# Patient Record
Sex: FEMALE | Race: WHITE | Employment: FULL TIME | URBAN - METROPOLITAN AREA
[De-identification: names, ages, dates, MRNs, and addresses within clinical notes are randomized per-mention and may not be internally consistent; named-entity substitution may affect disease eponyms.]

---

## 2017-01-14 ENCOUNTER — HOSPITAL ENCOUNTER (OUTPATIENT)
Dept: BONE DENSITY | Age: 52
Discharge: HOME | End: 2017-01-14
Attending: INTERNAL MEDICINE
Payer: COMMERCIAL

## 2017-01-14 DIAGNOSIS — M81.0 OSTEOPOROSIS: ICD-10-CM

## 2017-01-14 PROCEDURE — 77080 DXA BONE DENSITY AXIAL: CPT

## 2017-01-18 ENCOUNTER — TELEPHONE (OUTPATIENT)
Dept: ENDOCRINOLOGY CLINIC | Facility: CLINIC | Age: 52
End: 2017-01-18

## 2017-01-18 NOTE — TELEPHONE ENCOUNTER
Returned call to [place]. I did check OV from 12/9 and it appears we billed for Prolia and then automatically code is pulled for therapeutic/prophylactic injection. There is no difference from previous billing that I can tell.  Provided her phone number to

## 2017-01-18 NOTE — TELEPHONE ENCOUNTER
Pt was reviewing bill and noticed Akbar has been listed as \"chemotherapy\" - Pt requesting a different code to be used as she does not want chemotherapy listed on her medical records. Pls call. Thank you.

## 2017-05-12 ENCOUNTER — TELEPHONE (OUTPATIENT)
Dept: ENDOCRINOLOGY CLINIC | Facility: CLINIC | Age: 52
End: 2017-05-12

## 2017-05-12 ENCOUNTER — PATIENT MESSAGE (OUTPATIENT)
Dept: ENDOCRINOLOGY CLINIC | Facility: CLINIC | Age: 52
End: 2017-05-12

## 2017-05-12 DIAGNOSIS — M81.0 OSTEOPOROSIS, UNSPECIFIED OSTEOPOROSIS TYPE, UNSPECIFIED PATHOLOGICAL FRACTURE PRESENCE: Primary | ICD-10-CM

## 2017-05-12 DIAGNOSIS — R63.5 WEIGHT GAIN: ICD-10-CM

## 2017-05-12 NOTE — TELEPHONE ENCOUNTER
Dr. Jovan Javier see message below. Booking patient for follow up for Prolia and she is going to discuss possible weight loss medication at visit. Do you want any additional labwork besides prolia labs?

## 2017-05-12 NOTE — TELEPHONE ENCOUNTER
Patient responded to mychart. Will book appt. Informed her to complete labs. IV received and patient has coverage for injection with no PA required.

## 2017-05-12 NOTE — TELEPHONE ENCOUNTER
From: Pauline Hernandez RN  To: Christel Yanez  Sent: 5/12/2017 10:38 AM CDT  Subject: Artelia Render Morning Angelica Terry,   This message is a reminder that you are due for a follow up appointment and your next Prolia injection in June.  We can schedule y

## 2017-05-17 RX ORDER — HYDROCHLOROTHIAZIDE 25 MG/1
TABLET ORAL
Qty: 45 TABLET | Refills: 1 | Status: SHIPPED | OUTPATIENT
Start: 2017-05-17 | End: 2017-10-21

## 2017-05-18 ENCOUNTER — TELEPHONE (OUTPATIENT)
Dept: OBGYN CLINIC | Facility: CLINIC | Age: 52
End: 2017-05-18

## 2017-05-30 ENCOUNTER — APPOINTMENT (OUTPATIENT)
Dept: LAB | Facility: HOSPITAL | Age: 52
End: 2017-05-30
Attending: INTERNAL MEDICINE
Payer: COMMERCIAL

## 2017-05-30 DIAGNOSIS — R63.5 WEIGHT GAIN: ICD-10-CM

## 2017-05-30 DIAGNOSIS — M81.0 OSTEOPOROSIS, UNSPECIFIED OSTEOPOROSIS TYPE, UNSPECIFIED PATHOLOGICAL FRACTURE PRESENCE: ICD-10-CM

## 2017-05-30 PROCEDURE — 36415 COLL VENOUS BLD VENIPUNCTURE: CPT

## 2017-05-30 PROCEDURE — 80048 BASIC METABOLIC PNL TOTAL CA: CPT

## 2017-05-30 PROCEDURE — 84439 ASSAY OF FREE THYROXINE: CPT

## 2017-05-30 PROCEDURE — 84443 ASSAY THYROID STIM HORMONE: CPT

## 2017-05-30 PROCEDURE — 83970 ASSAY OF PARATHORMONE: CPT

## 2017-05-30 PROCEDURE — 84080 ASSAY ALKALINE PHOSPHATASES: CPT

## 2017-05-30 PROCEDURE — 82306 VITAMIN D 25 HYDROXY: CPT

## 2017-06-09 ENCOUNTER — OFFICE VISIT (OUTPATIENT)
Dept: ENDOCRINOLOGY CLINIC | Facility: CLINIC | Age: 52
End: 2017-06-09

## 2017-06-09 VITALS
HEIGHT: 63 IN | BODY MASS INDEX: 32.18 KG/M2 | HEART RATE: 102 BPM | DIASTOLIC BLOOD PRESSURE: 88 MMHG | SYSTOLIC BLOOD PRESSURE: 138 MMHG | WEIGHT: 181.63 LBS

## 2017-06-09 DIAGNOSIS — M81.0 OSTEOPOROSIS WITHOUT CURRENT PATHOLOGICAL FRACTURE, UNSPECIFIED OSTEOPOROSIS TYPE: Primary | ICD-10-CM

## 2017-06-09 PROCEDURE — 96372 THER/PROPH/DIAG INJ SC/IM: CPT | Performed by: INTERNAL MEDICINE

## 2017-06-09 PROCEDURE — 99214 OFFICE O/P EST MOD 30 MIN: CPT | Performed by: INTERNAL MEDICINE

## 2017-06-09 PROCEDURE — 99213 OFFICE O/P EST LOW 20 MIN: CPT | Performed by: INTERNAL MEDICINE

## 2017-06-09 RX ORDER — PHENTERMINE HYDROCHLORIDE 15 MG/1
15 CAPSULE ORAL EVERY MORNING
Qty: 30 CAPSULE | Refills: 2 | Status: SHIPPED | OUTPATIENT
Start: 2017-06-09 | End: 2017-10-20

## 2017-06-09 RX ORDER — ERGOCALCIFEROL (VITAMIN D2) 1250 MCG
50000 CAPSULE ORAL WEEKLY
Qty: 12 CAPSULE | Refills: 1 | Status: SHIPPED | OUTPATIENT
Start: 2017-06-09 | End: 2017-07-09

## 2017-06-09 RX ORDER — TOPIRAMATE 50 MG/1
50 TABLET, FILM COATED ORAL DAILY
Qty: 90 TABLET | Refills: 1 | Status: SHIPPED | OUTPATIENT
Start: 2017-06-09 | End: 2017-10-20

## 2017-06-09 NOTE — PROGRESS NOTES
Name: Kori Chester  Date: 6/9/2017    Referring Physician: No ref.  provider found    Patient presents with:  Osteoporosis      HISTORY OF PRESENT ILLNESS   Kori Chester is a 49year old female who presents for Patient presents with:  Yvette Diana prescriptions:   •  HYDROCHLOROTHIAZIDE 25 MG Oral Tab, Take one-half tablet by  mouth daily, Disp: 45 tablet, Rfl: 1  •  cholecalciferol (KP VITAMIN D) 1000 UNITS Oral Cap, Take 5,000 Units by mouth daily.   , Disp: , Rfl:      Allergies:   No Known Allerg appropriately suppressed  -Vitamin D level has decreased, start Ergocalciferol 50,000 units weekly  -Repeat Prolia injection today  -DEXA improved    2.  Obesity  -She has gained significant weight over the past year  -Start Phentermine 15mg PO daily, verba

## 2017-08-03 ENCOUNTER — PATIENT MESSAGE (OUTPATIENT)
Dept: INTERNAL MEDICINE CLINIC | Facility: CLINIC | Age: 52
End: 2017-08-03

## 2017-08-04 NOTE — TELEPHONE ENCOUNTER
From: Toni Braden  To: Stefan Garcia MD  Sent: 8/3/2017 9:03 AM CDT  Subject: Non-Urgent Medical Question    Shelly Rosa responded to my request to remove the colonoscopy reminder since I had one in 2012 (I wasn't able to reply directly to that [facility]

## 2017-09-01 ENCOUNTER — OFFICE VISIT (OUTPATIENT)
Dept: INTERNAL MEDICINE CLINIC | Facility: CLINIC | Age: 52
End: 2017-09-01

## 2017-09-01 VITALS
BODY MASS INDEX: 31.89 KG/M2 | HEART RATE: 80 BPM | SYSTOLIC BLOOD PRESSURE: 120 MMHG | HEIGHT: 63 IN | WEIGHT: 180 LBS | DIASTOLIC BLOOD PRESSURE: 84 MMHG

## 2017-09-01 DIAGNOSIS — K44.9 HIATAL HERNIA: ICD-10-CM

## 2017-09-01 DIAGNOSIS — M81.0 OSTEOPOROSIS WITHOUT CURRENT PATHOLOGICAL FRACTURE, UNSPECIFIED OSTEOPOROSIS TYPE: ICD-10-CM

## 2017-09-01 DIAGNOSIS — Z00.00 ROUTINE GENERAL MEDICAL EXAMINATION AT A HEALTH CARE FACILITY: Primary | ICD-10-CM

## 2017-09-01 DIAGNOSIS — Z12.31 VISIT FOR SCREENING MAMMOGRAM: ICD-10-CM

## 2017-09-01 DIAGNOSIS — I31.3 PERICARDIAL EFFUSION: ICD-10-CM

## 2017-09-01 PROBLEM — I31.39 PERICARDIAL EFFUSION: Status: ACTIVE | Noted: 2017-09-01

## 2017-09-01 PROBLEM — I31.39 PERICARDIAL EFFUSION (HCC): Status: ACTIVE | Noted: 2017-09-01

## 2017-09-01 PROCEDURE — 99396 PREV VISIT EST AGE 40-64: CPT | Performed by: INTERNAL MEDICINE

## 2017-09-01 RX ORDER — PANTOPRAZOLE SODIUM 40 MG/1
40 TABLET, DELAYED RELEASE ORAL
Qty: 30 TABLET | Refills: 2 | Status: SHIPPED | OUTPATIENT
Start: 2017-09-01 | End: 2017-10-20

## 2017-09-01 RX ORDER — ERGOCALCIFEROL 1.25 MG/1
CAPSULE ORAL
COMMUNITY
Start: 2017-06-09 | End: 2017-10-10

## 2017-09-01 NOTE — PROGRESS NOTES
HPI:    Patient ID: Ankita Lambert is a 49year old female.     PHYSICAL      Past Medical History:    Lumbar radiculopathy                                          Obesity, unspecified                                          Unspecified essential hyp • Other and unspecified hyperlipidemia    • Unspecified essential hypertension          Family History   Problem Relation Age of Onset   • Heart Attack Father 61     myocardial infarction   • Diabetes Father    • Hypertension Father    • Hypertension Mot well-nourished. HENT:   Right Ear: External ear normal.   Left Ear: External ear normal.   Nose: Nose normal.   Mouth/Throat: Oropharynx is clear and moist. No oropharyngeal exudate.    Eyes: Conjunctivae and EOM are normal. Pupils are equal, round, and r head and at the bed as discussed. Follow-up with gastroenterology–Dr. TOLENTINO–[phone] for an appointment. Osteoporosis     DEXA scan ordered per Dr. Whitney bolanos. She has been on Prolia.          Pericardial effusion     CT scan–calcium scoring luan

## 2017-09-01 NOTE — ASSESSMENT & PLAN NOTE
.Normal  exam.  Labs noted. Breast exam normal–no palpable abnormalities, discharge from the nipples or axillary lymphadenopathy. No cervical or inguinal lymphadenopathy. Hernial orifices are intact.   Skin check with multiple nevi–needs follow-up by paul

## 2017-09-01 NOTE — PATIENT INSTRUCTIONS
Problem List Items Addressed This Visit        Unprioritized    Hiatal hernia     Significantly large hiatal hernia with part of the stomach in the thorax. Patient has minimal symptoms at this time.   Advised to start on pantoprazole 40 mg 1 tablet once da

## 2017-09-01 NOTE — ASSESSMENT & PLAN NOTE
CT scan–calcium scoring showed incidental finding of pericardial effusion–mild, echocardiogram has been ordered.

## 2017-09-09 ENCOUNTER — HOSPITAL ENCOUNTER (OUTPATIENT)
Dept: CV DIAGNOSTICS | Facility: HOSPITAL | Age: 52
Discharge: HOME | End: 2017-09-09
Attending: INTERNAL MEDICINE
Payer: COMMERCIAL

## 2017-09-09 DIAGNOSIS — I31.3 PERICARDIAL EFFUSION: ICD-10-CM

## 2017-09-09 PROCEDURE — 93306 TTE W/DOPPLER COMPLETE: CPT | Performed by: INTERNAL MEDICINE

## 2017-09-30 ENCOUNTER — HOSPITAL ENCOUNTER (OUTPATIENT)
Dept: MAMMOGRAPHY | Facility: HOSPITAL | Age: 52
Discharge: HOME | End: 2017-09-30
Attending: INTERNAL MEDICINE
Payer: COMMERCIAL

## 2017-09-30 DIAGNOSIS — Z12.31 VISIT FOR SCREENING MAMMOGRAM: ICD-10-CM

## 2017-09-30 PROCEDURE — 77067 SCR MAMMO BI INCL CAD: CPT | Performed by: INTERNAL MEDICINE

## 2017-10-03 ENCOUNTER — PATIENT MESSAGE (OUTPATIENT)
Dept: INTERNAL MEDICINE CLINIC | Facility: CLINIC | Age: 52
End: 2017-10-03

## 2017-10-03 DIAGNOSIS — Z00.00 ROUTINE GENERAL MEDICAL EXAMINATION AT A HEALTH CARE FACILITY: Primary | ICD-10-CM

## 2017-10-04 NOTE — TELEPHONE ENCOUNTER
Removed all pharmacies other than [facility] and [facility], unable to cancel original labs.

## 2017-10-04 NOTE — TELEPHONE ENCOUNTER
From: Roby Simmonds  To: Gertrudis Hawk MD  Sent: 10/3/2017 11:06 AM CDT  Subject: Non-Urgent Medical Question    I have two requests:    1) I have an order from Dr. Marcus Ty for bloodwork through [address] [place] [address] (printed on 9/1 during my last visit).  I have met

## 2017-10-06 ENCOUNTER — HOSPITAL ENCOUNTER (OUTPATIENT)
Dept: ULTRASOUND IMAGING | Facility: HOSPITAL | Age: 52
Discharge: HOME | End: 2017-10-06
Attending: INTERNAL MEDICINE
Payer: COMMERCIAL

## 2017-10-06 ENCOUNTER — HOSPITAL ENCOUNTER (OUTPATIENT)
Dept: MAMMOGRAPHY | Facility: HOSPITAL | Age: 52
Discharge: HOME | End: 2017-10-06
Attending: INTERNAL MEDICINE
Payer: COMMERCIAL

## 2017-10-06 DIAGNOSIS — R92.8 ABNORMAL MAMMOGRAM: ICD-10-CM

## 2017-10-06 PROCEDURE — 77065 DX MAMMO INCL CAD UNI: CPT | Performed by: INTERNAL MEDICINE

## 2017-10-06 PROCEDURE — 76642 ULTRASOUND BREAST LIMITED: CPT | Performed by: INTERNAL MEDICINE

## 2017-10-07 ENCOUNTER — LAB ENCOUNTER (OUTPATIENT)
Dept: LAB | Facility: HOSPITAL | Age: 52
End: 2017-10-07
Attending: INTERNAL MEDICINE
Payer: COMMERCIAL

## 2017-10-07 DIAGNOSIS — Z00.00 ROUTINE GENERAL MEDICAL EXAMINATION AT A HEALTH CARE FACILITY: ICD-10-CM

## 2017-10-07 PROCEDURE — 81003 URINALYSIS AUTO W/O SCOPE: CPT

## 2017-10-07 PROCEDURE — 84443 ASSAY THYROID STIM HORMONE: CPT

## 2017-10-07 PROCEDURE — 80061 LIPID PANEL: CPT

## 2017-10-07 PROCEDURE — 82306 VITAMIN D 25 HYDROXY: CPT

## 2017-10-07 PROCEDURE — 80053 COMPREHEN METABOLIC PANEL: CPT

## 2017-10-07 PROCEDURE — 82607 VITAMIN B-12: CPT

## 2017-10-07 PROCEDURE — 85025 COMPLETE CBC W/AUTO DIFF WBC: CPT

## 2017-10-07 PROCEDURE — 36415 COLL VENOUS BLD VENIPUNCTURE: CPT

## 2017-10-13 ENCOUNTER — PATIENT MESSAGE (OUTPATIENT)
Dept: INTERNAL MEDICINE CLINIC | Facility: CLINIC | Age: 52
End: 2017-10-13

## 2017-10-13 NOTE — TELEPHONE ENCOUNTER
From: Landrum Merlin  To: Iliana Arellano MD  Sent: 10/13/2017 8:43 AM CDT  Subject: Non-Urgent Medical Question    Hi Dr Carolyne Kimble be scheduling a follow up appointment for my cholesterol soon.  Uriel Crisostomo been taking 50,000 units of vitamin D since Ksenia Lawrence

## 2017-10-19 NOTE — TELEPHONE ENCOUNTER
See mychart message response      Justin Monroe been taking the 50,000 units for four months prescribed by Dr. Sarwat Peralta and my level is still low. I was taking 5,000 per day before that. I will continue and discuss with both of you in my follow up appointments.

## 2017-10-20 ENCOUNTER — PATIENT MESSAGE (OUTPATIENT)
Dept: GASTROENTEROLOGY | Facility: CLINIC | Age: 52
End: 2017-10-20

## 2017-10-20 ENCOUNTER — OFFICE VISIT (OUTPATIENT)
Dept: GASTROENTEROLOGY | Facility: CLINIC | Age: 52
End: 2017-10-20

## 2017-10-20 ENCOUNTER — TELEPHONE (OUTPATIENT)
Dept: GASTROENTEROLOGY | Facility: CLINIC | Age: 52
End: 2017-10-20

## 2017-10-20 ENCOUNTER — OFFICE VISIT (OUTPATIENT)
Dept: OBGYN CLINIC | Facility: CLINIC | Age: 52
End: 2017-10-20

## 2017-10-20 VITALS
SYSTOLIC BLOOD PRESSURE: 125 MMHG | WEIGHT: 194 LBS | HEIGHT: 63 IN | BODY MASS INDEX: 34.37 KG/M2 | DIASTOLIC BLOOD PRESSURE: 89 MMHG | HEART RATE: 102 BPM

## 2017-10-20 VITALS
DIASTOLIC BLOOD PRESSURE: 96 MMHG | HEART RATE: 147 BPM | WEIGHT: 194.38 LBS | SYSTOLIC BLOOD PRESSURE: 138 MMHG | BODY MASS INDEX: 34.44 KG/M2 | HEIGHT: 63 IN

## 2017-10-20 DIAGNOSIS — Z01.419 ENCOUNTER FOR GYNECOLOGICAL EXAMINATION: ICD-10-CM

## 2017-10-20 DIAGNOSIS — Z12.4 SCREENING FOR MALIGNANT NEOPLASM OF CERVIX: Primary | ICD-10-CM

## 2017-10-20 DIAGNOSIS — K44.9 HIATAL HERNIA: ICD-10-CM

## 2017-10-20 DIAGNOSIS — R93.3 ABNORMAL CT SCAN, STOMACH: ICD-10-CM

## 2017-10-20 DIAGNOSIS — R10.13 EPIGASTRIC ABDOMINAL PAIN: Primary | ICD-10-CM

## 2017-10-20 PROCEDURE — 99212 OFFICE O/P EST SF 10 MIN: CPT | Performed by: INTERNAL MEDICINE

## 2017-10-20 PROCEDURE — 99214 OFFICE O/P EST MOD 30 MIN: CPT | Performed by: INTERNAL MEDICINE

## 2017-10-20 PROCEDURE — 99396 PREV VISIT EST AGE 40-64: CPT | Performed by: OBSTETRICS & GYNECOLOGY

## 2017-10-20 NOTE — PROGRESS NOTES
HPI:    Patient ID: Ziggy Briggs is a 49year old woman with history of elevated BMI, hypertension, dyslipidemia, cholecystectomy in 2010.   She returns today for follow-up of abdominal symptoms and question of progression, worsening of previous hiat surgery in March of 2010 here at [address] [place] [address], apparently for gallstones by the operative report and pathology. Intraoperative cholangiogram was performed and was negative.  She does not recall similar such pain at that time.     Previous history is also significant months of episodic right upper quadrant pain once or twice per week with the only noted triggers being ibuprofen and a single dose of hydrocodone.  Possibilities include functional, idiopathic which would be a bit atypical with the episodic nature these com by Dr. Gucci Aguilar for assessment of approximately 4 week history of diarrhea which has turned bloody.     She first suffered several days of diarrhea the first week of April, possibly around April 3rd.  This resolved spontaneously and she was well for at least restaurant for a meal which included oysters and a tuna tartare. Of 4 people sharing that meal, one other person was hospitalized with acute gastrointestinal syndrome. She vomited that night after having that meal and some alcohol.  No more recent exposures MAC anesthesia. We discussed the nature and risks of EGD examination including sedation, anesthesia risks; bleeding, esophagus/stomach/duodenal injury or perforation, infection. The patient understood these risks and agrees to proceed.   The need for an acc

## 2017-10-20 NOTE — TELEPHONE ENCOUNTER
See above message from pt. Did you want pt to continue pantorprazole?   If so, pended above to send to [facility]

## 2017-10-20 NOTE — TELEPHONE ENCOUNTER
From: Hallie Yanez  To: Kristian Segura MD  Sent: 10/20/2017 5:24 PM CDT  Subject: Prescription Question    I forgot to ask if I should continue taking the Pantoprazole that Dr Yamilka Sanchez prescribed.  If I do I will need it called in through [facility]

## 2017-10-20 NOTE — TELEPHONE ENCOUNTER
Scheduled for:  EGD    Provider Name:  NEGIN  Date:  11-30-17  Location:  [facility]  Sedation:  MAC  Time:  Pt aware CF will call with arrival time the day prior to procedure.    Prep: EGD prep  Meds/Allergies Reconciled?:  Physician reviewed   Diagnosis with code

## 2017-10-20 NOTE — PATIENT INSTRUCTIONS
1. MRI scan    2.  Schedule EGD (stomach endoscopy) exam at [facility])    MAC anesthesia    DX = abnormal stomach on CT scan, epigastic pain and vomiting

## 2017-10-22 RX ORDER — PANTOPRAZOLE SODIUM 40 MG/1
40 TABLET, DELAYED RELEASE ORAL
Qty: 90 TABLET | Refills: 3 | Status: SHIPPED | OUTPATIENT
Start: 2017-10-22 | End: 2018-04-07

## 2017-10-23 RX ORDER — HYDROCHLOROTHIAZIDE 25 MG/1
TABLET ORAL
Qty: 45 TABLET | Refills: 2 | Status: SHIPPED | OUTPATIENT
Start: 2017-10-23 | End: 2018-04-07

## 2017-10-23 NOTE — PROGRESS NOTES
Ankita Lambert is a 49year old female [id number] Patient's last menstrual period was 04/01/2014. here for annual exam.       Last seen 10/14/16 for colpo-- REDD 1. Had LEEP done in 2004 with me.   Had increased bleeding during LEEP     LMP 2014    No c ALLERGIES:  No Known Allergies      Review of Systems:  Constitutional:  Denies fatigue, night sweats, hot flashes  Eyes:  denies blurred or double vision  Cardiovascular:  denies chest pain or palpitations  Respiratory:  denies shortness of breath an annual physical exam.    1. Encounter for gynecological examination  Pap and HPV. Annual exams encouraged. RTC 1 year or prn    2.  REDD 1 in 2016        No prescriptions requested or ordered in this encounter      Truong Eid MD  10/22/2017  10:31 PM

## 2017-10-25 ENCOUNTER — TELEPHONE (OUTPATIENT)
Dept: GASTROENTEROLOGY | Facility: CLINIC | Age: 52
End: 2017-10-25

## 2017-10-25 NOTE — TELEPHONE ENCOUNTER
Forward to [facility] for the Tax ID number at [facility].

## 2017-10-25 NOTE — TELEPHONE ENCOUNTER
[facility] indicates she will cancel orders, only show cln not egd, requesting to resubmit, no need to call, thanks.

## 2017-10-25 NOTE — TELEPHONE ENCOUNTER
[facility] calling for pt regarding procedure 11/30, requesting how it will be billed ie in office, etc, pls call at:[phone],thanks.  (Due on 10/27, submitted incorrect)  Pls call asap

## 2017-10-25 NOTE — TELEPHONE ENCOUNTER
# left was direct # for [facility]    I called and spoke with her, reviewed pt is scheduled at an ambulatory surgery center, NOT hospital outpatient, so no precertification is required at this time.

## 2017-10-25 NOTE — TELEPHONE ENCOUNTER
From: Go Carpenter CMA  To: Mihai Yanez  Sent: 10/20/2017 4:17 PM CDT  Subject: EGD instrcutions     Juani Hernández,     As discussed at your office visit, you need to contact your insurance company prior to your procedure to check for pre-certificatio

## 2017-10-29 ENCOUNTER — PATIENT MESSAGE (OUTPATIENT)
Dept: GASTROENTEROLOGY | Facility: CLINIC | Age: 52
End: 2017-10-29

## 2017-10-30 NOTE — TELEPHONE ENCOUNTER
From: Feliberto Yanez  To: Norah Jon MD  Sent: 10/29/2017 11:02 AM CDT  Subject: Non-Urgent Medical Question    I was pre authorized for the MRI.  I received a letter in the mail from [facility] on behalf of [facility] asking for prior tests, lab

## 2017-11-06 ENCOUNTER — HOSPITAL ENCOUNTER (OUTPATIENT)
Dept: MRI IMAGING | Facility: HOSPITAL | Age: 52
Discharge: HOME | End: 2017-11-06
Attending: INTERNAL MEDICINE
Payer: COMMERCIAL

## 2017-11-06 DIAGNOSIS — R10.13 EPIGASTRIC ABDOMINAL PAIN: ICD-10-CM

## 2017-11-06 PROCEDURE — A9575 INJ GADOTERATE MEGLUMI 0.1ML: HCPCS | Performed by: INTERNAL MEDICINE

## 2017-11-06 PROCEDURE — 74183 MRI ABD W/O CNTR FLWD CNTR: CPT | Performed by: INTERNAL MEDICINE

## 2017-11-13 ENCOUNTER — TELEPHONE (OUTPATIENT)
Dept: ENDOCRINOLOGY CLINIC | Facility: CLINIC | Age: 52
End: 2017-11-13

## 2017-11-13 NOTE — TELEPHONE ENCOUNTER
Daryn Odonnell due for next Prolia injection in December. Currently has appt scheduled for 12/8. IV submitted to Prolia plus and will follow up on coverage. Labs completed within the last year per [facility] protocol.

## 2017-11-14 ENCOUNTER — PATIENT MESSAGE (OUTPATIENT)
Dept: GASTROENTEROLOGY | Facility: CLINIC | Age: 52
End: 2017-11-14

## 2017-11-15 ENCOUNTER — PATIENT MESSAGE (OUTPATIENT)
Dept: ENDOCRINOLOGY CLINIC | Facility: CLINIC | Age: 52
End: 2017-11-15

## 2017-11-15 DIAGNOSIS — M81.0 OSTEOPOROSIS, UNSPECIFIED OSTEOPOROSIS TYPE, UNSPECIFIED PATHOLOGICAL FRACTURE PRESENCE: Primary | ICD-10-CM

## 2017-11-15 NOTE — TELEPHONE ENCOUNTER
From: Darvin Yanez  To: Jasper Gomes MD  Sent: 11/14/2017 8:45 AM CST  Subject: Test Results Question    I have a couple of questions.      1) I read the results of my MRI but need to know what it really means and if there’s anything xiomara

## 2017-11-15 NOTE — TELEPHONE ENCOUNTER
From: Arleen Yanez  To: Rand Saunders MD  Sent: 11/15/2017 8:56 AM CST  Subject: Non-Urgent Medical Question    I have an appointment on 12/8 to get my Prolia injection. I’ll need my lab order.  Also, I had some blood work done in October ordered by

## 2017-11-15 NOTE — TELEPHONE ENCOUNTER
CMP, vitamin D completed in 10/2017. Orders placed for PTH and bone specific alkaline phosphatase per [facility] protocol.

## 2017-11-16 NOTE — TELEPHONE ENCOUNTER
IV received from Prolia plus. Patient has coverage for injection and no PA required.  Should owe 0% of cost.

## 2017-11-30 ENCOUNTER — TELEPHONE (OUTPATIENT)
Dept: GASTROENTEROLOGY | Facility: CLINIC | Age: 52
End: 2017-11-30

## 2017-11-30 ENCOUNTER — LAB REQUISITION (OUTPATIENT)
Dept: LAB | Facility: HOSPITAL | Age: 52
End: 2017-11-30
Payer: COMMERCIAL

## 2017-11-30 DIAGNOSIS — R10.13 EPIGASTRIC ABDOMINAL PAIN: ICD-10-CM

## 2017-11-30 DIAGNOSIS — K57.10 DUODENAL DIVERTICULUM: ICD-10-CM

## 2017-11-30 DIAGNOSIS — K44.9 HIATAL HERNIA: Primary | ICD-10-CM

## 2017-11-30 DIAGNOSIS — Z01.89 ENCOUNTER FOR OTHER SPECIFIED SPECIAL EXAMINATIONS: ICD-10-CM

## 2017-11-30 PROCEDURE — 88312 SPECIAL STAINS GROUP 1: CPT | Performed by: INTERNAL MEDICINE

## 2017-11-30 PROCEDURE — 88305 TISSUE EXAM BY PATHOLOGIST: CPT | Performed by: INTERNAL MEDICINE

## 2017-11-30 NOTE — TELEPHONE ENCOUNTER
EGD completed today. UGI XR ordered today to further evaluate HH. LFTs/lipase ordered for next episode severe pain. Colicky pain episodes include vomiting, sensation of being unable to breathe, unable to swallow.

## 2017-11-30 NOTE — TELEPHONE ENCOUNTER
Dr Simone Kumar - see below. I do not see this message was addressed. Please advise on MRI results from 11/6. Pt has sent multiple "[employer] messages.

## 2017-12-02 ENCOUNTER — APPOINTMENT (OUTPATIENT)
Dept: LAB | Facility: HOSPITAL | Age: 52
End: 2017-12-02
Attending: INTERNAL MEDICINE
Payer: COMMERCIAL

## 2017-12-02 DIAGNOSIS — M81.0 OSTEOPOROSIS, UNSPECIFIED OSTEOPOROSIS TYPE, UNSPECIFIED PATHOLOGICAL FRACTURE PRESENCE: ICD-10-CM

## 2017-12-02 PROCEDURE — 83970 ASSAY OF PARATHORMONE: CPT

## 2017-12-02 PROCEDURE — 36415 COLL VENOUS BLD VENIPUNCTURE: CPT

## 2017-12-02 PROCEDURE — 84080 ASSAY ALKALINE PHOSPHATASES: CPT

## 2017-12-08 ENCOUNTER — OFFICE VISIT (OUTPATIENT)
Dept: INTERNAL MEDICINE CLINIC | Facility: CLINIC | Age: 52
End: 2017-12-08

## 2017-12-08 ENCOUNTER — OFFICE VISIT (OUTPATIENT)
Dept: ENDOCRINOLOGY CLINIC | Facility: CLINIC | Age: 52
End: 2017-12-08

## 2017-12-08 VITALS
SYSTOLIC BLOOD PRESSURE: 136 MMHG | HEART RATE: 123 BPM | DIASTOLIC BLOOD PRESSURE: 95 MMHG | HEIGHT: 63 IN | BODY MASS INDEX: 36.54 KG/M2 | WEIGHT: 206.19 LBS

## 2017-12-08 VITALS
DIASTOLIC BLOOD PRESSURE: 84 MMHG | HEART RATE: 105 BPM | RESPIRATION RATE: 16 BRPM | BODY MASS INDEX: 36.5 KG/M2 | WEIGHT: 206 LBS | SYSTOLIC BLOOD PRESSURE: 136 MMHG | HEIGHT: 63 IN

## 2017-12-08 DIAGNOSIS — M81.0 OSTEOPOROSIS WITHOUT CURRENT PATHOLOGICAL FRACTURE, UNSPECIFIED OSTEOPOROSIS TYPE: ICD-10-CM

## 2017-12-08 DIAGNOSIS — K44.9 HIATAL HERNIA: Primary | ICD-10-CM

## 2017-12-08 DIAGNOSIS — E78.49 OTHER HYPERLIPIDEMIA: ICD-10-CM

## 2017-12-08 DIAGNOSIS — I10 ESSENTIAL HYPERTENSION: ICD-10-CM

## 2017-12-08 DIAGNOSIS — R92.8 ABNORMALITY OF RIGHT BREAST ON SCREENING MAMMOGRAM: ICD-10-CM

## 2017-12-08 DIAGNOSIS — K76.89 FOCAL NODULAR HYPERPLASIA OF LIVER: ICD-10-CM

## 2017-12-08 DIAGNOSIS — M81.0 OSTEOPOROSIS WITHOUT CURRENT PATHOLOGICAL FRACTURE, UNSPECIFIED OSTEOPOROSIS TYPE: Primary | ICD-10-CM

## 2017-12-08 PROBLEM — I31.3 PERICARDIAL EFFUSION: Status: RESOLVED | Noted: 2017-09-01 | Resolved: 2017-12-08

## 2017-12-08 PROBLEM — I31.39 PERICARDIAL EFFUSION: Status: RESOLVED | Noted: 2017-09-01 | Resolved: 2017-12-08

## 2017-12-08 PROBLEM — I31.39 PERICARDIAL EFFUSION (HCC): Status: RESOLVED | Noted: 2017-09-01 | Resolved: 2017-12-08

## 2017-12-08 PROCEDURE — 99212 OFFICE O/P EST SF 10 MIN: CPT | Performed by: INTERNAL MEDICINE

## 2017-12-08 PROCEDURE — 96372 THER/PROPH/DIAG INJ SC/IM: CPT | Performed by: INTERNAL MEDICINE

## 2017-12-08 PROCEDURE — 99213 OFFICE O/P EST LOW 20 MIN: CPT | Performed by: INTERNAL MEDICINE

## 2017-12-08 PROCEDURE — 99214 OFFICE O/P EST MOD 30 MIN: CPT | Performed by: INTERNAL MEDICINE

## 2017-12-08 RX ORDER — ERGOCALCIFEROL 1.25 MG/1
CAPSULE ORAL
Qty: 24 CAPSULE | Refills: 1 | Status: SHIPPED | OUTPATIENT
Start: 2017-12-08 | End: 2018-04-07

## 2017-12-08 RX ORDER — ROSUVASTATIN CALCIUM 5 MG/1
5 TABLET, COATED ORAL NIGHTLY
Qty: 90 TABLET | Refills: 1 | Status: SHIPPED | OUTPATIENT
Start: 2017-12-08 | End: 2018-04-12

## 2017-12-08 NOTE — ASSESSMENT & PLAN NOTE
Extremely large hiatal hernia with the part of the stomach in the thoracic cavity. Given persistent palpitations advised a surgical consult. She will be continued on pantoprazole at this time EGD without any significant ulcerations.   There is high risk f

## 2017-12-08 NOTE — ASSESSMENT & PLAN NOTE
Would recommend a follow-up MRI in the next 6-8 months. Asymptomatic at this time, will follow up on liver function tests.

## 2017-12-08 NOTE — PROGRESS NOTES
Name: Lena Hollins  Date: 12/8/2017    Referring Physician: No ref.  provider found    Patient presents with:  Osteoporosis      HISTORY OF PRESENT ILLNESS   Lena Hollins is a 49year old female who presents for Patient presents with:  Fermin Ragsdale Pantoprazole Sodium 40 MG Oral Tab EC, Take 1 tablet (40 mg total) by mouth every morning before breakfast., Disp: 90 tablet, Rfl: 3  •  ergocalciferol 78550 units Oral Cap, 1 capsule p.o. q. weekly, Disp: 12 capsule, Rfl: 0  •  SUMAtriptan Succinate (IMIT grossly intact and motor grossly intact  Psychiatric:  oriented to time, self, and place  Nutritional:  no abnormal weight gain or loss    ASSESSMENT/PLAN:    1.  Osteoporosis  -Diagnosed with significant osteoporosis  -Started on prolia and tolerating well

## 2017-12-08 NOTE — ASSESSMENT & PLAN NOTE
Blood pressure 136/84, pulse 105, resp. rate 16, height 5' 3\" (1.6 m), weight 206 lb (93.4 kg), last menstrual period 04/01/2014, not currently breastfeeding.     blood pressure was quite elevated upon arrival but seemed to improve but heart rate remained

## 2017-12-08 NOTE — PATIENT INSTRUCTIONS
Problem List Items Addressed This Visit        Unprioritized    Abnormality of right breast on screening mammogram     Follow-up diagnostic mammogram right with ultrasound has been ordered         Relevant Orders    MYESHA DIAGNOSTIC RIGHT (CPT=77065)    Esse LIPID PANEL

## 2017-12-08 NOTE — PROGRESS NOTES
HPI:    Patient ID: Ashlee Donohue is a 47year old female. Mri abdomen per dr Ekta Sen:   1. There is indeterminate arterially enhancing lesion at the border of segments V and VI. This is not seen on the remaining sequences.  The foremo GERD.       Review of Systems   Constitutional: Negative. HENT: Negative. Eyes: Negative. Respiratory: Negative. Cardiovascular: Positive for palpitations. Gastrointestinal: Positive for abdominal pain. Endocrine: Negative.     Genitourinary no tenderness. Abdominal: Soft. Bowel sounds are normal. She exhibits no distension and no mass. There is no tenderness. There is no rebound. Musculoskeletal: Normal range of motion. She exhibits no tenderness.    Lymphadenopathy:     She has no cervica WITH DIFFERENTIAL WITH PLATELET    Osteoporosis    Other hyperlipidemia     Lipid panel and liver function tests have been discussed. Patient willing to start on Crestor at this time at 5 mg daily. Prescription has been sent into the pharmacy.   Recheck l

## 2017-12-08 NOTE — ASSESSMENT & PLAN NOTE
Lipid panel and liver function tests have been discussed. Patient willing to start on Crestor at this time at 5 mg daily. Prescription has been sent into the pharmacy. Recheck labs in about 3 months.   Call after insurance changes for appropriate lab ord

## 2017-12-11 ENCOUNTER — PATIENT MESSAGE (OUTPATIENT)
Dept: INTERNAL MEDICINE CLINIC | Facility: CLINIC | Age: 52
End: 2017-12-11

## 2017-12-11 RX ORDER — SUMATRIPTAN 50 MG/1
TABLET, FILM COATED ORAL
Qty: 9 TABLET | Refills: 3 | Status: SHIPPED | OUTPATIENT
Start: 2017-12-11 | End: 2018-11-30

## 2017-12-11 NOTE — TELEPHONE ENCOUNTER
Please advise on my chart message     From: Cristofer Demarco  To: Ashley Ghotra MD  Sent: 12/11/2017  3:18 PM CST  Subject: Non-Urgent Medical Question    Hi Dr Brunilda Greenwood,    I need three things:    1) Recommended surgeon for hiatal hernia consult.  You me

## 2017-12-13 ENCOUNTER — TELEPHONE (OUTPATIENT)
Dept: GASTROENTEROLOGY | Facility: CLINIC | Age: 52
End: 2017-12-13

## 2017-12-13 NOTE — TELEPHONE ENCOUNTER
I mailed out EGD results letter to pt  GI RNs - 1.  Please print and mail this letter to patient     em

## 2017-12-22 ENCOUNTER — HOSPITAL ENCOUNTER (EMERGENCY)
Facility: HOSPITAL | Age: 52
Discharge: HOME | End: 2017-12-22
Attending: EMERGENCY MEDICINE
Payer: COMMERCIAL

## 2017-12-22 VITALS
WEIGHT: 205 LBS | BODY MASS INDEX: 36.32 KG/M2 | HEIGHT: 63 IN | TEMPERATURE: 100 F | SYSTOLIC BLOOD PRESSURE: 124 MMHG | OXYGEN SATURATION: 99 % | DIASTOLIC BLOOD PRESSURE: 89 MMHG | RESPIRATION RATE: 18 BRPM | HEART RATE: 104 BPM

## 2017-12-22 DIAGNOSIS — R10.9 ABDOMINAL PAIN, ACUTE: Primary | ICD-10-CM

## 2017-12-22 DIAGNOSIS — R19.7 DIARRHEA, UNSPECIFIED TYPE: ICD-10-CM

## 2017-12-22 PROCEDURE — 99284 EMERGENCY DEPT VISIT MOD MDM: CPT

## 2017-12-22 PROCEDURE — 80053 COMPREHEN METABOLIC PANEL: CPT | Performed by: EMERGENCY MEDICINE

## 2017-12-22 PROCEDURE — 81003 URINALYSIS AUTO W/O SCOPE: CPT | Performed by: EMERGENCY MEDICINE

## 2017-12-22 PROCEDURE — 85027 COMPLETE CBC AUTOMATED: CPT | Performed by: EMERGENCY MEDICINE

## 2017-12-22 PROCEDURE — 85025 COMPLETE CBC W/AUTO DIFF WBC: CPT | Performed by: EMERGENCY MEDICINE

## 2017-12-22 PROCEDURE — 85007 BL SMEAR W/DIFF WBC COUNT: CPT | Performed by: EMERGENCY MEDICINE

## 2017-12-22 PROCEDURE — 96360 HYDRATION IV INFUSION INIT: CPT

## 2017-12-22 PROCEDURE — 83690 ASSAY OF LIPASE: CPT | Performed by: EMERGENCY MEDICINE

## 2017-12-22 RX ORDER — DICYCLOMINE HYDROCHLORIDE 10 MG/5ML
20 SOLUTION ORAL ONCE
Status: COMPLETED | OUTPATIENT
Start: 2017-12-22 | End: 2017-12-22

## 2017-12-22 RX ORDER — DICYCLOMINE HYDROCHLORIDE 10 MG/ML
20 INJECTION INTRAMUSCULAR ONCE
Status: DISCONTINUED | OUTPATIENT
Start: 2017-12-22 | End: 2017-12-22

## 2017-12-22 RX ORDER — MAGNESIUM HYDROXIDE/ALUMINUM HYDROXICE/SIMETHICONE 120; 1200; 1200 MG/30ML; MG/30ML; MG/30ML
30 SUSPENSION ORAL ONCE
Status: COMPLETED | OUTPATIENT
Start: 2017-12-22 | End: 2017-12-22

## 2017-12-22 NOTE — ED NOTES
Patient presents to ed with days of diarrhea and crampy abdominal pain. Patient has been traveling a lot. Alert oriented, well appearing. Denies vomiting or fever.

## 2017-12-22 NOTE — ED PROVIDER NOTES
Patient Seen in: [facility] AND [facility] Emergency Department    History   Patient presents with:  Abdomen/Flank Pain (GI/)    Stated Complaint: Abdominal Pain    HPI  48 yo female with abdominal pain. She c/o right sided abdominal pain.  She has had diarrhea Cardiovascular: Normal rate, regular rhythm, normal heart sounds and intact distal pulses. Pulmonary/Chest: Effort normal and breath sounds normal.   Abdominal: Soft. Bowel sounds are normal. She exhibits no distension and no mass.  There is tenderness RAINBOW DRAW LAVENDER TALL (BNP)       ED Course as of Dec 22 0623  ------------------------------------------------------------       MDM   Labs and imaging reviewed and are unremarkable. Patient reexamined at  and is feeling better. Discharge home.

## 2017-12-27 ENCOUNTER — HOSPITAL ENCOUNTER (OUTPATIENT)
Dept: GENERAL RADIOLOGY | Facility: HOSPITAL | Age: 52
Discharge: HOME | End: 2017-12-27
Attending: INTERNAL MEDICINE
Payer: COMMERCIAL

## 2017-12-27 DIAGNOSIS — R10.13 EPIGASTRIC ABDOMINAL PAIN: ICD-10-CM

## 2017-12-27 DIAGNOSIS — K44.9 HIATAL HERNIA: ICD-10-CM

## 2017-12-27 PROCEDURE — 74246 X-RAY XM UPR GI TRC 2CNTRST: CPT | Performed by: INTERNAL MEDICINE

## 2018-01-05 ENCOUNTER — TELEPHONE (OUTPATIENT)
Dept: GASTROENTEROLOGY | Facility: CLINIC | Age: 53
End: 2018-01-05

## 2018-01-05 NOTE — TELEPHONE ENCOUNTER
Pt calling for xray from 12/27 - pls call or post on [employer], pt requesting to know exactly what it means, ph:[phone],thanks.  *ok leave detailed message on vm

## 2018-01-05 NOTE — TELEPHONE ENCOUNTER
Dr YATES [facility],    Pt requesting XR UGI w/Esophagram results.     Please advise

## 2018-01-08 NOTE — TELEPHONE ENCOUNTER
My Chart message sent to Ms Sydnee Servin today: \"Dear Ms. Yanez,    Thank you for coming in for the upper GI x-ray on 12/27/2017. I apologize for the delay in returning these results to you. That upper GI x-ray exam looked okay.   I ordered that ex

## 2018-01-09 NOTE — TELEPHONE ENCOUNTER
I spoke to Renato Andrews over the phone. Appt scheduled.     Future Appointments  Date Time Provider Thien Cox   4/27/2018 9:00 AM Noah Barney MD [facility]

## 2018-02-15 ENCOUNTER — HOSPITAL ENCOUNTER (OUTPATIENT)
Age: 53
Discharge: HOME | End: 2018-02-15
Attending: EMERGENCY MEDICINE
Payer: COMMERCIAL

## 2018-02-15 VITALS
HEIGHT: 63 IN | BODY MASS INDEX: 37.03 KG/M2 | TEMPERATURE: 98 F | DIASTOLIC BLOOD PRESSURE: 101 MMHG | WEIGHT: 209 LBS | SYSTOLIC BLOOD PRESSURE: 150 MMHG | OXYGEN SATURATION: 96 % | HEART RATE: 113 BPM | RESPIRATION RATE: 18 BRPM

## 2018-02-15 DIAGNOSIS — J11.1 INFLUENZA: Primary | ICD-10-CM

## 2018-02-15 PROCEDURE — 99214 OFFICE O/P EST MOD 30 MIN: CPT

## 2018-02-15 PROCEDURE — 99213 OFFICE O/P EST LOW 20 MIN: CPT

## 2018-02-15 RX ORDER — BENZONATATE 200 MG/1
200 CAPSULE ORAL 3 TIMES DAILY PRN
Qty: 15 CAPSULE | Refills: 0 | Status: SHIPPED | OUTPATIENT
Start: 2018-02-15 | End: 2018-02-20

## 2018-02-15 RX ORDER — OSELTAMIVIR PHOSPHATE 75 MG/1
75 CAPSULE ORAL 2 TIMES DAILY
Qty: 10 CAPSULE | Refills: 0 | Status: SHIPPED | OUTPATIENT
Start: 2018-02-15 | End: 2018-02-20

## 2018-02-15 NOTE — ED PROVIDER NOTES
Patient Seen in: [facility]    History   Patient presents with:  Cough/URI    Stated Complaint: Cough    HPI  Patient complains of body aches, generalized malaise and cough for a day and a half and last night started with person, place, and time. She appears well-developed and well-nourished. No distress. Well appearing   HENT:   Head: Normocephalic and atraumatic.    Right Ear: Tympanic membrane and external ear normal.   Left Ear: Tympanic membrane and external ear amol medications    Oseltamivir Phosphate (TAMIFLU) 75 MG Oral Cap  Take 1 capsule (75 mg total) by mouth 2 (two) times daily. , Print, Disp-10 capsule, R-0

## 2018-03-22 ENCOUNTER — PATIENT MESSAGE (OUTPATIENT)
Dept: ENDOCRINOLOGY CLINIC | Facility: CLINIC | Age: 53
End: 2018-03-22

## 2018-03-22 NOTE — TELEPHONE ENCOUNTER
From: Feliberto Yanez  To: Sav Roach MD  Sent: 3/22/2018 11:35 AM CDT  Subject: Non-Urgent Medical Question    I received a letter that I'm overdue for a test. I'm not sure what test is due. My next Prolia injection is scheduled for June.      Also,

## 2018-03-22 NOTE — TELEPHONE ENCOUNTER
Mailed lab orders to patient to have at [facility] or [address] [place] [address] per insurance in May 2018 before next injection in June. Last Prolia injection 12/8/2017 - Written in prolia book for 6/8/2018.

## 2018-03-22 NOTE — TELEPHONE ENCOUNTER
From: Tasha Yanez  To: Bradley Schaumann, MD  Sent: 3/22/2018 12:27 PM CDT  Subject: Non-Urgent Medical Question    Sorry to bother you again. One additional request.     I need to update my pharmacy and am not seeing a way to do that within [address]  [place]

## 2018-04-07 RX ORDER — ROSUVASTATIN CALCIUM 5 MG/1
5 TABLET, COATED ORAL NIGHTLY
Qty: 90 TABLET | Refills: 1 | Status: CANCELLED
Start: 2018-04-07

## 2018-04-09 ENCOUNTER — PATIENT MESSAGE (OUTPATIENT)
Dept: ENDOCRINOLOGY CLINIC | Facility: CLINIC | Age: 53
End: 2018-04-09

## 2018-04-09 RX ORDER — ERGOCALCIFEROL 1.25 MG/1
CAPSULE ORAL
Qty: 24 CAPSULE | Refills: 0 | Status: SHIPPED | OUTPATIENT
Start: 2018-04-09 | End: 2018-07-03

## 2018-04-09 RX ORDER — HYDROCHLOROTHIAZIDE 25 MG/1
12.5 TABLET ORAL DAILY
Qty: 45 TABLET | Refills: 5 | Status: SHIPPED
Start: 2018-04-09 | End: 2018-04-09

## 2018-04-09 RX ORDER — HYDROCHLOROTHIAZIDE 25 MG/1
12.5 TABLET ORAL DAILY
Qty: 90 TABLET | Refills: 1 | Status: SHIPPED | OUTPATIENT
Start: 2018-04-09 | End: 2019-02-01

## 2018-04-09 RX ORDER — ERGOCALCIFEROL 1.25 MG/1
CAPSULE ORAL
Qty: 24 CAPSULE | Refills: 0 | Status: SHIPPED
Start: 2018-04-09 | End: 2018-04-09

## 2018-04-09 NOTE — TELEPHONE ENCOUNTER
From: Ashlee Donohue  Sent: 4/7/2018 9:20 PM CDT  Subject: Medication Renewal Request    Destinlynette Kar.  Beau would like a refill of the following medications:     HYDROCHLOROTHIAZIDE 25 MG Oral Tab [Charity King MD]     ergocalciferol 90483 units Ora

## 2018-04-09 NOTE — TELEPHONE ENCOUNTER
From: Darvin Yanez  To: Angelica De Jesus MD  Sent: 4/9/2018 3:01 PM CDT  Subject: Prescription Question    I requested refills of my medication and I appreciate the prompt response.  However, it was sent to my old mail order pharmacy, [facility] even thoug

## 2018-04-10 ENCOUNTER — PATIENT MESSAGE (OUTPATIENT)
Dept: GASTROENTEROLOGY | Facility: CLINIC | Age: 53
End: 2018-04-10

## 2018-04-10 RX ORDER — PANTOPRAZOLE SODIUM 40 MG/1
40 TABLET, DELAYED RELEASE ORAL
Qty: 90 TABLET | Refills: 3 | Status: SHIPPED
Start: 2018-04-10 | End: 2018-04-12

## 2018-04-10 NOTE — TELEPHONE ENCOUNTER
Pending Prescriptions Disp Refills    Pantoprazole Sodium 40 MG Oral Tab EC 90 tablet 3     Sig: Take 1 tablet (40 mg total) by mouth every morning before breakfast.       Last seen EGD 11/30/17  LR 10/22/17    em

## 2018-04-13 NOTE — TELEPHONE ENCOUNTER
From: Bj Yanez  To:  Silva Cabrera MD  Sent: 4/10/2018 5:18 PM CDT  Subject: Prescription Question    I appreciate that my prescription was submitted for a refill, however it was sent to a pharmacy I can't use any longer due to an ins

## 2018-04-13 NOTE — TELEPHONE ENCOUNTER
From: Claudia Castaneda  Sent: 4/7/2018 9:20 PM CDT  Subject: Medication Renewal Request    Zahraon Annie.  Beau would like a refill of the following medications:     Rosuvastatin Calcium (CRESTOR) 5 MG Oral Tab Valerie Escoto MD]    Preferred pharmacy:

## 2018-04-17 ENCOUNTER — HOSPITAL ENCOUNTER (OUTPATIENT)
Dept: MAMMOGRAPHY | Facility: HOSPITAL | Age: 53
Discharge: HOME | End: 2018-04-17
Attending: INTERNAL MEDICINE
Payer: COMMERCIAL

## 2018-04-17 DIAGNOSIS — R92.8 ABNORMALITY OF RIGHT BREAST ON SCREENING MAMMOGRAM: ICD-10-CM

## 2018-04-17 PROCEDURE — 77065 DX MAMMO INCL CAD UNI: CPT | Performed by: INTERNAL MEDICINE

## 2018-05-17 ENCOUNTER — TELEPHONE (OUTPATIENT)
Dept: ENDOCRINOLOGY CLINIC | Facility: CLINIC | Age: 53
End: 2018-05-17

## 2018-05-17 NOTE — TELEPHONE ENCOUNTER
Patient is due for next prolia injection on or after 6/11/18. Last prolia injection: 12/8/17  Last [facility] protocol prolia labs (bone alk phos, CMP,PTH): 12/2/17. Last vitamin D level: 24.8 (10/7/17)  Patient will require an appt with RN for this injection.

## 2018-06-04 NOTE — TELEPHONE ENCOUNTER
Checked [employer] assist online. Did not see that IV has been completed yet. Called [employer] assist to check status of IV. Representative will submit a stat request for IV completion.

## 2018-06-06 ENCOUNTER — PATIENT MESSAGE (OUTPATIENT)
Dept: ENDOCRINOLOGY CLINIC | Facility: CLINIC | Age: 53
End: 2018-06-06

## 2018-06-06 NOTE — TELEPHONE ENCOUNTER
Summary of benefits received from [address] is covered but will require PA. Once PA is approved estimated OOP cost is 10%. Submitted PA with [employer] Will await response.

## 2018-06-07 NOTE — TELEPHONE ENCOUNTER
From: Claudeen Maffucci Cronister  To: Anthony Guerra MD  Sent: 6/6/2018 7:57 PM CDT  Subject: Non-Urgent Medical Question    I need to make an appointment to get my Prolia injection. I had a change in insurance 1/1 of this year to Jacquelin Stern.  Do we need to get Pre-

## 2018-06-11 NOTE — TELEPHONE ENCOUNTER
PA has been approved for prolia. Effective 6/6/18-6/6/19. Authorization number [id number]. Called the patient to schedule. Booked RN appt for tomorrow.

## 2018-06-12 ENCOUNTER — NURSE ONLY (OUTPATIENT)
Dept: ENDOCRINOLOGY CLINIC | Facility: CLINIC | Age: 53
End: 2018-06-12

## 2018-06-12 DIAGNOSIS — M81.0 AGE-RELATED OSTEOPOROSIS WITHOUT CURRENT PATHOLOGICAL FRACTURE: Primary | ICD-10-CM

## 2018-06-12 PROCEDURE — 96372 THER/PROPH/DIAG INJ SC/IM: CPT | Performed by: INTERNAL MEDICINE

## 2018-07-03 RX ORDER — ERGOCALCIFEROL 1.25 MG/1
CAPSULE ORAL
Qty: 8 CAPSULE | Refills: 0 | Status: SHIPPED | OUTPATIENT
Start: 2018-07-03 | End: 2018-10-11

## 2018-10-10 ENCOUNTER — PATIENT MESSAGE (OUTPATIENT)
Dept: INTERNAL MEDICINE CLINIC | Facility: CLINIC | Age: 53
End: 2018-10-10

## 2018-10-10 DIAGNOSIS — R92.8 ABNORMALITY OF BOTH BREASTS ON SCREENING MAMMOGRAPHY: ICD-10-CM

## 2018-10-10 DIAGNOSIS — Z12.39 BREAST SCREENING: Primary | ICD-10-CM

## 2018-10-11 NOTE — TELEPHONE ENCOUNTER
Last mammogram screening on 9/30/17   Last appointment on 12/8/17. Pended mammogram.      From: Francisco Darden  To: Irene Isaacs MD  Sent: 10/10/2018  8:51 PM CDT  Subject: Other    I need an order for my mammogram. It's due in October.  It may nee

## 2018-10-12 RX ORDER — ROSUVASTATIN CALCIUM 5 MG/1
TABLET, COATED ORAL
Qty: 90 TABLET | Refills: 0 | Status: SHIPPED | OUTPATIENT
Start: 2018-10-12 | End: 2019-03-29

## 2018-10-12 RX ORDER — ERGOCALCIFEROL 1.25 MG/1
CAPSULE ORAL
Qty: 8 CAPSULE | Refills: 0 | Status: SHIPPED | OUTPATIENT
Start: 2018-10-12 | End: 2018-12-14

## 2018-10-12 RX ORDER — PANTOPRAZOLE SODIUM 40 MG/1
TABLET, DELAYED RELEASE ORAL
Qty: 90 TABLET | Refills: 0 | Status: SHIPPED | OUTPATIENT
Start: 2018-10-12 | End: 2019-02-01

## 2018-10-12 NOTE — TELEPHONE ENCOUNTER
LOV 12/8/17. RTC 6 months. Receives prolia. No F/U scheduled. Last prolia inj 6/2018 will be due in December.

## 2018-10-26 ENCOUNTER — HOSPITAL ENCOUNTER (OUTPATIENT)
Dept: MAMMOGRAPHY | Facility: HOSPITAL | Age: 53
Discharge: HOME | End: 2018-10-26
Attending: INTERNAL MEDICINE
Payer: COMMERCIAL

## 2018-10-26 DIAGNOSIS — R92.8 ABNORMALITY OF BOTH BREASTS ON SCREENING MAMMOGRAPHY: ICD-10-CM

## 2018-10-26 PROCEDURE — 77062 BREAST TOMOSYNTHESIS BI: CPT | Performed by: INTERNAL MEDICINE

## 2018-10-26 PROCEDURE — 77066 DX MAMMO INCL CAD BI: CPT | Performed by: INTERNAL MEDICINE

## 2018-11-23 ENCOUNTER — TELEPHONE (OUTPATIENT)
Dept: ENDOCRINOLOGY CLINIC | Facility: CLINIC | Age: 53
End: 2018-11-23

## 2018-11-23 DIAGNOSIS — M81.8 OTHER OSTEOPOROSIS WITHOUT CURRENT PATHOLOGICAL FRACTURE: Primary | ICD-10-CM

## 2018-11-23 NOTE — TELEPHONE ENCOUNTER
Patient due for follow up and repeat Prolia injection in December. Due for follow up with MD or APN. Called and booked with Sree Roque on 12/14. Orders for labs placed for [facility] and mailed to patient per [facility] protocol. IV submitted.

## 2018-11-30 ENCOUNTER — OFFICE VISIT (OUTPATIENT)
Dept: OBGYN CLINIC | Facility: CLINIC | Age: 53
End: 2018-11-30
Payer: COMMERCIAL

## 2018-11-30 ENCOUNTER — OFFICE VISIT (OUTPATIENT)
Dept: INTERNAL MEDICINE CLINIC | Facility: CLINIC | Age: 53
End: 2018-11-30
Payer: COMMERCIAL

## 2018-11-30 ENCOUNTER — TELEPHONE (OUTPATIENT)
Dept: ENDOCRINOLOGY CLINIC | Facility: CLINIC | Age: 53
End: 2018-11-30

## 2018-11-30 VITALS
SYSTOLIC BLOOD PRESSURE: 135 MMHG | BODY MASS INDEX: 43 KG/M2 | HEART RATE: 90 BPM | RESPIRATION RATE: 16 BRPM | DIASTOLIC BLOOD PRESSURE: 94 MMHG | HEIGHT: 63 IN

## 2018-11-30 VITALS
SYSTOLIC BLOOD PRESSURE: 131 MMHG | HEIGHT: 63 IN | WEIGHT: 244 LBS | HEART RATE: 101 BPM | DIASTOLIC BLOOD PRESSURE: 99 MMHG | BODY MASS INDEX: 43.23 KG/M2

## 2018-11-30 DIAGNOSIS — Z00.00 ROUTINE GENERAL MEDICAL EXAMINATION AT A HEALTH CARE FACILITY: ICD-10-CM

## 2018-11-30 DIAGNOSIS — Z11.3 SCREEN FOR STD (SEXUALLY TRANSMITTED DISEASE): ICD-10-CM

## 2018-11-30 DIAGNOSIS — R92.8 ABNORMALITY OF RIGHT BREAST ON SCREENING MAMMOGRAM: ICD-10-CM

## 2018-11-30 DIAGNOSIS — Z01.419 ENCOUNTER FOR GYNECOLOGICAL EXAMINATION: Primary | ICD-10-CM

## 2018-11-30 DIAGNOSIS — M81.0 AGE-RELATED OSTEOPOROSIS WITHOUT CURRENT PATHOLOGICAL FRACTURE: Primary | ICD-10-CM

## 2018-11-30 DIAGNOSIS — E78.49 OTHER HYPERLIPIDEMIA: ICD-10-CM

## 2018-11-30 DIAGNOSIS — N76.2 ACUTE VULVITIS: ICD-10-CM

## 2018-11-30 DIAGNOSIS — I10 ESSENTIAL HYPERTENSION: ICD-10-CM

## 2018-11-30 DIAGNOSIS — K44.9 HIATAL HERNIA: ICD-10-CM

## 2018-11-30 DIAGNOSIS — K76.89 FOCAL NODULAR HYPERPLASIA OF LIVER: ICD-10-CM

## 2018-11-30 DIAGNOSIS — Z23 NEED FOR VACCINATION: ICD-10-CM

## 2018-11-30 PROCEDURE — 99212 OFFICE O/P EST SF 10 MIN: CPT | Performed by: INTERNAL MEDICINE

## 2018-11-30 PROCEDURE — 90686 IIV4 VACC NO PRSV 0.5 ML IM: CPT | Performed by: INTERNAL MEDICINE

## 2018-11-30 PROCEDURE — 99396 PREV VISIT EST AGE 40-64: CPT | Performed by: OBSTETRICS & GYNECOLOGY

## 2018-11-30 PROCEDURE — 90471 IMMUNIZATION ADMIN: CPT | Performed by: INTERNAL MEDICINE

## 2018-11-30 PROCEDURE — 99214 OFFICE O/P EST MOD 30 MIN: CPT | Performed by: INTERNAL MEDICINE

## 2018-11-30 RX ORDER — SUMATRIPTAN 50 MG/1
TABLET, FILM COATED ORAL
Qty: 9 TABLET | Refills: 3 | Status: SHIPPED | OUTPATIENT
Start: 2018-11-30 | End: 2020-08-06

## 2018-11-30 RX ORDER — METOPROLOL SUCCINATE 25 MG/1
25 TABLET, EXTENDED RELEASE ORAL DAILY
Qty: 90 TABLET | Refills: 1 | Status: SHIPPED | OUTPATIENT
Start: 2018-11-30 | End: 2019-03-29

## 2018-11-30 NOTE — ASSESSMENT & PLAN NOTE
Patient does have a pretty large hiatal hernia with part of the stomach in the thoracic cavity. She has been doing well on the pantoprazole. She is asymptomatic otherwise. She has gained some weight at this time which may cause recurrence of symptoms.

## 2018-11-30 NOTE — TELEPHONE ENCOUNTER
Pt would like to  a copy of lab orders today at [facility]. Please call when order is at . OK to leave detailed message.

## 2018-11-30 NOTE — ASSESSMENT & PLAN NOTE
Patient has been seen by gastroenterology and has completed an MRI of the liver.   She is advised to continue to monitor per gastroenterology

## 2018-11-30 NOTE — PATIENT INSTRUCTIONS
Problem List Items Addressed This Visit        Unprioritized    Abnormality of right breast on screening mammogram     Follow-up diagnostic mammogram with ultrasound ordered to be completed in April         Relevant Orders    MYESHA TOOTIE 2D+3D DIAGNOSTIC MYESHA breastfeeding. .Normal  exam.  Labs ordered. Breast exam normal–no palpable abnormalities, discharge from the nipples or axillary lymphadenopathy. No cervical or inguinal lymphadenopathy. 6 Month follow-up mammogram due in April–ordered today.   Hernia

## 2018-11-30 NOTE — PROGRESS NOTES
HPI:    Patient ID: Ziggy Briggs is a 47year old female.     PHYSICAL    Pap completed today    Colonoscopy due on 05/04/2022    Mammogram due on 10/26/2019  Due for a 6 month  F/u diag right breast.a    Immunization History  Administered HRS-ONLY 2 IN 24 HR MAX Disp: 9 tablet Rfl: 3   Metoprolol Succinate ER 25 MG Oral Tablet 24 Hr Take 1 tablet (25 mg total) by mouth daily.  Disp: 90 tablet Rfl: 1   ERGOCALCIFEROL 13005 units Oral Cap TAKE 2 CAPSULES BY MOUTH WEEKLY Disp: 8 capsule Rfl: 0 tenderness. There is no rebound. Hernia confirmed negative in the right inguinal area and confirmed negative in the left inguinal area.    Genitourinary: Rectum normal and uterus normal. Rectal exam shows no external hemorrhoid, no internal hemorrhoid, no f lymphadenopathy. No cervical or inguinal lymphadenopathy. 6 Month follow-up mammogram due in April–ordered today. Hernial orifices are intact. Skin check with multiple nevi–needs follow-up by dermatology–Dr. Cristi Vance, [phone] for an appointment.   Rect Blood pressure (!) 135/94, pulse 90, resp. rate 16, height 5' 3\" (1.6 m), last menstrual period 04/01/2014, not currently breastfeeding. Blood pressures look slightly elevated at this time.   Patient has been under considerable amount of stress and has ga

## 2018-11-30 NOTE — ASSESSMENT & PLAN NOTE
Lipid panel and liver function tests have been stable on Crestor at 5 mg daily. Continue the same dose of medications and recheck labs as ordered.

## 2018-11-30 NOTE — ASSESSMENT & PLAN NOTE
Blood pressure (!) 135/94, pulse 90, resp. rate 16, height 5' 3\" (1.6 m), last menstrual period 04/01/2014, not currently breastfeeding. Blood pressures look slightly elevated at this time.   Patient has been under considerable amount of stress and has ga

## 2018-11-30 NOTE — PROGRESS NOTES
Ziggy Briggs is a 47year old female [id number] Patient's last menstrual period was 04/01/2014. here for annual exam.       Last seen 10/20/17. Last pap 10/20/17 normal with positive HPV, negative HPV 16/18. Had colpo in 2016-- REDD 1.   Had LEEP do 90 tablet Rfl: 0   PANTOPRAZOLE SODIUM 40 MG Oral Tab EC TAKE 1 TABLET BY MOUTH EVERY MORNING BEFORE BREAKFAST Disp: 90 tablet Rfl: 0   hydrochlorothiazide 25 MG Oral Tab Take 0.5 tablets (12.5 mg total) by mouth daily.  Disp: 90 tablet Rfl: 1   SUMAtriptan distribution, and no lesions. One small patch of redness on left labia majora. Mild erythema. Urethral Meatus:  normal in size, location, without lesions and prolapse.   Hypermobility of bladder neck with valsalva  Bladder:  No fullness, masses or tend

## 2018-11-30 NOTE — TELEPHONE ENCOUNTER
IV received and PA required. Submitted PA today and will follow up. After approval will owe 10% of cost of injection.

## 2018-11-30 NOTE — ASSESSMENT & PLAN NOTE
Blood pressure (!) 135/94, pulse 90, resp. rate 16, height 5' 3\" (1.6 m), last menstrual period 04/01/2014, not currently breastfeeding. .Normal  exam.  Labs ordered.   Breast exam normal–no palpable abnormalities, discharge from the nipples or axillary

## 2018-12-07 ENCOUNTER — TELEPHONE (OUTPATIENT)
Dept: OBGYN CLINIC | Facility: CLINIC | Age: 53
End: 2018-12-07

## 2018-12-07 NOTE — TELEPHONE ENCOUNTER
Informed pt of results and JLK recs below. Assisted pt with scheduling appt on 12/31/18 at 11:10am for Colposcopy. Pt states she is postmenopausal so no HCG needed. Pt verbalized understanding.

## 2018-12-07 NOTE — TELEPHONE ENCOUNTER
----- Message from Joshua Davenport MD sent at 12/7/2018 10:18 AM CST -----  Pap-- ASCUS with positive HPV. Needs colposcopy. Negative gc/chlamydia/trichomonas.   BV screen negative

## 2018-12-07 NOTE — PROGRESS NOTES
Pap-- ASCUS with positive HPV. Needs colposcopy. Negative gc/chlamydia/trichomonas.   BV screen negative

## 2018-12-14 ENCOUNTER — OFFICE VISIT (OUTPATIENT)
Dept: ENDOCRINOLOGY CLINIC | Facility: CLINIC | Age: 53
End: 2018-12-14
Payer: COMMERCIAL

## 2018-12-14 VITALS
HEART RATE: 90 BPM | HEIGHT: 63 IN | RESPIRATION RATE: 20 BRPM | SYSTOLIC BLOOD PRESSURE: 145 MMHG | BODY MASS INDEX: 43.77 KG/M2 | DIASTOLIC BLOOD PRESSURE: 99 MMHG | WEIGHT: 247 LBS

## 2018-12-14 DIAGNOSIS — E78.49 OTHER HYPERLIPIDEMIA: ICD-10-CM

## 2018-12-14 DIAGNOSIS — M81.8 OTHER OSTEOPOROSIS WITHOUT CURRENT PATHOLOGICAL FRACTURE: Primary | ICD-10-CM

## 2018-12-14 DIAGNOSIS — I10 ESSENTIAL HYPERTENSION: ICD-10-CM

## 2018-12-14 PROCEDURE — 96372 THER/PROPH/DIAG INJ SC/IM: CPT | Performed by: NURSE PRACTITIONER

## 2018-12-14 PROCEDURE — 99213 OFFICE O/P EST LOW 20 MIN: CPT | Performed by: NURSE PRACTITIONER

## 2018-12-14 PROCEDURE — 99212 OFFICE O/P EST SF 10 MIN: CPT | Performed by: NURSE PRACTITIONER

## 2018-12-14 RX ORDER — ERGOCALCIFEROL 1.25 MG/1
CAPSULE ORAL
Qty: 26 CAPSULE | Refills: 1 | Status: SHIPPED | OUTPATIENT
Start: 2018-12-14 | End: 2019-05-15

## 2018-12-14 NOTE — PATIENT INSTRUCTIONS
Bone specific alk phos   Vit D   Ca   Cr   Prior to next Prolia     DEXA / Bone Scan in Jan / Call to schedule     Vitamin D supplement   Ergocalciferol 100,000 units per week - refill sent     Follow up with primary care high blood pressure

## 2018-12-14 NOTE — PROGRESS NOTES
Name: Claudia Castaneda  Date: 12/14/2018    Referring Physician: No ref.  provider found    Osteoporosis     HISTORY OF PRESENT ILLNESS   Claudia Castaneda is a 47year old female who presents for Patient presents with:  Osteoporosis    48 y/o F presen urinating  Psychiatric:  no acute distress, anxiety  or depression  Skin: normal moisturized skin    Medications:     Current Outpatient Medications:   •  ergocalciferol 76330 units Oral Cap, TAKE 2 CAPSULES BY MOUTH WEEKLY, Disp: 26 capsule, Rfl: 1  •  be Appearance:  Alert, in no acute distress, well developed  Eyes: normal conjunctivae, sclera.   Ears/Nose/Mouth/Throat/Neck:  normal hearing, normal speech   Neurologic: sensory grossly intact and motor grossly intact  Respiratory:  clear to auscultation mamta elevated call PCP   No change in BP over last 2 weeks (primary care office and today)     RTC q 6 months    This is a 25 minute visit and greater than 50% of the time was spent counseling the patient and/ or coordinating care.      Adriano Alan, APRN

## 2018-12-31 ENCOUNTER — OFFICE VISIT (OUTPATIENT)
Dept: OBGYN CLINIC | Facility: CLINIC | Age: 53
End: 2018-12-31
Payer: COMMERCIAL

## 2018-12-31 VITALS — SYSTOLIC BLOOD PRESSURE: 118 MMHG | HEART RATE: 80 BPM | DIASTOLIC BLOOD PRESSURE: 87 MMHG

## 2018-12-31 DIAGNOSIS — R87.810 CERVICAL HIGH RISK HUMAN PAPILLOMAVIRUS (HPV) DNA TEST POSITIVE: ICD-10-CM

## 2018-12-31 DIAGNOSIS — R87.610 PAPANICOLAOU SMEAR OF CERVIX WITH ATYPICAL SQUAMOUS CELLS OF UNDETERMINED SIGNIFICANCE (ASC-US): Primary | ICD-10-CM

## 2018-12-31 PROCEDURE — 57454 BX/CURETT OF CERVIX W/SCOPE: CPT | Performed by: OBSTETRICS & GYNECOLOGY

## 2018-12-31 NOTE — PROCEDURES
Colpo w/Cx Biopsy and ECC    Birth control method(s) used: menopause    Consent signed. Procedure discussed with patient in detail including indication, risk, benefits, alternatives and complications. Indication: ASCUS with positive HPV.   Had colpo i

## 2019-01-21 ENCOUNTER — HOSPITAL ENCOUNTER (OUTPATIENT)
Dept: BONE DENSITY | Facility: HOSPITAL | Age: 54
Discharge: HOME | End: 2019-01-21
Attending: NURSE PRACTITIONER
Payer: COMMERCIAL

## 2019-01-21 DIAGNOSIS — M81.8 OTHER OSTEOPOROSIS WITHOUT CURRENT PATHOLOGICAL FRACTURE: ICD-10-CM

## 2019-01-21 PROCEDURE — 77080 DXA BONE DENSITY AXIAL: CPT | Performed by: NURSE PRACTITIONER

## 2019-01-28 ENCOUNTER — PATIENT MESSAGE (OUTPATIENT)
Dept: ENDOCRINOLOGY CLINIC | Facility: CLINIC | Age: 54
End: 2019-01-28

## 2019-01-28 NOTE — TELEPHONE ENCOUNTER
From: Francisco Darden  To: GARETT Paz  Sent: 1/28/2019 12:06 PM CST  Subject: Test Results Question    I have a question about XR DEXA BONE DENSITOMETRY (CPT=77080) resulted on 1/21/19, 10:54 AM.    Does this mean I'm classified as having o

## 2019-02-01 ENCOUNTER — OFFICE VISIT (OUTPATIENT)
Dept: INTERNAL MEDICINE CLINIC | Facility: CLINIC | Age: 54
End: 2019-02-01
Payer: COMMERCIAL

## 2019-02-01 VITALS
HEIGHT: 63 IN | RESPIRATION RATE: 16 BRPM | SYSTOLIC BLOOD PRESSURE: 131 MMHG | HEART RATE: 94 BPM | DIASTOLIC BLOOD PRESSURE: 90 MMHG | WEIGHT: 240 LBS | BODY MASS INDEX: 42.52 KG/M2

## 2019-02-01 DIAGNOSIS — I10 ESSENTIAL HYPERTENSION: Primary | ICD-10-CM

## 2019-02-01 PROCEDURE — 99212 OFFICE O/P EST SF 10 MIN: CPT | Performed by: INTERNAL MEDICINE

## 2019-02-01 PROCEDURE — 99214 OFFICE O/P EST MOD 30 MIN: CPT | Performed by: INTERNAL MEDICINE

## 2019-02-01 RX ORDER — PANTOPRAZOLE SODIUM 40 MG/1
TABLET, DELAYED RELEASE ORAL
Qty: 90 TABLET | Refills: 2 | Status: SHIPPED | OUTPATIENT
Start: 2019-02-01 | End: 2019-06-14

## 2019-02-01 RX ORDER — HYDROCHLOROTHIAZIDE 25 MG/1
25 TABLET ORAL DAILY
Qty: 90 TABLET | Refills: 1 | Status: SHIPPED | OUTPATIENT
Start: 2019-02-01 | End: 2019-06-14

## 2019-02-01 RX ORDER — OLMESARTAN MEDOXOMIL 20 MG/1
20 TABLET ORAL DAILY
Qty: 90 TABLET | Refills: 1 | Status: SHIPPED | OUTPATIENT
Start: 2019-02-01 | End: 2019-02-05 | Stop reason: RX

## 2019-02-01 NOTE — ASSESSMENT & PLAN NOTE
Blood pressure 131/90, pulse 94, resp. rate 16, height 5' 3\" (1.6 m), weight 240 lb (108.9 kg), last menstrual period 04/01/2014, not currently breastfeeding. Elevated blood pressure–will need slightly improved at this time.   Currently on metoprolol 25 m

## 2019-02-01 NOTE — PATIENT INSTRUCTIONS
Problem List Items Addressed This Visit        Unprioritized    Essential hypertension - Primary     Blood pressure 131/90, pulse 94, resp.  rate 16, height 5' 3\" (1.6 m), weight 240 lb (108.9 kg), last menstrual period 04/01/2014, not currently breastfeed

## 2019-02-01 NOTE — PROGRESS NOTES
HPI:    Patient ID: Tata Mares is a 47year old female. Mri abromen 11/2017    CONCLUSION:   1. There is indeterminate arterially enhancing lesion at the border of segments V and VI. This is not seen on the remaining sequences.  The foremost dif Negative for chest pain and orthopnea. Gastrointestinal: Negative. Endocrine: Negative. Genitourinary: Negative. Musculoskeletal: Negative. Skin: Negative. Allergic/Immunologic: Negative. Neurological: Negative.     Hematological: Flavia Mor light. Right eye exhibits no discharge. Left eye exhibits no discharge. Neck: Normal range of motion. Neck supple. No JVD present. No thyromegaly present. Cardiovascular: Normal rate, regular rhythm, normal heart sounds and intact distal pulses.    No m MG Oral Tab    hydrochlorothiazide 25 MG Oral Tab    Other Relevant Orders    COMP METABOLIC PANEL (14)          Return in about 6 weeks (around 3/15/2019).     PT UNDERSTANDS AND AGREES TO FOLLOW DIRECTIONS AND ADVICE    Orders Placed This Encounter      C

## 2019-02-05 ENCOUNTER — TELEPHONE (OUTPATIENT)
Dept: INTERNAL MEDICINE CLINIC | Facility: CLINIC | Age: 54
End: 2019-02-05

## 2019-02-05 RX ORDER — OLMESARTAN MEDOXOMIL 40 MG/1
TABLET ORAL
Qty: 45 TABLET | Refills: 1 | OUTPATIENT
Start: 2019-02-05 | End: 2019-04-18 | Stop reason: RX

## 2019-02-05 NOTE — TELEPHONE ENCOUNTER
May take olmesartan 40 mgs 1/2 a tab if available or change to valsartan 80 mgs po q d x 30 days,3 refills

## 2019-02-05 NOTE — TELEPHONE ENCOUNTER
pls see pharmacy message below. I called [facility] and they inform me that Olmesartan 20 mg is not available. What is available is the 5 mg Olmesartan . So you can change the script to pt to take 4 tablets of the 5 mg or Brand name Benicar 20mg but

## 2019-02-05 NOTE — TELEPHONE ENCOUNTER
Current Outpatient Medications:  Olmesartan Medoxomil 20 MG Oral Tab Take 1 tablet (20 mg total) by mouth daily. Disp: 90 tablet Rfl: 1     Per pharamcy: we are requesting to change above medication.  Please provide a replacement r/x including drug, stren

## 2019-03-08 ENCOUNTER — OFFICE VISIT (OUTPATIENT)
Dept: DERMATOLOGY CLINIC | Facility: CLINIC | Age: 54
End: 2019-03-08
Payer: COMMERCIAL

## 2019-03-08 DIAGNOSIS — D48.5 NEOPLASM OF UNCERTAIN BEHAVIOR OF SKIN: Primary | ICD-10-CM

## 2019-03-08 PROCEDURE — 99203 OFFICE O/P NEW LOW 30 MIN: CPT | Performed by: DERMATOLOGY

## 2019-03-08 PROCEDURE — 11102 TANGNTL BX SKIN SINGLE LES: CPT | Performed by: DERMATOLOGY

## 2019-03-08 PROCEDURE — 99212 OFFICE O/P EST SF 10 MIN: CPT | Performed by: DERMATOLOGY

## 2019-03-08 PROCEDURE — 88305 TISSUE EXAM BY PATHOLOGIST: CPT | Performed by: DERMATOLOGY

## 2019-03-10 LAB
ALBUMIN/GLOBULIN RATIO: 1.8 (CALC) (ref 1–2.5)
ALBUMIN: 4.2 G/DL (ref 3.6–5.1)
ALKALINE PHOSPHATASE: 86 U/L (ref 33–130)
ALT: 10 U/L (ref 6–29)
AST: 15 U/L (ref 10–35)
BILIRUBIN, TOTAL: 0.5 MG/DL (ref 0.2–1.2)
BUN: 13 MG/DL (ref 7–25)
CALCIUM: 9.1 MG/DL (ref 8.6–10.4)
CARBON DIOXIDE: 26 MMOL/L (ref 20–32)
CHLORIDE: 99 MMOL/L (ref 98–110)
CREATININE: 0.75 MG/DL (ref 0.5–1.05)
EGFR IF AFRICN AM: 106 ML/MIN/1.73M2
EGFR IF NONAFRICN AM: 92 ML/MIN/1.73M2
GLOBULIN: 2.3 G/DL (CALC) (ref 1.9–3.7)
GLUCOSE: 78 MG/DL (ref 65–99)
POTASSIUM: 3.4 MMOL/L (ref 3.5–5.3)
PROTEIN, TOTAL: 6.5 G/DL (ref 6.1–8.1)
SODIUM: 137 MMOL/L (ref 135–146)

## 2019-03-12 NOTE — PROGRESS NOTES
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  No further surgery at this time. .  Please log in test results, send biopsy results letter. Pt to rtc 6 months or prn.

## 2019-03-18 NOTE — PROGRESS NOTES
Operative Report                     Shave/  Tangential biopsy     Clinical diagnosis:    Size of lesion:    Location:  Diagnosis/Clinical History: pt with new red growing lesion  Spec 1 Description >>>>>: left upper chest  Spec 1 Comment: r/o bcc re

## 2019-03-18 NOTE — PROGRESS NOTES
Sara Bhardwaj is a 47year old female. HPI:     CC:  Patient presents with:  Full Skin Exam: LOV 12/26/2012. Pt presenting for full body skin exam. Pt has a personal hx of AKs and family hx of unknown type of skin cancer (father).    Lesion: Pt vivienne 30 g Rfl: 0   SUMAtriptan Succinate (IMITREX) 50 MG Oral Tab Use at onset; repeat once after 2 HRS-ONLY 2 IN 24 HR MAX Disp: 9 tablet Rfl: 3     Allergies:   No Known Allergies    Past Medical History:   Diagnosis Date   • Lumbar radiculopathy    • Obesity Occupational Exposure: Not Asked        Hobby Hazards: Not Asked        Sleep Concern: Not Asked        Stress Concern: Not Asked        Weight Concern: Not Asked        Special Diet: Not Asked        Back Care: Not Asked        Exercise: Not Asked legs, palms. Multiple light to medium brown, well marginated, uniformly pigmented, macules and papules 6 mm and less scattered on exam. pigmented lesions examined with dermoscopy benign-appearing patterns.      Waxy tannish keratotic papules scattered, Medications in grid. Instructions reviewed at length. Benign nevi, seborrheic  keratoses, cherry angiomas:  Reassurance regarding other benign skin lesions. Signs and symptoms of skin cancer, ABCDE's of melanoma discussed with patient.  Sunscreen use, s

## 2019-03-29 ENCOUNTER — OFFICE VISIT (OUTPATIENT)
Dept: INTERNAL MEDICINE CLINIC | Facility: CLINIC | Age: 54
End: 2019-03-29
Payer: COMMERCIAL

## 2019-03-29 VITALS
WEIGHT: 233 LBS | SYSTOLIC BLOOD PRESSURE: 103 MMHG | HEART RATE: 84 BPM | HEIGHT: 63 IN | DIASTOLIC BLOOD PRESSURE: 73 MMHG | RESPIRATION RATE: 18 BRPM | BODY MASS INDEX: 41.29 KG/M2

## 2019-03-29 DIAGNOSIS — K76.89 FOCAL NODULAR HYPERPLASIA OF LIVER: ICD-10-CM

## 2019-03-29 DIAGNOSIS — E21.3 HYPERPARATHYROIDISM (HCC): ICD-10-CM

## 2019-03-29 DIAGNOSIS — E78.49 OTHER HYPERLIPIDEMIA: ICD-10-CM

## 2019-03-29 DIAGNOSIS — I10 ESSENTIAL HYPERTENSION: Primary | ICD-10-CM

## 2019-03-29 PROCEDURE — 99212 OFFICE O/P EST SF 10 MIN: CPT | Performed by: INTERNAL MEDICINE

## 2019-03-29 PROCEDURE — 99214 OFFICE O/P EST MOD 30 MIN: CPT | Performed by: INTERNAL MEDICINE

## 2019-03-29 RX ORDER — ROSUVASTATIN CALCIUM 5 MG/1
TABLET, COATED ORAL
Qty: 90 TABLET | Refills: 2 | Status: SHIPPED | OUTPATIENT
Start: 2019-03-29 | End: 2019-06-14

## 2019-03-29 RX ORDER — METOPROLOL SUCCINATE 25 MG/1
25 TABLET, EXTENDED RELEASE ORAL DAILY
Qty: 90 TABLET | Refills: 2 | Status: SHIPPED | OUTPATIENT
Start: 2019-03-29 | End: 2019-06-24

## 2019-03-29 NOTE — ASSESSMENT & PLAN NOTE
Parathyroid hormone level–PTH levels have been gradually elevated. 70 in the past and now 80. Vitamin D levels were normal at the same time.   Alkaline phosphatase levels were normal.  Patient has been on Prolia for osteoporosis and is being monitored per

## 2019-03-29 NOTE — ASSESSMENT & PLAN NOTE
History of focal nodular hyperplasia noted on MRI of the liver in November 2017. She has not followed up with gastroenterology since it is overdue for the MRI which was supposed to be completed 6 months after the first study.   Orders provided today and wi

## 2019-03-29 NOTE — ASSESSMENT & PLAN NOTE
Blood pressure 103/73, pulse 84, resp. rate 18, height 5' 3\" (1.6 m), weight 233 lb (105.7 kg), last menstrual period 04/01/2014, not currently breastfeeding. Blood pressure looks stable, well controlled at this time.   She has been on hydrochlorothiazide

## 2019-03-29 NOTE — ASSESSMENT & PLAN NOTE
Blood pressure 103/73, pulse 84, resp. rate 18, height 5' 3\" (1.6 m), weight 233 lb (105.7 kg), last menstrual period 04/01/2014, not currently breastfeeding.   Lipid panel and liver function test have been stable on Crestor at 5 mg daily which she has tommie

## 2019-03-29 NOTE — PROGRESS NOTES
HPI:    Patient ID: Therese Ramachandran is a 47year old female. Labs discussed      Hypertension   This is a chronic problem. The current episode started more than 1 year ago.  The problem has been waxing and waning (Recent has had elevated blood pressu units Oral Cap TAKE 2 CAPSULES BY MOUTH WEEKLY Disp: 26 capsule Rfl: 1   SUMAtriptan Succinate (IMITREX) 50 MG Oral Tab Use at onset; repeat once after 2 HRS-ONLY 2 IN 24 HR MAX Disp: 9 tablet Rfl: 3   Pantoprazole Sodium 40 MG Oral Tab EC TAKE 1 TABLET BY reviewed. ASSESSMENT/PLAN:     Problem List Items Addressed This Visit        Unprioritized    Focal nodular hyperplasia of liver     History of focal nodular hyperplasia noted on MRI of the liver in November 2017.   She has not followed up with panel and liver function test have been stable on Crestor at 5 mg daily which she has tolerated well. Continue the same dose of medications and recheck labs as ordered.          Relevant Medications    Rosuvastatin Calcium 5 MG Oral Tab    Hyperparathyroid

## 2019-03-29 NOTE — PATIENT INSTRUCTIONS
Problem List Items Addressed This Visit        Unprioritized    Essential hypertension - Primary     Blood pressure 103/73, pulse 84, resp.  rate 18, height 5' 3\" (1.6 m), weight 233 lb (105.7 kg), last menstrual period 04/01/2014, not currently breastfeed on Prolia for osteoporosis and is being monitored per endocrinology. However I do think at this time the parathyroid levels are much higher than expected and will need to be further evaluated. May need a parathyroid scan.   Patient is advised to set up an

## 2019-04-01 ENCOUNTER — PATIENT MESSAGE (OUTPATIENT)
Dept: ENDOCRINOLOGY CLINIC | Facility: CLINIC | Age: 54
End: 2019-04-01

## 2019-04-01 NOTE — TELEPHONE ENCOUNTER
Pt would like to see [facility] sooner than FA -OK to use RN approval slot to see you sooner - end of May/beg of June? C/o hyperparathyroid?  Labs ordered by PCP    Please advise

## 2019-04-01 NOTE — TELEPHONE ENCOUNTER
From: Fortunato Yanez  To: John Solomon MD  Sent: 4/1/2019 8:05 AM CDT  Subject: Visit Follow-up Question    My PCP, Dr Bobby Shankar is concerned that my PTH has gone up. I'm due for a Prolia injection around 6/9.  I don't see any appointments available with

## 2019-04-05 ENCOUNTER — PATIENT MESSAGE (OUTPATIENT)
Dept: INTERNAL MEDICINE CLINIC | Facility: CLINIC | Age: 54
End: 2019-04-05

## 2019-04-05 NOTE — TELEPHONE ENCOUNTER
From: Landrum Merlin  To: Iliana Arellano MD  Sent: 4/5/2019 10:17 AM CDT  Subject: Visit Follow-up Question    Hi Dr Teja Jordan,    I scheduled my MRI at the hospital. [facility] and that turned out to be the best option for the ma

## 2019-04-06 NOTE — TELEPHONE ENCOUNTER
Please call in [address] [place] 23 mgs 1 tab po bid prn -2 tabs for anxiety /claustrophobia

## 2019-04-10 RX ORDER — ALPRAZOLAM 0.25 MG/1
TABLET ORAL
Qty: 2 TABLET | Refills: 0 | OUTPATIENT
Start: 2019-04-10 | End: 2019-12-06

## 2019-04-17 ENCOUNTER — TELEPHONE (OUTPATIENT)
Dept: INTERNAL MEDICINE CLINIC | Facility: CLINIC | Age: 54
End: 2019-04-17

## 2019-04-17 NOTE — TELEPHONE ENCOUNTER
Spoke with Samreen Leyva pharmacist--verifies Olmesartan 40 mg, 20 mg and 5 mg are all on backorder d/t other medications being on recall and were substituted with Olmesartan.     Leatha Ventura verifies availability of medications below are equal to [employer]

## 2019-04-17 NOTE — TELEPHONE ENCOUNTER
OLMESATTAN 40MG    PER [employer]  IS UNABLE TO SUPPLY THIS MEDICATION    PLEASE PROVIDE REPLACEMENT RX INCLUDING STRENGTH,DIRECTINS, 90 DAYSQTY AND REFILLS

## 2019-04-18 RX ORDER — VALSARTAN 80 MG/1
40 TABLET ORAL DAILY
Qty: 45 TABLET | Refills: 2 | Status: SHIPPED | OUTPATIENT
Start: 2019-04-18 | End: 2019-12-10

## 2019-04-26 ENCOUNTER — HOSPITAL ENCOUNTER (OUTPATIENT)
Dept: MAMMOGRAPHY | Facility: HOSPITAL | Age: 54
Discharge: HOME | End: 2019-04-26
Attending: INTERNAL MEDICINE
Payer: COMMERCIAL

## 2019-04-26 DIAGNOSIS — R92.8 ABNORMALITY OF RIGHT BREAST ON SCREENING MAMMOGRAM: ICD-10-CM

## 2019-04-26 PROCEDURE — 77065 DX MAMMO INCL CAD UNI: CPT | Performed by: INTERNAL MEDICINE

## 2019-04-26 PROCEDURE — 77061 BREAST TOMOSYNTHESIS UNI: CPT | Performed by: INTERNAL MEDICINE

## 2019-05-10 ENCOUNTER — HOSPITAL ENCOUNTER (OUTPATIENT)
Dept: MRI IMAGING | Facility: HOSPITAL | Age: 54
Discharge: HOME | End: 2019-05-10
Attending: INTERNAL MEDICINE
Payer: COMMERCIAL

## 2019-05-10 DIAGNOSIS — K76.89 FOCAL NODULAR HYPERPLASIA OF LIVER: ICD-10-CM

## 2019-05-10 PROCEDURE — 82565 ASSAY OF CREATININE: CPT

## 2019-05-10 PROCEDURE — A9575 INJ GADOTERATE MEGLUMI 0.1ML: HCPCS | Performed by: INTERNAL MEDICINE

## 2019-05-10 PROCEDURE — 74183 MRI ABD W/O CNTR FLWD CNTR: CPT | Performed by: INTERNAL MEDICINE

## 2019-05-14 NOTE — PROGRESS NOTES
Prolia IV completed. Authorization # [id number]. Authorization is effective from 06/07/19- 12/31/19. Pt already has appt booked for 6/14/19.

## 2019-05-15 RX ORDER — ERGOCALCIFEROL 1.25 MG/1
100000 CAPSULE ORAL WEEKLY
Qty: 26 CAPSULE | Refills: 1 | Status: SHIPPED | OUTPATIENT
Start: 2019-05-15 | End: 2019-11-11

## 2019-06-07 ENCOUNTER — PATIENT MESSAGE (OUTPATIENT)
Dept: INTERNAL MEDICINE CLINIC | Facility: CLINIC | Age: 54
End: 2019-06-07

## 2019-06-08 NOTE — TELEPHONE ENCOUNTER
From: Ankita Sessions  To: John Valles MD  Sent: 6/7/2019 7:10 PM CDT  Subject: Other    Hi Dr Violeta English,    Will you please forward the PTH and Vitamin D test results to Dr Rahel Hanna? I'm seeing both of you on 6/14. Thanks!

## 2019-06-14 ENCOUNTER — OFFICE VISIT (OUTPATIENT)
Dept: ENDOCRINOLOGY CLINIC | Facility: CLINIC | Age: 54
End: 2019-06-14
Payer: COMMERCIAL

## 2019-06-14 ENCOUNTER — OFFICE VISIT (OUTPATIENT)
Dept: INTERNAL MEDICINE CLINIC | Facility: CLINIC | Age: 54
End: 2019-06-14
Payer: COMMERCIAL

## 2019-06-14 VITALS
BODY MASS INDEX: 40.75 KG/M2 | WEIGHT: 230 LBS | SYSTOLIC BLOOD PRESSURE: 116 MMHG | RESPIRATION RATE: 16 BRPM | HEART RATE: 82 BPM | HEIGHT: 63 IN | DIASTOLIC BLOOD PRESSURE: 81 MMHG

## 2019-06-14 VITALS
HEART RATE: 85 BPM | DIASTOLIC BLOOD PRESSURE: 88 MMHG | BODY MASS INDEX: 41 KG/M2 | SYSTOLIC BLOOD PRESSURE: 134 MMHG | WEIGHT: 230 LBS

## 2019-06-14 DIAGNOSIS — I10 ESSENTIAL HYPERTENSION: ICD-10-CM

## 2019-06-14 DIAGNOSIS — R92.8 ABNORMAL SCREENING MAMMOGRAM: ICD-10-CM

## 2019-06-14 DIAGNOSIS — E78.49 OTHER HYPERLIPIDEMIA: ICD-10-CM

## 2019-06-14 DIAGNOSIS — K44.9 HIATAL HERNIA: ICD-10-CM

## 2019-06-14 DIAGNOSIS — E21.3 HYPERPARATHYROIDISM (HCC): ICD-10-CM

## 2019-06-14 DIAGNOSIS — M81.0 AGE-RELATED OSTEOPOROSIS WITHOUT CURRENT PATHOLOGICAL FRACTURE: Primary | ICD-10-CM

## 2019-06-14 DIAGNOSIS — K76.89 FOCAL NODULAR HYPERPLASIA OF LIVER: ICD-10-CM

## 2019-06-14 PROCEDURE — 99213 OFFICE O/P EST LOW 20 MIN: CPT | Performed by: INTERNAL MEDICINE

## 2019-06-14 PROCEDURE — 99214 OFFICE O/P EST MOD 30 MIN: CPT | Performed by: INTERNAL MEDICINE

## 2019-06-14 PROCEDURE — 99212 OFFICE O/P EST SF 10 MIN: CPT | Performed by: INTERNAL MEDICINE

## 2019-06-14 PROCEDURE — 96372 THER/PROPH/DIAG INJ SC/IM: CPT | Performed by: NURSE PRACTITIONER

## 2019-06-14 RX ORDER — HYDROCHLOROTHIAZIDE 25 MG/1
25 TABLET ORAL DAILY
Qty: 90 TABLET | Refills: 2 | Status: SHIPPED | OUTPATIENT
Start: 2019-06-14 | End: 2020-02-29

## 2019-06-14 RX ORDER — ROSUVASTATIN CALCIUM 5 MG/1
TABLET, COATED ORAL
Qty: 90 TABLET | Refills: 2 | Status: SHIPPED | OUTPATIENT
Start: 2019-06-14 | End: 2019-10-17

## 2019-06-14 RX ORDER — PANTOPRAZOLE SODIUM 40 MG/1
TABLET, DELAYED RELEASE ORAL
Qty: 90 TABLET | Refills: 2 | Status: SHIPPED | OUTPATIENT
Start: 2019-06-14 | End: 2020-02-29

## 2019-06-14 RX ORDER — PIMECROLIMUS 10 MG/G
CREAM TOPICAL
Qty: 30 G | Refills: 2 | Status: SHIPPED | OUTPATIENT
Start: 2019-06-14 | End: 2020-09-18

## 2019-06-14 NOTE — ASSESSMENT & PLAN NOTE
Blood pressure 116/81, pulse 82, resp. rate 16, height 5' 3\" (1.6 m), weight 230 lb (104.3 kg), last menstrual period 04/01/2014, not currently breastfeeding.   Stable blood pressure, very well controlled at this time on hydrochlorothiazide 25 mg daily and

## 2019-06-14 NOTE — ASSESSMENT & PLAN NOTE
PTH levels normalized at this time. Alkaline phosphatase levels look normal.  Patient is on Prolia and is being monitored per endocrinology. She will follow-up with Dr. Maite Mares for further management today. Call if any other concerns.   Will recheck the v

## 2019-06-14 NOTE — PROGRESS NOTES
Name: Ziggy Briggs  Date: 6/14/2019    Referring Physician: No ref. provider found    Patient presents with: Follow - Up: Prolia. Refills.        HISTORY OF PRESENT ILLNESS   Ziggy Briggs is a 47year old female who presents for Patient prese Current Outpatient Medications:   •  Pantoprazole Sodium 40 MG Oral Tab EC, TAKE 1 TABLET BY MOUTH EVERY MORNING BEFORE BREAKFAST, Disp: 90 tablet, Rfl: 2  •  hydrochlorothiazide 25 MG Oral Tab, Take 1 tablet (25 mg total) by mouth daily. , Disp: 90 tab hypertension        Surgical history:   Past Surgical History:   Procedure Laterality Date   • COLONOSCOPY  2012   • LEEP  2004       PHYSICAL EXAMINATION:  /88   Pulse 85   Wt 230 lb (104.3 kg)   LMP 04/01/2014   BMI 40.74 kg/m²     General Appearan

## 2019-06-14 NOTE — ASSESSMENT & PLAN NOTE
History of focal nodular hyperplasia that has remained stable per MRI evaluations. She is currently not on any estrogen supplementation.   Advised strict diet control to help with some weight loss as additional problems with hepatomegaly and hepatic steato

## 2019-06-14 NOTE — PATIENT INSTRUCTIONS
Problem List Items Addressed This Visit        Unprioritized    Essential hypertension     Blood pressure 116/81, pulse 82, resp. rate 16, height 5' 3\" (1.6 m), weight 230 lb (104.3 kg), last menstrual period 04/01/2014, not currently breastfeeding.   Stab VITAMIN D, 25-HYDROXY    Osteoporosis - Primary     Patient is currently on Prolia, tolerated it well.   DEXA scans and follow-up as per endocrinology         Other hyperlipidemia     Lipid panel and liver function tests look stable on Crestor at 5 mg daily

## 2019-06-14 NOTE — PROGRESS NOTES
HPI:    Patient ID: Riddhi Weston is a 47year old female. Mri liver    =====  CONCLUSION:   1.  There is re-identification of an arterially enhancing lesion at the inferior right hepatic lobe, perhaps minimally intervally increased in conspicuity Pertinent negatives include no chest pain or shortness of breath.  (Wt Readings from Last 6 Encounters:  06/14/19 : 230 lb (104.3 kg)  03/29/19 : 233 lb (105.7 kg)  02/01/19 : 240 lb (108.9 kg)  12/14/18 : 247 lb (112 kg)  11/30/18 : 244 lb (110.7 kg)  02/1 Apply 1 Application topically 2 (two) times daily.  Disp: 30 g Rfl: 0   SUMAtriptan Succinate (IMITREX) 50 MG Oral Tab Use at onset; repeat once after 2 HRS-ONLY 2 IN 24 HR MAX Disp: 9 tablet Rfl: 3   ALPRAZolam (XANAX) 0.25 MG Oral Tab One tab twice a day bulk of the stomach was still below the diaphragm. She does not have any significant symptoms of reflux. She has been evaluated per gastroenterology and has been on pantoprazole. She will follow-up with Dr. Simone Kumar if any worsening symptoms.   No dysphagi on Prolia and is being monitored per endocrinology. She will follow-up with Dr. Estrella Dunlap for further management today. Call if any other concerns. Will recheck the vitamin D levels at the next blood draw.          Relevant Orders    VITAMIN D, 25-HYDROXY

## 2019-06-14 NOTE — ASSESSMENT & PLAN NOTE
Lipid panel and liver function tests look stable on Crestor at 5 mg daily. She is tolerating the medications well.   Advised to continue the same dose of medication, watch the diet carefully, continue to restrict fatty foods, fried foods, desserts and suga

## 2019-06-14 NOTE — ASSESSMENT & PLAN NOTE
Patient with a history of a large hiatal hernia, UGI showed that the bulk of the stomach was still below the diaphragm. She does not have any significant symptoms of reflux. She has been evaluated per gastroenterology and has been on pantoprazole.   She w

## 2019-06-24 RX ORDER — METOPROLOL SUCCINATE 25 MG/1
25 TABLET, EXTENDED RELEASE ORAL DAILY
Qty: 90 TABLET | Refills: 1 | Status: SHIPPED | OUTPATIENT
Start: 2019-06-24 | End: 2019-12-30

## 2019-07-26 ENCOUNTER — PATIENT MESSAGE (OUTPATIENT)
Dept: DERMATOLOGY CLINIC | Facility: CLINIC | Age: 54
End: 2019-07-26

## 2019-07-27 NOTE — TELEPHONE ENCOUNTER
S/w pt. Pt seen 3/8/19 for a lesion on her chest, developed a \"dime-size\" keloid, requesting tx. Appt made for Wed evening for a possible kenalog inj/tx.

## 2019-07-31 ENCOUNTER — OFFICE VISIT (OUTPATIENT)
Dept: DERMATOLOGY CLINIC | Facility: CLINIC | Age: 54
End: 2019-07-31
Payer: COMMERCIAL

## 2019-07-31 DIAGNOSIS — L91.0 HYPERTROPHIC SCAR: Primary | ICD-10-CM

## 2019-07-31 DIAGNOSIS — D22.9 MULTIPLE NEVI: ICD-10-CM

## 2019-07-31 DIAGNOSIS — D23.9 BENIGN NEOPLASM OF SKIN, UNSPECIFIED LOCATION: ICD-10-CM

## 2019-07-31 PROCEDURE — 99213 OFFICE O/P EST LOW 20 MIN: CPT | Performed by: DERMATOLOGY

## 2019-08-12 NOTE — PROGRESS NOTES
Otf Rao is a 47year old female. HPI:     CC:  Patient presents with:  Derm Problem: LOV 3/8/2019. Pt presenting with keloid to chest. c/o pain 2/10. Lichenoid keratosis was prevoiusly bx at [address] [place] [address].          Allergies:  Patient has no known allerg tablet Rfl: 3   ALPRAZolam (XANAX) 0.25 MG Oral Tab One tab twice a day as needed for anxiety/claustrophobia Disp: 2 tablet Rfl: 0   betamethasone valerate 0.1 % External Cream Apply 1 Application topically 2 (two) times daily.  Disp: 30 g Rfl: 0     Allerg  Service: Not Asked        Blood Transfusions: Not Asked        Caffeine Concern: No        Occupational Exposure: Not Asked        Hobby Hazards: Not Asked        Sleep Concern: Not Asked        Stress Concern: Not Asked        Weight Concern: performed, including scalp, head, neck, face,nails, hair, external eyes, including conjunctival mucosa, eyelids, lips external ears, back, chest,/ breasts, axillae,  abdomen, arms, legs, palms.      Multiple light to medium brown, well marginated, uniformly benign keratoses. Fair skin no other suspicious lesions currently  History of AK's, family history skin cancer continue routine skin checks, sun protection    Please refer to map for specific lesions. See additional diagnoses.   Pros cons of various thera

## 2019-10-18 ENCOUNTER — HOSPITAL ENCOUNTER (OUTPATIENT)
Dept: MAMMOGRAPHY | Facility: HOSPITAL | Age: 54
Discharge: HOME | End: 2019-10-18
Attending: INTERNAL MEDICINE
Payer: COMMERCIAL

## 2019-10-18 DIAGNOSIS — R92.8 ABNORMAL SCREENING MAMMOGRAM: ICD-10-CM

## 2019-10-18 PROCEDURE — 77062 BREAST TOMOSYNTHESIS BI: CPT | Performed by: INTERNAL MEDICINE

## 2019-10-18 PROCEDURE — 77066 DX MAMMO INCL CAD BI: CPT | Performed by: INTERNAL MEDICINE

## 2019-10-18 RX ORDER — ROSUVASTATIN CALCIUM 5 MG/1
TABLET, COATED ORAL
Qty: 90 TABLET | Refills: 1 | Status: SHIPPED | OUTPATIENT
Start: 2019-10-18 | End: 2020-02-29

## 2019-10-18 NOTE — TELEPHONE ENCOUNTER
Refill passed per [facility], [facility] protocol.     Cholesterol Medications  Protocol Criteria:  · Appointment scheduled in the past 12 months or in the next 3 months  · ALT & LDL on file in the past 12 months  · ALT result < 80  · LDL result <130   Recent Outp

## 2019-11-11 RX ORDER — ERGOCALCIFEROL 1.25 MG/1
CAPSULE ORAL
Qty: 12 CAPSULE | Refills: 3 | Status: SHIPPED | OUTPATIENT
Start: 2019-11-11 | End: 2020-04-13

## 2019-11-14 ENCOUNTER — TELEPHONE (OUTPATIENT)
Dept: ENDOCRINOLOGY CLINIC | Facility: CLINIC | Age: 54
End: 2019-11-14

## 2019-11-14 NOTE — TELEPHONE ENCOUNTER
Received SOB for prolia. PA is needed. Pt is to owe 10% of cost. PA is on file and is valid from  6/07/19 - 12/31/19. Called to schedule apt with RN. PT schedule 12/23/19 with RN.

## 2019-12-01 ENCOUNTER — PATIENT MESSAGE (OUTPATIENT)
Dept: OBGYN CLINIC | Facility: CLINIC | Age: 54
End: 2019-12-01

## 2019-12-02 NOTE — TELEPHONE ENCOUNTER
From: Roby Simmonds  To: Vasu Zamora MD  Sent: 12/1/2019 10:01 AM CST  Subject: Non-Urgent Medical Question    I have an appointment on Friday. Is it possible to get a flu shot while I'm in? I don't have an appointment with Dr Marcus Ty until January.

## 2019-12-06 ENCOUNTER — OFFICE VISIT (OUTPATIENT)
Dept: OBGYN CLINIC | Facility: CLINIC | Age: 54
End: 2019-12-06
Payer: COMMERCIAL

## 2019-12-06 ENCOUNTER — IMMUNIZATION (OUTPATIENT)
Dept: INTERNAL MEDICINE CLINIC | Facility: CLINIC | Age: 54
End: 2019-12-06
Payer: COMMERCIAL

## 2019-12-06 VITALS
DIASTOLIC BLOOD PRESSURE: 90 MMHG | HEIGHT: 63 IN | HEART RATE: 85 BPM | SYSTOLIC BLOOD PRESSURE: 131 MMHG | BODY MASS INDEX: 39.23 KG/M2 | WEIGHT: 221.38 LBS

## 2019-12-06 DIAGNOSIS — Z01.419 ENCOUNTER FOR GYNECOLOGICAL EXAMINATION: Primary | ICD-10-CM

## 2019-12-06 DIAGNOSIS — Z12.4 SCREENING FOR MALIGNANT NEOPLASM OF CERVIX: ICD-10-CM

## 2019-12-06 DIAGNOSIS — Z23 NEED FOR VACCINATION: ICD-10-CM

## 2019-12-06 PROCEDURE — 99396 PREV VISIT EST AGE 40-64: CPT | Performed by: OBSTETRICS & GYNECOLOGY

## 2019-12-06 PROCEDURE — 90686 IIV4 VACC NO PRSV 0.5 ML IM: CPT | Performed by: INTERNAL MEDICINE

## 2019-12-06 PROCEDURE — 90471 IMMUNIZATION ADMIN: CPT | Performed by: INTERNAL MEDICINE

## 2019-12-06 NOTE — PROGRESS NOTES
Sara Bhardwaj is a 47year old female [id number] Patient's last menstrual period was 04/01/2014. here for annual exam.       Last seen 12/31/19 for colpo-- REDD 1. No gyne issues.   Last mammogram 10/2019 normal.    Osteoporosis much improved with proli MG Oral Tab Take 1 tablet (25 mg total) by mouth daily. 90 tablet 2   • Pimecrolimus 1 % External Cream External application as directed at bedtime for 2-week 30 g 2   • valsartan 80 MG Oral Tab Take 0.5 tablets (40 mg total) by mouth daily.  45 tablet 2 prolapse  Bladder:  No fullness, masses or tenderness  Vagina:  Normal appearance without lesions, no abnormal discharge  Cervix:  Normal without tenderness on motion  Uterus: normal in size, contour, position, mobility, without tenderness  Adnexa: normal

## 2019-12-10 ENCOUNTER — PATIENT MESSAGE (OUTPATIENT)
Dept: INTERNAL MEDICINE CLINIC | Facility: CLINIC | Age: 54
End: 2019-12-10

## 2019-12-10 DIAGNOSIS — M79.672 CHRONIC PAIN OF BOTH FEET: Primary | ICD-10-CM

## 2019-12-10 DIAGNOSIS — G89.29 CHRONIC PAIN OF BOTH FEET: Primary | ICD-10-CM

## 2019-12-10 DIAGNOSIS — M79.671 CHRONIC PAIN OF BOTH FEET: Primary | ICD-10-CM

## 2019-12-10 RX ORDER — VALSARTAN 80 MG/1
TABLET ORAL
Qty: 45 TABLET | Refills: 1 | Status: SHIPPED | OUTPATIENT
Start: 2019-12-10 | End: 2020-02-21

## 2019-12-10 NOTE — TELEPHONE ENCOUNTER
Refill passed per [facility], [facility] protocol.   Hypertensive Medications  Protocol Criteria:  · Appointment scheduled in the past 6 months or in the next 3 months  · BMP or CMP in the past 12 months  · Creatinine result < 2  Recent Outpatient Visits

## 2019-12-11 NOTE — TELEPHONE ENCOUNTER
From: Dante Yanez  To: Alon Garcia MD  Sent: 12/10/2019 10:48 PM CST  Subject: Non-Urgent Medical Question    I need to revisit my feet issues. What podiatrist do you recommend?

## 2019-12-13 ENCOUNTER — TELEPHONE (OUTPATIENT)
Dept: INTERNAL MEDICINE CLINIC | Facility: CLINIC | Age: 54
End: 2019-12-13

## 2019-12-13 RX ORDER — VALSARTAN 320 MG/1
320 TABLET ORAL DAILY
Qty: 90 TABLET | Refills: 1 | Status: SHIPPED | OUTPATIENT
Start: 2019-12-13 | End: 2020-02-21

## 2019-12-13 NOTE — TELEPHONE ENCOUNTER
Spoke with patient (verified name and ), verify BP medication, states that she is taking Valsartan 80 mg (half tablet=40 mg ) daily.      Called [facility] and spoke with Marck Box, pharmacist, per our record on TE Medication 19 -Olmesartan was on peri

## 2019-12-13 NOTE — TELEPHONE ENCOUNTER
Dr Baker= see your note below, do you want to change the Valsartan to 320 mg? Thanks.     Please reply to pool: EM RN TRIAGE      April 17, 2019   Sakina Antunez MD   to [facility] Rn Triage        9:23 PM   Note      Valsartan 80 mg half a tablet daily–90 t

## 2019-12-13 NOTE — TELEPHONE ENCOUNTER
Require further clarification for the following: We received e-rx on 12/10/19 for Valsartan tab 80MG \"Plain\". Patient has a history of Olmesartan medoxomil tab 40mg plain, filled 12/10/19.   Does Valsartan tab 80mg plain replace Olmesartan medoxomil ta

## 2019-12-15 ENCOUNTER — PATIENT MESSAGE (OUTPATIENT)
Dept: INTERNAL MEDICINE CLINIC | Facility: CLINIC | Age: 54
End: 2019-12-15

## 2019-12-17 NOTE — TELEPHONE ENCOUNTER
From: Gunnar Yanez  To: Asiya Craven MD  Sent: 12/15/2019 12:22 PM CST  Subject: Prescription Question    I talked to the nurse on Friday about my Vasartan prescription and confirmed I take 40 mg per day (1/2 of 80mg).  For some reason two prescript

## 2019-12-23 ENCOUNTER — HOSPITAL ENCOUNTER (OUTPATIENT)
Dept: GENERAL RADIOLOGY | Age: 54
Discharge: HOME | End: 2019-12-23
Attending: PODIATRIST
Payer: COMMERCIAL

## 2019-12-23 ENCOUNTER — OFFICE VISIT (OUTPATIENT)
Dept: PODIATRY CLINIC | Facility: CLINIC | Age: 54
End: 2019-12-23
Payer: COMMERCIAL

## 2019-12-23 ENCOUNTER — NURSE ONLY (OUTPATIENT)
Dept: ENDOCRINOLOGY CLINIC | Facility: CLINIC | Age: 54
End: 2019-12-23
Payer: COMMERCIAL

## 2019-12-23 DIAGNOSIS — M20.5X2 HALLUX LIMITUS OF LEFT FOOT: Primary | ICD-10-CM

## 2019-12-23 DIAGNOSIS — M81.0 AGE-RELATED OSTEOPOROSIS WITHOUT CURRENT PATHOLOGICAL FRACTURE: Primary | ICD-10-CM

## 2019-12-23 DIAGNOSIS — M20.5X1 HALLUX LIMITUS OF RIGHT FOOT: ICD-10-CM

## 2019-12-23 DIAGNOSIS — M20.5X2 HALLUX LIMITUS OF LEFT FOOT: ICD-10-CM

## 2019-12-23 PROCEDURE — 73630 X-RAY EXAM OF FOOT: CPT | Performed by: PODIATRIST

## 2019-12-23 PROCEDURE — 99203 OFFICE O/P NEW LOW 30 MIN: CPT | Performed by: PODIATRIST

## 2019-12-23 PROCEDURE — 96372 THER/PROPH/DIAG INJ SC/IM: CPT | Performed by: INTERNAL MEDICINE

## 2019-12-23 NOTE — PROGRESS NOTES
Patient presents to clinic today for Prolia (denosumab) 60 mg/mL in right upper arm. Patient tolerated well. Today is Prolia #10. Per last office visit notes, will plan drug holiday after tenth injection.  Recommended that patient schedule follow up in 6 mo

## 2019-12-30 ENCOUNTER — TELEPHONE (OUTPATIENT)
Dept: PODIATRY CLINIC | Facility: CLINIC | Age: 54
End: 2019-12-30

## 2019-12-30 ENCOUNTER — PATIENT MESSAGE (OUTPATIENT)
Dept: INTERNAL MEDICINE CLINIC | Facility: CLINIC | Age: 54
End: 2019-12-30

## 2019-12-30 RX ORDER — METOPROLOL SUCCINATE 25 MG/1
25 TABLET, EXTENDED RELEASE ORAL DAILY
Qty: 90 TABLET | Refills: 1 | Status: SHIPPED | OUTPATIENT
Start: 2019-12-30 | End: 2019-12-31

## 2019-12-30 NOTE — TELEPHONE ENCOUNTER
From: Johnna Yanez  To: Lorraine Alonzo MD  Sent: 12/30/2019 9:37 AM CST  Subject: Prescription Question    I talked to a nurse on 12/18 and requested a refill of Metoprolol. I just checked my [employer] account and it was never placed.  I'm down to 10 pill

## 2019-12-30 NOTE — TELEPHONE ENCOUNTER
Refill passed per [facility], [facility] protocol.   Hypertensive Medications  Protocol Criteria:  · Appointment scheduled in the past 6 months or in the next 3 months  · BMP or CMP in the past 12 months  · Creatinine result < 2  Recent Outpatient Visits

## 2019-12-31 RX ORDER — METOPROLOL SUCCINATE 25 MG/1
TABLET, EXTENDED RELEASE ORAL
Qty: 90 TABLET | Refills: 1 | Status: SHIPPED | OUTPATIENT
Start: 2019-12-31 | End: 2020-05-26

## 2019-12-31 NOTE — TELEPHONE ENCOUNTER
Refill passed per [facility], [facility] protocol.   Hypertensive Medications  Protocol Criteria:  · Appointment scheduled in the past 6 months or in the next 3 months  · BMP or CMP in the past 12 months  · Creatinine result < 2  Recent Outpatient Visits

## 2020-01-08 ENCOUNTER — PATIENT MESSAGE (OUTPATIENT)
Dept: PODIATRY CLINIC | Facility: CLINIC | Age: 55
End: 2020-01-08

## 2020-01-22 NOTE — TELEPHONE ENCOUNTER
Spoke to pt. Informed her that I do have an opening on 1/29 if she would like to move her surgery date up. She informed me that she rec'd a second opinion from another doctor and is thinking about canceling sx date.   She will think about it over and will

## 2020-02-17 ENCOUNTER — LAB ENCOUNTER (OUTPATIENT)
Dept: LAB | Age: 55
End: 2020-02-17
Attending: INTERNAL MEDICINE
Payer: COMMERCIAL

## 2020-02-17 DIAGNOSIS — Z01.818 PREOP EXAMINATION: Primary | ICD-10-CM

## 2020-02-17 PROCEDURE — 93010 ELECTROCARDIOGRAM REPORT: CPT | Performed by: INTERNAL MEDICINE

## 2020-02-17 PROCEDURE — 93005 ELECTROCARDIOGRAM TRACING: CPT

## 2020-02-18 ENCOUNTER — TELEPHONE (OUTPATIENT)
Dept: INTERNAL MEDICINE CLINIC | Facility: CLINIC | Age: 55
End: 2020-02-18

## 2020-02-18 NOTE — TELEPHONE ENCOUNTER
Charity Montelongo from Dr. Chris Ramachandran stated they need the physical/pre-op results faxed to them by Feb. 25th. Fax number [phone]    Thank you.

## 2020-02-18 NOTE — TELEPHONE ENCOUNTER
Aftab/ Medical Assistant of Dr. Cecilia Schmidt office is requesting patient's history, physical, and blood work. Gina Darleen states patient is scheduled for Left Heel surgery 02/27/2020.     Fax# [phone] attention to Fish Castillo (Surgery Scheduling)

## 2020-02-18 NOTE — TELEPHONE ENCOUNTER
I talked to Carito Ramachandran since Neida Pritchett was not available and said pt. Does not have a pre-op PX until the 21st.  After that we will fax it over.

## 2020-02-21 ENCOUNTER — PATIENT MESSAGE (OUTPATIENT)
Dept: ENDOCRINOLOGY CLINIC | Facility: CLINIC | Age: 55
End: 2020-02-21

## 2020-02-21 ENCOUNTER — OFFICE VISIT (OUTPATIENT)
Dept: INTERNAL MEDICINE CLINIC | Facility: CLINIC | Age: 55
End: 2020-02-21
Payer: COMMERCIAL

## 2020-02-21 VITALS
RESPIRATION RATE: 18 BRPM | BODY MASS INDEX: 37.56 KG/M2 | DIASTOLIC BLOOD PRESSURE: 78 MMHG | HEART RATE: 85 BPM | SYSTOLIC BLOOD PRESSURE: 117 MMHG | WEIGHT: 212 LBS | HEIGHT: 63 IN

## 2020-02-21 DIAGNOSIS — E21.3 HYPERPARATHYROIDISM (HCC): ICD-10-CM

## 2020-02-21 DIAGNOSIS — M81.0 AGE-RELATED OSTEOPOROSIS WITHOUT CURRENT PATHOLOGICAL FRACTURE: Primary | ICD-10-CM

## 2020-02-21 DIAGNOSIS — Z00.00 ROUTINE PHYSICAL EXAMINATION: ICD-10-CM

## 2020-02-21 DIAGNOSIS — K44.9 HIATAL HERNIA: ICD-10-CM

## 2020-02-21 DIAGNOSIS — I10 ESSENTIAL HYPERTENSION: ICD-10-CM

## 2020-02-21 DIAGNOSIS — K76.89 FOCAL NODULAR HYPERPLASIA OF LIVER: ICD-10-CM

## 2020-02-21 DIAGNOSIS — E78.49 OTHER HYPERLIPIDEMIA: ICD-10-CM

## 2020-02-21 PROCEDURE — 99396 PREV VISIT EST AGE 40-64: CPT | Performed by: INTERNAL MEDICINE

## 2020-02-21 RX ORDER — OLMESARTAN MEDOXOMIL 40 MG/1
TABLET ORAL
COMMUNITY
Start: 2019-12-10 | End: 2020-03-12

## 2020-02-21 NOTE — PATIENT INSTRUCTIONS
Problem List Items Addressed This Visit        Unprioritized    Essential hypertension     Blood pressure 117/78, pulse 85, resp. rate 18, height 5' 3\" (1.6 m), weight 212 lb (96.2 kg), last menstrual period 04/01/2014, not currently breastfeeding.   Jayro normal,no palpable abnormalities. Hemorrhoids-small internal present. Brown stools,guaic negetive  Pelvic exam completed–no cervical movement tenderness, adnexal palpable abnormalities. No cystocele, rectocele or uterine descent.   Pap Smear,3 Years due

## 2020-02-21 NOTE — ASSESSMENT & PLAN NOTE
Lipid panel and liver function tests look stable on rosuvastatin 5 mg daily. Continue on the same dose of medication. Strict diet controlled to restrict fatty foods and fried foods as discussed.

## 2020-02-21 NOTE — ASSESSMENT & PLAN NOTE
Parathyroid levels were elevated due to low vitamin D levels. Now seems much improved. Monitored per endocrinology.

## 2020-02-21 NOTE — PROGRESS NOTES
REASON FOR VISIT:    Riddhi Weston is a 49year old female who presents for an [address]     Immunization History   Administered Date(s) Administered   • FLULAVAL 6 months & older 0.5 ml Prefilled syringe (75653) 11/30/2018, 12/06/2019 hyperlipidemia     Hyperparathyroidism ([address]      General Health                                   Domestic Abuse: No     CAGE:     Cut : No    Annoyed : No    Guilty : No    Eye Opener : No    Scoring  Total Score: 0     Depression Screening (PHQ-2/PHQ-9) PPDINDURAT      Disease Monitoring:    SPECIFIC DISEASE MONITORING Internal Lab or Procedure External Lab or Procedure   Annual Monitoring of Persistent     Medications (ACE/ARB, digoxin, diuretics)    Potassium  Annually POTASSIUM (mmol/L)   Date Value radiculopathy    • Obesity, unspecified    • Other and unspecified hyperlipidemia    • Unspecified essential hypertension       Past Surgical History:   Procedure Laterality Date   • COLONOSCOPY  2012   • JOSE  2004      Family History   Problem Relation A without murmur  GI: good BS's, no masses, HSM or tenderness  Genitourinary:    External Gyn:  Pubic hair is normally distributed.  External genitalia is unremarkable. Glands do appear to be normal.  Perineum is unremarkable.  No perianal abnormalities.    U pantoprazole. No dysphagia, abdominal pain, nausea or vomiting. Hyperparathyroidism ([address]     Parathyroid levels were elevated due to low vitamin D levels. Now seems much improved. Monitored per endocrinology.          Osteoporosis - Primary Future    Hiatal hernia    Routine physical examination       The patient indicates understanding of these issues and agrees to the plan. Return in about 6 months (around 8/21/2020).     Diet counseling perfomed  Exercise counseling perfomed    SUGGESTED V

## 2020-02-21 NOTE — ASSESSMENT & PLAN NOTE
Blood pressure 117/78, pulse 85, resp. rate 18, height 5' 3\" (1.6 m), weight 212 lb (96.2 kg), last menstrual period 04/01/2014, not currently breastfeeding.   Stable blood pressure, well controlled at this time on olmesartan 40 mg daily, metoprolol 25 mg

## 2020-02-21 NOTE — PROGRESS NOTES
Kori Chester is a 49year old female who presents for a pre-operative physical exam.   Kori Chester is scheduled for a foot surgery procedure at [facility] performed by Dr Parker Sullivan  Indication: foot pain    HPI related to surge Psychiatric/Behavioral: Negative. PHYSICAL EXAM:   /78   Pulse 85   Resp 18   Ht 5' 3\" (1.6 m)   Wt 212 lb (96.2 kg)   LMP 04/01/2014   BMI 37.55 kg/m²    Physical Exam   Constitutional: She is oriented to person, place, and time.  She ap

## 2020-02-21 NOTE — ASSESSMENT & PLAN NOTE
History of focal nodular hyperplasia, stable with mild changes noted on last MRI. Follow-up MRI due in May 2020. Orders provided.

## 2020-02-21 NOTE — ASSESSMENT & PLAN NOTE
Normal exam.  Labs as ordered. Skin check normal.  No significant abnormal nevi. Breast exam completed–no palpable abnormalities, discharge from the nipples or axillary adenopathy. Mammogram due on 10/18/2020  dexa scan due in 2021.   No cervical or ingu

## 2020-02-21 NOTE — TELEPHONE ENCOUNTER
LOV: 6/14/19    Do you want yearly fu and repeat pth and alk phos before next visit?   Recent     Component      Latest Ref Rng & Units 2/19/2020   CALCIUM      8.6 - 10.4 mg/dL 9.6     Component      Latest Ref Rng & Units 12/23/2019   VITAMIN D, 25-OH, TO

## 2020-02-21 NOTE — ASSESSMENT & PLAN NOTE
Asymptomatic large hiatal hernia. Monitored per gastroenterology. Stable on pantoprazole. No dysphagia, abdominal pain, nausea or vomiting.

## 2020-02-24 ENCOUNTER — PATIENT MESSAGE (OUTPATIENT)
Dept: ENDOCRINOLOGY CLINIC | Facility: CLINIC | Age: 55
End: 2020-02-24

## 2020-02-24 DIAGNOSIS — M81.8 OTHER OSTEOPOROSIS, UNSPECIFIED PATHOLOGICAL FRACTURE PRESENCE: Primary | ICD-10-CM

## 2020-02-24 NOTE — TELEPHONE ENCOUNTER
Sorry I wasn't clear - yes she did get her last injection but she will need to be transitioned off medication. Schedule visit in June to discuss that plan and recheck labs before visit. Thanks.

## 2020-02-24 NOTE — TELEPHONE ENCOUNTER
Replied to pt via [address] [place] [address]. Sent staff message for reminder in May for Prolia for June 2020.

## 2020-02-24 NOTE — TELEPHONE ENCOUNTER
From: Sara Bhardwaj  To: Sofia Saeed MD  Sent: 2/24/2020 1:42 PM CST  Subject: Visit Follow-up Question    My question wasn't adequately answered and it's closed for me to send a reply:     The last time I saw Dr Lucina Jarquin she said I didn't need to gabriele

## 2020-02-24 NOTE — TELEPHONE ENCOUNTER
Please advise - just Prolia labs and follow up with you in June 2020? Patient cleared up and states her last Prolia injection was December 2020, meaning you and patient had discussed her stopping and looking for alternatives? Would like clarification.  Than

## 2020-02-25 ENCOUNTER — TELEPHONE (OUTPATIENT)
Dept: INTERNAL MEDICINE CLINIC | Facility: CLINIC | Age: 55
End: 2020-02-25

## 2020-02-25 NOTE — TELEPHONE ENCOUNTER
Elaina from [facility] Shayan Oro is requesting for patient's history and physical notes. Patient is scheduled for foot surgery on 2/27. Please fax them to #[phone]. Thank you.

## 2020-02-28 ENCOUNTER — PATIENT MESSAGE (OUTPATIENT)
Dept: INTERNAL MEDICINE CLINIC | Facility: CLINIC | Age: 55
End: 2020-02-28

## 2020-02-29 NOTE — TELEPHONE ENCOUNTER
From: Eleanor Yanez  To: Joshua Herman MD  Sent: 2/28/2020 7:24 PM CST  Subject: Prescription Question    When I was in on 2/21, Dr Wojciech Shields said she'd send one year of refills for my prescriptions. [employer] hasn't received them.  I can reque

## 2020-03-02 RX ORDER — ROSUVASTATIN CALCIUM 5 MG/1
TABLET, COATED ORAL
Qty: 90 TABLET | Refills: 1 | Status: SHIPPED | OUTPATIENT
Start: 2020-03-02 | End: 2020-08-03

## 2020-03-02 RX ORDER — HYDROCHLOROTHIAZIDE 25 MG/1
25 TABLET ORAL DAILY
Qty: 90 TABLET | Refills: 1 | Status: SHIPPED | OUTPATIENT
Start: 2020-03-02 | End: 2020-08-03

## 2020-03-02 RX ORDER — PANTOPRAZOLE SODIUM 40 MG/1
TABLET, DELAYED RELEASE ORAL
Qty: 90 TABLET | Refills: 1 | Status: SHIPPED | OUTPATIENT
Start: 2020-03-02 | End: 2020-10-12

## 2020-03-12 ENCOUNTER — PATIENT MESSAGE (OUTPATIENT)
Dept: INTERNAL MEDICINE CLINIC | Facility: CLINIC | Age: 55
End: 2020-03-12

## 2020-03-12 RX ORDER — OLMESARTAN MEDOXOMIL 40 MG/1
20 TABLET ORAL DAILY
Qty: 90 TABLET | Refills: 1 | Status: SHIPPED | OUTPATIENT
Start: 2020-03-12 | End: 2020-10-22

## 2020-03-12 NOTE — TELEPHONE ENCOUNTER
Please advise. 2/1/19 OV indicate Olmesartan 20 mg added for pt    2/5/19 indicates insurance would not cover 20 mg tablets and med was changed to Olmesartan 40 mg and pt to take 1/2 tab.

## 2020-03-12 NOTE — TELEPHONE ENCOUNTER
From: Burgess Alexy Yanez  To: Deborah Hollis MD  Sent: 3/12/2020 2:26 PM CDT  Subject: Prescription Question    My Olmesartan hasn't been sent to [facility] for a refill yet.  This is the prescription that was replaced at one point last year for Valsartan but I

## 2020-03-13 RX ORDER — METOPROLOL SUCCINATE 25 MG/1
TABLET, EXTENDED RELEASE ORAL
Qty: 90 TABLET | Refills: 3 | OUTPATIENT
Start: 2020-03-13

## 2020-03-23 ENCOUNTER — TELEPHONE (OUTPATIENT)
Dept: INTERNAL MEDICINE CLINIC | Facility: CLINIC | Age: 55
End: 2020-03-23

## 2020-03-23 NOTE — TELEPHONE ENCOUNTER
Hello,    Patient's health plan has denied prior authorization request for MRI. Per health plan clinical does not meet medical guidelines. Please contact patient for re-direction of care. Please see detail denial letter below.     Thank you, Esvin Caldwell

## 2020-03-23 NOTE — TELEPHONE ENCOUNTER
These let patient know that her request for the MRI was denied. She will need to contact gastroenterology, see Dr. Sheyla Warner have this completed after evaluation. e

## 2020-04-13 ENCOUNTER — PATIENT MESSAGE (OUTPATIENT)
Dept: ENDOCRINOLOGY CLINIC | Facility: CLINIC | Age: 55
End: 2020-04-13

## 2020-04-13 RX ORDER — ERGOCALCIFEROL 1.25 MG/1
50000 CAPSULE ORAL WEEKLY
Qty: 12 CAPSULE | Refills: 0 | Status: SHIPPED | OUTPATIENT
Start: 2020-04-13 | End: 2020-04-13

## 2020-04-13 NOTE — TELEPHONE ENCOUNTER
Dr. Gasper Crocker,     Please see below message. Last level of vitamin D was 76 on 12/23/19. You decreased her dose to 50,000 units weekly from 100,000 units. Please see Refill encounter from today. Thank you.

## 2020-04-13 NOTE — TELEPHONE ENCOUNTER
LOV: 06/14/19 (RTC 6 months) - No future appointment. Per 02/24/20 mychart encounter. Patient was instructed to repeat labs before following up in June to discuss osteoporosis treatment.   Note states to continue vitamin D (level then was 60)  LR: 11/11/1

## 2020-04-13 NOTE — TELEPHONE ENCOUNTER
Dr. Charito Humphrey,     Patient responded and was agreeable to the change you suggested: 6 tablets per month. Pended 90 days supply for your review and approval. Thanks.

## 2020-04-13 NOTE — TELEPHONE ENCOUNTER
Noted. Patient was notified about dose change via Mychart as it appears she is actively using Mychart.

## 2020-04-13 NOTE — TELEPHONE ENCOUNTER
From: Roby Simmonds  To: Ever Delacruz MD  Sent: 4/13/2020 4:25 PM CDT  Subject: Prescription Question    I'm concerned about dropping. When I do my blood levels go down. If I was in the high range I can understand.  My last test was 61 and that was af

## 2020-04-15 RX ORDER — ERGOCALCIFEROL 1.25 MG/1
100000 CAPSULE ORAL WEEKLY
Qty: 18 CAPSULE | Refills: 0 | Status: SHIPPED | OUTPATIENT
Start: 2020-04-15 | End: 2020-08-04

## 2020-04-29 ENCOUNTER — PATIENT MESSAGE (OUTPATIENT)
Dept: GASTROENTEROLOGY | Facility: CLINIC | Age: 55
End: 2020-04-29

## 2020-04-29 NOTE — TELEPHONE ENCOUNTER
From: Ankita Sessions  To: Linh Shaffer MD  Sent: 4/29/2020 8:23 AM CDT  Subject: Other    I received a message that I missed my appointment Monday.  Dr. Siddharth Montez called me for a telehealth visit and I assume my insurance will be appropriately

## 2020-05-13 ENCOUNTER — PATIENT MESSAGE (OUTPATIENT)
Dept: ENDOCRINOLOGY CLINIC | Facility: CLINIC | Age: 55
End: 2020-05-13

## 2020-05-13 NOTE — TELEPHONE ENCOUNTER
From: Ruthann Estrada  To: Rachelle Worley MD  Sent: 5/13/2020 9:05 AM CDT  Subject: Other    I have an appointment on my calendar for Dr Sukhjindre Sparks for 6/26 at 7:45. I'm not seeing it in [facility]. Does that mean it didn't go through properly?  It looks like Aug

## 2020-05-15 ENCOUNTER — MED REC SCAN ONLY (OUTPATIENT)
Dept: INTERNAL MEDICINE CLINIC | Facility: CLINIC | Age: 55
End: 2020-05-15

## 2020-05-18 ENCOUNTER — TELEPHONE (OUTPATIENT)
Dept: ENDOCRINOLOGY CLINIC | Facility: CLINIC | Age: 55
End: 2020-05-18

## 2020-05-18 NOTE — TELEPHONE ENCOUNTER
Patient due for Prolia after 6/24/2020. Labs added per protocol. Patient has a virtual visit booked on 7/3/2020. Prolia IV pended for approval. Will await decision.

## 2020-05-23 ENCOUNTER — PATIENT MESSAGE (OUTPATIENT)
Dept: INTERNAL MEDICINE CLINIC | Facility: CLINIC | Age: 55
End: 2020-05-23

## 2020-05-23 ENCOUNTER — TELEPHONE (OUTPATIENT)
Dept: INTERNAL MEDICINE CLINIC | Facility: CLINIC | Age: 55
End: 2020-05-23

## 2020-05-23 NOTE — TELEPHONE ENCOUNTER
From: Estrella Yanez  To: Shaye Baker MD  Sent: 5/23/2020 11:31 AM CDT  Subject: Prescription Question    Attached is the letter. Doesn't give any indication of what was being requested.  I submitted a refill request today and it says they'll contact

## 2020-05-23 NOTE — TELEPHONE ENCOUNTER
If she has a copy of the letter that the center–please have her up loaded into the system. Both the valsartan and metoprolol are commonly used medications without any significant side effects and they are used together if needed.

## 2020-05-23 NOTE — TELEPHONE ENCOUNTER
----- Message from Millie Yanez sent at 5/23/2020 10:32 AM CDT -----  Regarding: Prescription Question  Contact: [phone]  Samantha Mac will be contacting you about the Metoprolol prescription refill.  They sent a letter in February or March also anamika

## 2020-05-23 NOTE — TELEPHONE ENCOUNTER
From: Bj Yanez  To: Miguelina Mas MD  Sent: 5/23/2020 10:32 AM CDT  Subject: Prescription Question    Norberto will be contacting you about the Metoprolol prescription refill.  They sent a letter in February or March also saying they were contacting

## 2020-05-26 RX ORDER — METOPROLOL SUCCINATE 25 MG/1
TABLET, EXTENDED RELEASE ORAL
Qty: 90 TABLET | Refills: 3 | Status: SHIPPED | OUTPATIENT
Start: 2020-05-26 | End: 2020-10-22

## 2020-05-26 NOTE — TELEPHONE ENCOUNTER
[facility] who states no prior authorization is needed. Patient informed.

## 2020-05-29 NOTE — TELEPHONE ENCOUNTER
Received SOB for prolia. PA needed. Pt is to owe 0% of cost. Completed PA and faxed to [facility]. Waiting response.

## 2020-06-09 ENCOUNTER — MED REC SCAN ONLY (OUTPATIENT)
Dept: INTERNAL MEDICINE CLINIC | Facility: CLINIC | Age: 55
End: 2020-06-09

## 2020-07-03 ENCOUNTER — TELEMEDICINE (OUTPATIENT)
Dept: ENDOCRINOLOGY CLINIC | Facility: CLINIC | Age: 55
End: 2020-07-03

## 2020-07-03 DIAGNOSIS — M81.8 OTHER OSTEOPOROSIS WITHOUT CURRENT PATHOLOGICAL FRACTURE: Primary | ICD-10-CM

## 2020-07-03 PROCEDURE — 99213 OFFICE O/P EST LOW 20 MIN: CPT | Performed by: INTERNAL MEDICINE

## 2020-07-03 RX ORDER — ZOLEDRONIC ACID 5 MG/100ML
5 INJECTION, SOLUTION INTRAVENOUS ONCE
Qty: 100 ML | Refills: 0 | Status: SHIPPED | OUTPATIENT
Start: 2020-07-03 | End: 2020-07-03

## 2020-07-03 NOTE — PROGRESS NOTES
Name: Landrum Merlin  Date: 7/3/2020    This visit is conducted using Telemedicine with live, interactive video and audio. HISTORY OF PRESENT ILLNESS   Landrum Merlin is a 49year old female who presents for No chief complaint on file.     48 Oral Tablet 24 Hr, TAKE 1 TABLET BY MOUTH DAILY, Disp: 90 tablet, Rfl: 3  •  ergocalciferol 1.25 MG (62460 UT) Oral Cap, Take 2 capsules (100,000 Units total) by mouth once a week., Disp: 18 capsule, Rfl: 0  •  Olmesartan Medoxomil 40 MG Oral Tab, Take 0.5 pupils  Throat/Neck: normal sound to voice. Back: no kyphosis  Respiratory:  non-labored. no increased work of breathing.     Lymph Nodes:  No abnormal nodes noted  Skin:  normal moisture and skin texture  Hematologic:  no excessive bruising  Psychiatric:

## 2020-07-06 RX ORDER — ZOLEDRONIC ACID 5 MG/100ML
5 INJECTION, SOLUTION INTRAVENOUS ONCE
Status: CANCELLED | OUTPATIENT
Start: 2020-07-06

## 2020-07-07 ENCOUNTER — TELEPHONE (OUTPATIENT)
Dept: HEMATOLOGY/ONCOLOGY | Facility: HOSPITAL | Age: 55
End: 2020-07-07

## 2020-07-07 NOTE — TELEPHONE ENCOUNTER
I spoke with patient and let her know we received her order for reclast but she needs to complete her lab first. The patient said she goes to [facility] for labs. She asked me to mail the order so she can go. I put the order as requested the mail today.  I let t

## 2020-07-24 LAB
BUN: 19 MG/DL (ref 7–25)
CALCIUM: 9.6 MG/DL (ref 8.6–10.4)
CARBON DIOXIDE: 28 MMOL/L (ref 20–32)
CHLORIDE: 101 MMOL/L (ref 98–110)
CREATININE: 0.78 MG/DL (ref 0.5–1.05)
EGFR IF AFRICN AM: 101 ML/MIN/1.73M2
EGFR IF NONAFRICN AM: 87 ML/MIN/1.73M2
GLUCOSE: 89 MG/DL (ref 65–99)
POTASSIUM: 4 MMOL/L (ref 3.5–5.3)
SODIUM: 139 MMOL/L (ref 135–146)

## 2020-08-03 ENCOUNTER — OFFICE VISIT (OUTPATIENT)
Dept: HEMATOLOGY/ONCOLOGY | Facility: HOSPITAL | Age: 55
End: 2020-08-03
Attending: INTERNAL MEDICINE
Payer: COMMERCIAL

## 2020-08-03 VITALS
RESPIRATION RATE: 16 BRPM | HEART RATE: 88 BPM | SYSTOLIC BLOOD PRESSURE: 144 MMHG | TEMPERATURE: 97 F | DIASTOLIC BLOOD PRESSURE: 90 MMHG | OXYGEN SATURATION: 99 %

## 2020-08-03 DIAGNOSIS — M81.0 AGE-RELATED OSTEOPOROSIS WITHOUT CURRENT PATHOLOGICAL FRACTURE: Primary | ICD-10-CM

## 2020-08-03 PROCEDURE — 96365 THER/PROPH/DIAG IV INF INIT: CPT

## 2020-08-03 RX ORDER — HYDROCHLOROTHIAZIDE 25 MG/1
TABLET ORAL
Qty: 90 TABLET | Refills: 1 | Status: SHIPPED | OUTPATIENT
Start: 2020-08-03 | End: 2020-12-19

## 2020-08-03 RX ORDER — ZOLEDRONIC ACID 5 MG/100ML
5 INJECTION, SOLUTION INTRAVENOUS ONCE
Status: COMPLETED | OUTPATIENT
Start: 2020-08-03 | End: 2020-08-03

## 2020-08-03 RX ORDER — ZOLEDRONIC ACID 5 MG/100ML
5 INJECTION, SOLUTION INTRAVENOUS ONCE
Status: CANCELLED | OUTPATIENT
Start: 2020-08-03

## 2020-08-03 RX ORDER — ZOLEDRONIC ACID 5 MG/100ML
INJECTION, SOLUTION INTRAVENOUS
Status: COMPLETED
Start: 2020-08-03 | End: 2020-08-03

## 2020-08-03 RX ORDER — ROSUVASTATIN CALCIUM 5 MG/1
TABLET, COATED ORAL
Qty: 90 TABLET | Refills: 1 | Status: SHIPPED | OUTPATIENT
Start: 2020-08-03 | End: 2020-10-22

## 2020-08-03 RX ADMIN — ZOLEDRONIC ACID 5 MG: 5 INJECTION, SOLUTION INTRAVENOUS at 14:18:00

## 2020-08-03 NOTE — PROGRESS NOTES
Emily Davy presents for her first reclast infusion. Has had prolia in the past. Current lab results appropriate for treatment. crcl   126, calcium 9.6 creatinine 0.78  Krames info printed and given, no dental concerns. Potential side effects discussed.  All qu

## 2020-08-04 ENCOUNTER — NURSE TRIAGE (OUTPATIENT)
Dept: INTERNAL MEDICINE CLINIC | Facility: CLINIC | Age: 55
End: 2020-08-04

## 2020-08-04 ENCOUNTER — TELEMEDICINE (OUTPATIENT)
Dept: INTERNAL MEDICINE CLINIC | Facility: CLINIC | Age: 55
End: 2020-08-04

## 2020-08-04 ENCOUNTER — LAB ENCOUNTER (OUTPATIENT)
Dept: LAB | Facility: HOSPITAL | Age: 55
End: 2020-08-04
Attending: NURSE PRACTITIONER
Payer: COMMERCIAL

## 2020-08-04 VITALS — TEMPERATURE: 103 F | DIASTOLIC BLOOD PRESSURE: 103 MMHG | SYSTOLIC BLOOD PRESSURE: 155 MMHG

## 2020-08-04 DIAGNOSIS — B34.9 VIRAL SYNDROME: Primary | ICD-10-CM

## 2020-08-04 DIAGNOSIS — B34.9 VIRAL SYNDROME: ICD-10-CM

## 2020-08-04 DIAGNOSIS — B34.9 ACUTE VIRAL SYNDROME: ICD-10-CM

## 2020-08-04 PROCEDURE — 99213 OFFICE O/P EST LOW 20 MIN: CPT | Performed by: NURSE PRACTITIONER

## 2020-08-04 PROCEDURE — 3077F SYST BP >= 140 MM HG: CPT | Performed by: NURSE PRACTITIONER

## 2020-08-04 PROCEDURE — 3080F DIAST BP >= 90 MM HG: CPT | Performed by: NURSE PRACTITIONER

## 2020-08-04 RX ORDER — AZITHROMYCIN 250 MG/1
TABLET, FILM COATED ORAL
Qty: 6 TABLET | Refills: 0 | Status: SHIPPED | OUTPATIENT
Start: 2020-08-04 | End: 2020-08-09

## 2020-08-04 RX ORDER — ERGOCALCIFEROL 1.25 MG/1
CAPSULE ORAL
Qty: 24 CAPSULE | Refills: 1 | Status: SHIPPED | OUTPATIENT
Start: 2020-08-04 | End: 2020-12-21

## 2020-08-04 NOTE — TELEPHONE ENCOUNTER
Action Requested: Summary for Provider     []  Critical Lab, Recommendations Needed  [] Need Additional Advice  []   FYI    []   Need Orders  [] Need Medications Sent to Pharmacy  []  Other     SUMMARY: Patient calling with Covid-like symptoms which she de

## 2020-08-04 NOTE — PROGRESS NOTES
HPI:    Patient ID: Kori Chester is a 49year old female. Please note that the following visit was completed using two-way, real-time interactive audio and/or video communication.   This has been done in good sukumar to provide continuity of care in Positive for shortness of breath. Negative for cough and wheezing. Cardiovascular: Negative for chest pain, palpitations and leg swelling. Gastrointestinal: Negative for nausea, vomiting, abdominal pain, diarrhea and constipation.    Endocrine: Bartolo Velez a normal mood and affect.  Her behavior is normal. Judgment and thought content normal.     BP (!) 155/103   Temp (!) 103.3 °F (39.6 °C)   LMP 04/01/2014   Wt Readings from Last 2 Encounters:  02/21/20 : 212 lb (96.2 kg)  12/06/19 : 221 lb 6.4 oz (100.4 kg)

## 2020-08-04 NOTE — ASSESSMENT & PLAN NOTE
A/P-49year-old female who went to the cancer center to get IV Reclast for her osteoporosis. The next day she developed viral syndrome. She has chills, fever, shortness of breath, and headache. Her fever is up to 103.   Patient thinks she has COVID and wo

## 2020-08-06 DIAGNOSIS — Z00.00 ROUTINE GENERAL MEDICAL EXAMINATION AT A HEALTH CARE FACILITY: ICD-10-CM

## 2020-08-06 LAB — SARS-COV-2 RNA RESP QL NAA+PROBE: NOT DETECTED

## 2020-08-06 RX ORDER — SUMATRIPTAN 50 MG/1
TABLET, FILM COATED ORAL
Qty: 9 TABLET | Refills: 3 | Status: SHIPPED | OUTPATIENT
Start: 2020-08-06

## 2020-08-07 ENCOUNTER — PATIENT MESSAGE (OUTPATIENT)
Dept: ENDOCRINOLOGY CLINIC | Facility: CLINIC | Age: 55
End: 2020-08-07

## 2020-08-10 NOTE — TELEPHONE ENCOUNTER
From: Liset Garay  To: Renee Garcia MD  Sent: 8/7/2020 9:04 AM CDT  Subject: Visit Follow-up Question    I had the Reclast treatment Monday afternoon.  I expected some side effects similar to Prolia, but what I experienced sent me down the COVID rab

## 2020-08-10 NOTE — TELEPHONE ENCOUNTER
Dr. Emile Miller, Juwanmohamud Arevalo. Patient's COVID test was negative. Per [facility] message with APN, patient is feeling better now.

## 2020-08-11 ENCOUNTER — TELEPHONE (OUTPATIENT)
Dept: ENDOCRINOLOGY CLINIC | Facility: CLINIC | Age: 55
End: 2020-08-11

## 2020-08-11 NOTE — TELEPHONE ENCOUNTER
Spoke with Ruslan Mccray:    She is addressing encounter from 8/7/20. Will address this acute issue in other encounter.

## 2020-08-11 NOTE — TELEPHONE ENCOUNTER
Pt states she is having terrible pain in her knee she had reclast last week. Wants to know if it is a side affect.  Please advise

## 2020-08-11 NOTE — TELEPHONE ENCOUNTER
Spoke with Chelsi Ventura:    She is following up on a [employer] message she sent yesterday, 8/10. In addition to the symptoms she was experiencing after her Reclast infusion, she has been having frequent migraines.  Patient states she took Imitrex for treatment

## 2020-08-11 NOTE — TELEPHONE ENCOUNTER
Spoke with Ruslan Mccray:    Discussed Dr. Elena Grimaldo recommendations regarding patient symptoms. Patient states she already took 3 ibuprofen this morning, Tylenol does not work for her. She will monitor her symptoms and call the office if they worsen.

## 2020-08-11 NOTE — TELEPHONE ENCOUNTER
Not sure if this is entirely reclast related, but reclast can cause myalgias . Typically they resolve in a few days.    I suggest taking OTC pain medication like tylenol for pain relief and monitor symptoms  To call if they get worse  Dr. Sarwat Peralta can address

## 2020-08-12 ENCOUNTER — TELEPHONE (OUTPATIENT)
Dept: INTERNAL MEDICINE CLINIC | Facility: CLINIC | Age: 55
End: 2020-08-12

## 2020-08-12 ENCOUNTER — APPOINTMENT (OUTPATIENT)
Dept: ULTRASOUND IMAGING | Facility: HOSPITAL | Age: 55
End: 2020-08-12
Attending: EMERGENCY MEDICINE
Payer: COMMERCIAL

## 2020-08-12 ENCOUNTER — HOSPITAL ENCOUNTER (EMERGENCY)
Facility: HOSPITAL | Age: 55
Discharge: HOME | End: 2020-08-12
Attending: EMERGENCY MEDICINE
Payer: COMMERCIAL

## 2020-08-12 VITALS
HEIGHT: 63 IN | BODY MASS INDEX: 38.45 KG/M2 | DIASTOLIC BLOOD PRESSURE: 95 MMHG | HEART RATE: 98 BPM | RESPIRATION RATE: 20 BRPM | WEIGHT: 217 LBS | SYSTOLIC BLOOD PRESSURE: 144 MMHG | TEMPERATURE: 98 F

## 2020-08-12 DIAGNOSIS — S86.811A STRAIN OF RIGHT CALF MUSCLE: Primary | ICD-10-CM

## 2020-08-12 PROCEDURE — 93971 EXTREMITY STUDY: CPT | Performed by: EMERGENCY MEDICINE

## 2020-08-12 PROCEDURE — 99284 EMERGENCY DEPT VISIT MOD MDM: CPT

## 2020-08-12 NOTE — TELEPHONE ENCOUNTER
Low-grade temp with right leg swelling, calf pain– suspicious for possible blood clot especially if she sits for long hours to work. The rest of the symptom complex may also be related to a reaction to the Reclast–but not sure.   I would like her to take s

## 2020-08-12 NOTE — TELEPHONE ENCOUNTER
Ok, noted. Please follow up with patient this morning. I would recommend continued ibuprofen every 8 hours to control myalgias. Thanks.

## 2020-08-12 NOTE — TELEPHONE ENCOUNTER
Discussed with patient over the phone. She continues to have persistent pain in knees, hips and to a less extent in lower back. She has also persistently had a temperature around 99 despite tylenol and ibuprofen around the clock.   Discussed that myalgias

## 2020-08-12 NOTE — ED PROVIDER NOTES
Patient Seen in: [facility] AND [facility] Emergency Department      History   Patient presents with:  Deep Vein Thrombosis    Stated Complaint: fever    HPI    23-year-old female with past medical history significant for obesity, high blood pressure, high cami kg/m²         Physical Exam    Physical Exam   Constitutional: AAOx3, well nourished, NAD  Head: Normocephalic and atraumatic.    Ears: TM's clear b/l  Nose: Nose normal.   Mouth/Throat: MMM, post OP clear with no exudates  Eyes: Conjunctivae and EOM are no

## 2020-08-12 NOTE — TELEPHONE ENCOUNTER
Pt seen in the ER  Clinical Impressions         Strain of right calf muscle   Disposition         Discharge         ED/IC AVS (Printed 8/12/2020)         Follow-Ups: Call Amanda Thompson MD (Internal Medicine) in 2 days (8/14/2020

## 2020-08-12 NOTE — TELEPHONE ENCOUNTER
Verified pt name and . Reviewed doctor's recommendations as noted below.  Pt agreed with recommendations, states she will go to [facility].

## 2020-08-12 NOTE — TELEPHONE ENCOUNTER
RN Please call pt. Thanks      ----- Message from Millie Yanez sent at 8/12/2020  8:53 AM CDT -----  Regarding: Non-Urgent Medical Question  Contact: [phone]  Looking for direction on symptoms I’m experiencing.  8/3 – Reclast IV; 8/4 fevers of

## 2020-08-12 NOTE — TELEPHONE ENCOUNTER
Spoke with patient and states she's still feeling the same (fever at 99, muscle/joint pains, can barely walk). She has taken ibuprofen around the clock, tylenol for fever, and hydrocodone last night.   She called Dr. Jas Austin office today to see what Dr. Copeland

## 2020-08-12 NOTE — TELEPHONE ENCOUNTER
Pt states she continues to have low grade fever of 99.1 (tymp). Also have right hip and knee pain rates 8 when walking, 1 or 2 when not walking. Slight swelling in right knee.  Pt also has right calf pain, states it feels extremely tight, rates 6 on pain sc

## 2020-08-14 ENCOUNTER — TELEPHONE (OUTPATIENT)
Dept: INTERNAL MEDICINE CLINIC | Facility: CLINIC | Age: 55
End: 2020-08-14

## 2020-08-14 NOTE — TELEPHONE ENCOUNTER
Spoke with pt,  verified, she stated she was seen in ER for leg pain, pt had negative for blod clot. Pt stated she had Reclast IV infusion on 8-3 for osteoporosis, pt had prolia for 5 years.    Pt stated last Tuesday she woke up with chills, bodyache,

## 2020-08-18 ENCOUNTER — MED REC SCAN ONLY (OUTPATIENT)
Dept: INTERNAL MEDICINE CLINIC | Facility: CLINIC | Age: 55
End: 2020-08-18

## 2020-08-19 NOTE — TELEPHONE ENCOUNTER
Routed to [facility] as Jay Jay Ruff for update on pt - had to leave a message    Pt did go to ED on 8/12/20

## 2020-08-20 NOTE — TELEPHONE ENCOUNTER
Patient was contacted and relayed below message. She also agreed that she didn't think she would have any reaction because she tolerated prolia and glad she will not have another infusion. She does not have any concern at this time.

## 2020-08-20 NOTE — TELEPHONE ENCOUNTER
Dr. Emanuel Marks,     Patient was contacted and states she's feeling better compared to last week although she feels sometimes she gets low grade fever. Knees, hips and calf pain are better and she is now able to stand and walk.   She was sent to ER by PCP to r/o

## 2020-08-20 NOTE — TELEPHONE ENCOUNTER
Ok, noted. She won't need another infusion - this was just to transition off the prolia. I'm not sure taking ibuprofen before infusion would have helped but unclear now. She had tolerating prolia well so I didn't think it would be necessary at the time.

## 2020-08-29 NOTE — TELEPHONE ENCOUNTER
Dr Jb You pls review below message, thanks.      Future Appointments   Date Time Provider Thien Cox   9/18/2020  8:30 AM Stefan Garcia MD [facility]   10/22/2020  7:30 AM Stefan Garcia MD [facility]

## 2020-09-15 LAB
ABSOLUTE BASOPHILS: 30 CELLS/UL (ref 0–200)
ABSOLUTE EOSINOPHILS: 104 CELLS/UL (ref 15–500)
ABSOLUTE LYMPHOCYTES: 2353 CELLS/UL (ref 850–3900)
ABSOLUTE MONOCYTES: 533 CELLS/UL (ref 200–950)
ABSOLUTE NEUTROPHILS: 4381 CELLS/UL (ref 1500–7800)
ALBUMIN/GLOBULIN RATIO: 1.8 (CALC) (ref 1–2.5)
ALBUMIN: 4.4 G/DL (ref 3.6–5.1)
ALKALINE PHOSPHATASE: 108 U/L (ref 37–153)
ALT: 15 U/L (ref 6–29)
AST: 16 U/L (ref 10–35)
BASOPHILS: 0.4 %
BILIRUBIN, TOTAL: 0.5 MG/DL (ref 0.2–1.2)
BUN: 17 MG/DL (ref 7–25)
CALCIUM: 9.6 MG/DL (ref 8.6–10.4)
CARBON DIOXIDE: 26 MMOL/L (ref 20–32)
CHLORIDE: 102 MMOL/L (ref 98–110)
CHOL/HDLC RATIO: 2.6 (CALC)
CHOLESTEROL, TOTAL: 219 MG/DL
CREATININE: 0.81 MG/DL (ref 0.5–1.05)
EGFR IF AFRICN AM: 96 ML/MIN/1.73M2
EGFR IF NONAFRICN AM: 83 ML/MIN/1.73M2
EOSINOPHILS: 1.4 %
GLOBULIN: 2.4 G/DL (CALC) (ref 1.9–3.7)
GLUCOSE: 93 MG/DL (ref 65–99)
HDL CHOLESTEROL: 84 MG/DL
HEMATOCRIT: 40.7 % (ref 35–45)
HEMOGLOBIN: 13.5 G/DL (ref 11.7–15.5)
LDL-CHOLESTEROL: 114 MG/DL (CALC)
LYMPHOCYTES: 31.8 %
MCH: 28.4 PG (ref 27–33)
MCHC: 33.2 G/DL (ref 32–36)
MCV: 85.5 FL (ref 80–100)
MONOCYTES: 7.2 %
MPV: 9.6 FL (ref 7.5–12.5)
NEUTROPHILS: 59.2 %
NON-HDL CHOLESTEROL: 135 MG/DL (CALC)
PLATELET COUNT: 336 THOUSAND/UL (ref 140–400)
POTASSIUM: 3.7 MMOL/L (ref 3.5–5.3)
PROTEIN, TOTAL: 6.8 G/DL (ref 6.1–8.1)
RDW: 13 % (ref 11–15)
RED BLOOD CELL COUNT: 4.76 MILLION/UL (ref 3.8–5.1)
SODIUM: 138 MMOL/L (ref 135–146)
TRIGLYCERIDES: 99 MG/DL
WHITE BLOOD CELL COUNT: 7.4 THOUSAND/UL (ref 3.8–10.8)

## 2020-09-18 ENCOUNTER — OFFICE VISIT (OUTPATIENT)
Dept: INTERNAL MEDICINE CLINIC | Facility: CLINIC | Age: 55
End: 2020-09-18
Payer: COMMERCIAL

## 2020-09-18 VITALS
RESPIRATION RATE: 18 BRPM | WEIGHT: 231 LBS | BODY MASS INDEX: 40.93 KG/M2 | HEIGHT: 63 IN | HEART RATE: 88 BPM | DIASTOLIC BLOOD PRESSURE: 80 MMHG | SYSTOLIC BLOOD PRESSURE: 120 MMHG

## 2020-09-18 DIAGNOSIS — I10 ESSENTIAL HYPERTENSION: Primary | ICD-10-CM

## 2020-09-18 DIAGNOSIS — E21.3 HYPERPARATHYROIDISM (HCC): ICD-10-CM

## 2020-09-18 DIAGNOSIS — Z12.31 VISIT FOR SCREENING MAMMOGRAM: ICD-10-CM

## 2020-09-18 DIAGNOSIS — K76.89 FOCAL NODULAR HYPERPLASIA OF LIVER: ICD-10-CM

## 2020-09-18 DIAGNOSIS — M81.0 AGE-RELATED OSTEOPOROSIS WITHOUT CURRENT PATHOLOGICAL FRACTURE: ICD-10-CM

## 2020-09-18 DIAGNOSIS — E78.49 OTHER HYPERLIPIDEMIA: ICD-10-CM

## 2020-09-18 DIAGNOSIS — Z23 NEED FOR VACCINATION: ICD-10-CM

## 2020-09-18 DIAGNOSIS — Z01.818 PREOP TESTING: ICD-10-CM

## 2020-09-18 PROBLEM — B34.9 ACUTE VIRAL SYNDROME: Status: RESOLVED | Noted: 2020-08-04 | Resolved: 2020-09-18

## 2020-09-18 PROCEDURE — 3008F BODY MASS INDEX DOCD: CPT | Performed by: INTERNAL MEDICINE

## 2020-09-18 PROCEDURE — 90472 IMMUNIZATION ADMIN EACH ADD: CPT | Performed by: INTERNAL MEDICINE

## 2020-09-18 PROCEDURE — 3079F DIAST BP 80-89 MM HG: CPT | Performed by: INTERNAL MEDICINE

## 2020-09-18 PROCEDURE — 90471 IMMUNIZATION ADMIN: CPT | Performed by: INTERNAL MEDICINE

## 2020-09-18 PROCEDURE — 90715 TDAP VACCINE 7 YRS/> IM: CPT | Performed by: INTERNAL MEDICINE

## 2020-09-18 PROCEDURE — 90686 IIV4 VACC NO PRSV 0.5 ML IM: CPT | Performed by: INTERNAL MEDICINE

## 2020-09-18 PROCEDURE — 3074F SYST BP LT 130 MM HG: CPT | Performed by: INTERNAL MEDICINE

## 2020-09-18 PROCEDURE — 99214 OFFICE O/P EST MOD 30 MIN: CPT | Performed by: INTERNAL MEDICINE

## 2020-09-18 NOTE — PATIENT INSTRUCTIONS
Problem List Items Addressed This Visit        Unprioritized    Essential hypertension - Primary     Blood pressure 120/80, pulse 88, resp.  rate 18, height 5' 3\" (1.6 m), weight 231 lb (104.8 kg), last menstrual period 04/01/2014, not currently breastfeed IM USE    FLULAVAL INFLUENZA VACCINE QUAD PRESERVATIVE FREE 0.5 ML

## 2020-09-18 NOTE — ASSESSMENT & PLAN NOTE
History of focal nodular hyperplasia which has been stable. Follow-up was due in May 2020 but was refused by her insurance. Patient was then seen by Dr. Inocencio Victoria. Plan is to repeat next year.

## 2020-09-18 NOTE — ASSESSMENT & PLAN NOTE
Patient did complete almost 5 years on Prolia. She was advised to follow-up with endocrinology for management of needs post Prolia.   She was given 1 dose of Reclast and had significant adverse reaction with high temperatures, severe body aches and pains a

## 2020-09-18 NOTE — ASSESSMENT & PLAN NOTE
Blood pressure 120/80, pulse 88, resp. rate 18, height 5' 3\" (1.6 m), weight 231 lb (104.8 kg), last menstrual period 04/01/2014, not currently breastfeeding. Blood pressure tends to fluctuate but recheck manually is always better.   Renal functions have

## 2020-09-18 NOTE — ASSESSMENT & PLAN NOTE
Lipid panel and liver function tests look stable on rosuvastatin at 5 mg daily. Continue the same dose of medication, recheck labs in about 6 months.

## 2020-09-18 NOTE — ASSESSMENT & PLAN NOTE
PTH levels have normalized after supplementation of vitamin D. Both labs look normal in June.   Continue to monitor

## 2020-09-18 NOTE — PROGRESS NOTES
HPI:    Patient ID: Cristofer Demarco is a 49year old female.       Immunization History  Administered            Date(s) Administered    FLULAVAL 6 months & older 0.5 ml Prefilled syringe ([id number])                          11/30/2018 12/06/2019      FLUZ The current treatment provides significant improvement of lipids. Compliance problems include adherence to diet and adherence to exercise.   Risk factors for coronary artery disease include post-menopausal, a sedentary lifestyle, stress, obesity and dyslipi clear and moist. No oropharyngeal exudate. Eyes: Pupils are equal, round, and reactive to light. Conjunctivae and EOM are normal. Right eye exhibits no discharge. Left eye exhibits no discharge. Neck: Normal range of motion. Neck supple.  No JVD present resp. rate 18, height 5' 3\" (1.6 m), weight 231 lb (104.8 kg), last menstrual period 04/01/2014, not currently breastfeeding. Blood pressure tends to fluctuate but recheck manually is always better. Renal functions have been stable .   Patient has tolera Platelet [E]      CBC W Differential W Platelet [E]      Comp Metabolic Panel (14) [E]      Lipid Panel [E]      Urinalysis, Routine [E]      Basic Metabolic Panel (8) [E]      CBC W Differential W Platelet [E]      TdaP (Boostrix/Adacel) Vaccine (> 7 Y)

## 2020-10-05 ENCOUNTER — EKG ENCOUNTER (OUTPATIENT)
Dept: LAB | Facility: HOSPITAL | Age: 55
End: 2020-10-05
Attending: INTERNAL MEDICINE
Payer: COMMERCIAL

## 2020-10-05 DIAGNOSIS — Z01.818 PREOP TESTING: ICD-10-CM

## 2020-10-05 PROCEDURE — 93005 ELECTROCARDIOGRAM TRACING: CPT

## 2020-10-05 PROCEDURE — 93010 ELECTROCARDIOGRAM REPORT: CPT | Performed by: INTERNAL MEDICINE

## 2020-10-12 RX ORDER — PANTOPRAZOLE SODIUM 40 MG/1
TABLET, DELAYED RELEASE ORAL
Qty: 90 TABLET | Refills: 3 | Status: SHIPPED | OUTPATIENT
Start: 2020-10-12 | End: 2021-12-02

## 2020-10-20 ENCOUNTER — HOSPITAL ENCOUNTER (OUTPATIENT)
Dept: MAMMOGRAPHY | Age: 55
Discharge: HOME | End: 2020-10-20
Attending: INTERNAL MEDICINE
Payer: COMMERCIAL

## 2020-10-20 DIAGNOSIS — Z12.31 VISIT FOR SCREENING MAMMOGRAM: ICD-10-CM

## 2020-10-20 PROCEDURE — 77067 SCR MAMMO BI INCL CAD: CPT | Performed by: INTERNAL MEDICINE

## 2020-10-20 PROCEDURE — 77063 BREAST TOMOSYNTHESIS BI: CPT | Performed by: INTERNAL MEDICINE

## 2020-10-21 ENCOUNTER — MED REC SCAN ONLY (OUTPATIENT)
Dept: INTERNAL MEDICINE CLINIC | Facility: CLINIC | Age: 55
End: 2020-10-21

## 2020-10-22 ENCOUNTER — OFFICE VISIT (OUTPATIENT)
Dept: INTERNAL MEDICINE CLINIC | Facility: CLINIC | Age: 55
End: 2020-10-22
Payer: COMMERCIAL

## 2020-10-22 VITALS
RESPIRATION RATE: 22 BRPM | WEIGHT: 234 LBS | BODY MASS INDEX: 41.46 KG/M2 | HEART RATE: 94 BPM | HEIGHT: 63 IN | SYSTOLIC BLOOD PRESSURE: 127 MMHG | DIASTOLIC BLOOD PRESSURE: 87 MMHG

## 2020-10-22 DIAGNOSIS — Z01.818 PREOP EXAMINATION: Primary | ICD-10-CM

## 2020-10-22 PROCEDURE — 3074F SYST BP LT 130 MM HG: CPT | Performed by: INTERNAL MEDICINE

## 2020-10-22 PROCEDURE — 99214 OFFICE O/P EST MOD 30 MIN: CPT | Performed by: INTERNAL MEDICINE

## 2020-10-22 PROCEDURE — 3079F DIAST BP 80-89 MM HG: CPT | Performed by: INTERNAL MEDICINE

## 2020-10-22 PROCEDURE — 3008F BODY MASS INDEX DOCD: CPT | Performed by: INTERNAL MEDICINE

## 2020-10-22 RX ORDER — OLMESARTAN MEDOXOMIL 40 MG/1
20 TABLET ORAL DAILY
Qty: 90 TABLET | Refills: 1 | Status: SHIPPED | OUTPATIENT
Start: 2020-10-22 | End: 2020-12-19

## 2020-10-22 RX ORDER — METOPROLOL SUCCINATE 25 MG/1
25 TABLET, EXTENDED RELEASE ORAL DAILY
Qty: 90 TABLET | Refills: 1 | Status: SHIPPED | OUTPATIENT
Start: 2020-10-22 | End: 2020-12-19

## 2020-10-22 RX ORDER — ROSUVASTATIN CALCIUM 5 MG/1
5 TABLET, COATED ORAL NIGHTLY
Qty: 90 TABLET | Refills: 1 | Status: SHIPPED | OUTPATIENT
Start: 2020-10-22 | End: 2020-12-19

## 2020-10-22 NOTE — PROGRESS NOTES
Ricarda Haynes is a 49year old female who presents for a pre-operative physical exam.    Ricarda Haynes is scheduled for a(joint fusion, right 1st metatarsal w/ Dr Lurdes Cisneros on 11/03/2020     HPI related to surgery:       She  has had previ Endo/Heme/Allergies: Negative. Psychiatric/Behavioral: Negative.             PHYSICAL EXAM:   /87 (BP Location: Left arm, Patient Position: Sitting, Cuff Size: large)   Pulse 94   Resp 22   Ht 5' 3\" (1.6 m)   Wt 234 lb (106.1 kg)   LMP 04/01/201 surgical candidate. This consult was sent back the referring physician, Dr. Carmen Valadez. .    Assessment:  Preop examination  (primary encounter diagnosis)    Problem List Items Addressed This Visit        Unprioritized    Preop examination - Primary     Normal ex

## 2020-10-22 NOTE — ASSESSMENT & PLAN NOTE
Normal exam.    Patient is medically cleared for surgery     Labs, EKG-normal.  Results attached. Stop all ibuprofen, aspirin, over-the-counter cough and cold medications, vitamins as well as supplements for 10 days prior to surgery.     Call if you deve

## 2020-10-22 NOTE — PATIENT INSTRUCTIONS
Problem List Items Addressed This Visit        Unprioritized    Preop examination - Primary     Normal exam.    Patient is medically cleared for surgery     Labs, EKG-normal.  Results attached.     Stop all ibuprofen, aspirin, over-the-counter cough and col

## 2020-11-04 ENCOUNTER — PATIENT MESSAGE (OUTPATIENT)
Dept: ENDOCRINOLOGY CLINIC | Facility: CLINIC | Age: 55
End: 2020-11-04

## 2020-11-04 DIAGNOSIS — M81.8 OTHER OSTEOPOROSIS WITHOUT CURRENT PATHOLOGICAL FRACTURE: Primary | ICD-10-CM

## 2020-11-05 NOTE — TELEPHONE ENCOUNTER
From: Sara Bhardwaj  To: Sofia Saeed MD  Sent: 11/4/2020 7:33 AM CST  Subject: Visit Follow-up Question    I have lab work to complete prior to my next visit. My question is about timing.      I had my last labs in June prior to my early July telehea

## 2020-11-05 NOTE — TELEPHONE ENCOUNTER
Dr. Buckner [place].    Patient sent [address] message:  Labs 6/19/20  LOV: 7/3/20  RTC: 1 year  Reclast tx -done in Aug 2020  Dexa due Jan 2021 (pt needs order)    Patient asking when she should have labs (PTH, vitamin D, alk phos) drawn-please advise-thanks

## 2020-11-05 NOTE — TELEPHONE ENCOUNTER
Lets do labs in January with DEXA. Ok to place order for DEXA and schedule f/u visit in January after DEXA and labs. Thanks.

## 2020-11-10 NOTE — TELEPHONE ENCOUNTER
Order for bone density placed in chart. RN responded to patient with Dr. Morris Staff message as below.

## 2020-12-19 RX ORDER — OLMESARTAN MEDOXOMIL 40 MG/1
20 TABLET ORAL DAILY
Qty: 90 TABLET | Refills: 1 | Status: SHIPPED | OUTPATIENT
Start: 2020-12-19 | End: 2021-12-02

## 2020-12-19 RX ORDER — ROSUVASTATIN CALCIUM 5 MG/1
5 TABLET, COATED ORAL NIGHTLY
Qty: 90 TABLET | Refills: 1 | Status: SHIPPED | OUTPATIENT
Start: 2020-12-19 | End: 2021-06-07

## 2020-12-19 RX ORDER — HYDROCHLOROTHIAZIDE 25 MG/1
25 TABLET ORAL DAILY
Qty: 90 TABLET | Refills: 1 | Status: SHIPPED | OUTPATIENT
Start: 2020-12-19 | End: 2021-06-07

## 2020-12-19 RX ORDER — METOPROLOL SUCCINATE 25 MG/1
25 TABLET, EXTENDED RELEASE ORAL DAILY
Qty: 90 TABLET | Refills: 1 | Status: SHIPPED | OUTPATIENT
Start: 2020-12-19 | End: 2021-06-07

## 2020-12-21 RX ORDER — ERGOCALCIFEROL 1.25 MG/1
100000 CAPSULE ORAL WEEKLY
Qty: 24 CAPSULE | Refills: 3 | Status: SHIPPED | OUTPATIENT
Start: 2020-12-21

## 2021-01-01 NOTE — TELEPHONE ENCOUNTER
Pt calling to schedule surgery
Pt is calling back still haven't received surgery date said has been waiting over 2 weeks for date please advise
Spoke with patient. Scheduled her on 2/14/20 for a cheilectomy left foot with Dr. Eliazar Cardoza. Scheduled 1 & 2 week post op visits. Reviewed medication guidelines with pt over the phone but I will send her a copy of list in the mail.   She verbalized Sarah
[facility]
Tasking to [employer]
12

## 2021-01-16 ENCOUNTER — HOSPITAL ENCOUNTER (OUTPATIENT)
Dept: BONE DENSITY | Age: 56
Discharge: HOME | End: 2021-01-16
Attending: INTERNAL MEDICINE
Payer: COMMERCIAL

## 2021-01-16 DIAGNOSIS — M81.8 OTHER OSTEOPOROSIS WITHOUT CURRENT PATHOLOGICAL FRACTURE: ICD-10-CM

## 2021-01-16 PROCEDURE — 77080 DXA BONE DENSITY AXIAL: CPT | Performed by: INTERNAL MEDICINE

## 2021-02-02 ENCOUNTER — PATIENT MESSAGE (OUTPATIENT)
Dept: ENDOCRINOLOGY CLINIC | Facility: CLINIC | Age: 56
End: 2021-02-02

## 2021-02-03 NOTE — TELEPHONE ENCOUNTER
From: Claudia Castaneda  To: Poly Osullivan MD  Sent: 2/2/2021 10:23 AM CST  Subject: Non-Urgent Medical Question    I have a follow up appointment on 2/12. I did my blood work in late December and my bone density test in January.  I received a letter this

## 2021-02-03 NOTE — TELEPHONE ENCOUNTER
Reviewed chart and it does look like recent lab orders from [facility] and DEXA bone scan have been completed. There are outstanding orders from 7/2020 but these are duplicates of orders completed most recently. No other outstanding orders from our office seen.  Willy Vargas

## 2021-02-12 ENCOUNTER — OFFICE VISIT (OUTPATIENT)
Dept: ENDOCRINOLOGY CLINIC | Facility: CLINIC | Age: 56
End: 2021-02-12
Payer: COMMERCIAL

## 2021-02-12 VITALS
BODY MASS INDEX: 44 KG/M2 | SYSTOLIC BLOOD PRESSURE: 119 MMHG | DIASTOLIC BLOOD PRESSURE: 88 MMHG | WEIGHT: 249 LBS | HEART RATE: 99 BPM

## 2021-02-12 DIAGNOSIS — M81.8 OTHER OSTEOPOROSIS WITHOUT CURRENT PATHOLOGICAL FRACTURE: Primary | ICD-10-CM

## 2021-02-12 PROCEDURE — 3079F DIAST BP 80-89 MM HG: CPT | Performed by: INTERNAL MEDICINE

## 2021-02-12 PROCEDURE — 99213 OFFICE O/P EST LOW 20 MIN: CPT | Performed by: INTERNAL MEDICINE

## 2021-02-12 PROCEDURE — 3074F SYST BP LT 130 MM HG: CPT | Performed by: INTERNAL MEDICINE

## 2021-02-12 NOTE — PROGRESS NOTES
Name: Kaylynn Martines  Date: 2/12/2021      HISTORY OF PRESENT ILLNESS   Kaylynn Martines is a 48year old female who presents for Patient presents with:  Osteoporosis: Pt reports she did not tolerate the reclast infusion, reports she had fever, chi breath, wheezing or BADILLO  Cardiovascular:  no chest pain or palpitations  Gastrointestinal:  no abdominal pain, bowel movement problems  Musculoskeletal: no muscle pain or arthralgia  /Gyne: no frequency or discomfort while urinating  Psychiatric:  no acu and unspecified hyperlipidemia    • Unspecified essential hypertension        Surgical history:   Past Surgical History:   Procedure Laterality Date   • COLONOSCOPY  2012   • LEEP  2004       PHYSICAL EXAM  /88   Pulse 99   Wt 249 lb (112.9 kg)   LMP

## 2021-03-14 LAB
ABSOLUTE BASOPHILS: 24 CELLS/UL (ref 0–200)
ABSOLUTE EOSINOPHILS: 150 CELLS/UL (ref 15–500)
ABSOLUTE LYMPHOCYTES: 2520 CELLS/UL (ref 850–3900)
ABSOLUTE MONOCYTES: 482 CELLS/UL (ref 200–950)
ABSOLUTE NEUTROPHILS: 4724 CELLS/UL (ref 1500–7800)
ALBUMIN/GLOBULIN RATIO: 1.8 (CALC) (ref 1–2.5)
ALBUMIN: 4.4 G/DL (ref 3.6–5.1)
ALKALINE PHOSPHATASE: 123 U/L (ref 37–153)
ALT: 11 U/L (ref 6–29)
AST: 18 U/L (ref 10–35)
BASOPHILS: 0.3 %
BILIRUBIN, TOTAL: 0.4 MG/DL (ref 0.2–1.2)
BILIRUBIN: NEGATIVE
BUN: 17 MG/DL (ref 7–25)
CALCIUM: 9.5 MG/DL (ref 8.6–10.4)
CARBON DIOXIDE: 28 MMOL/L (ref 20–32)
CHLORIDE: 102 MMOL/L (ref 98–110)
CHOL/HDLC RATIO: 2.3 (CALC)
CHOLESTEROL, TOTAL: 171 MG/DL
COLOR: YELLOW
CREATININE: 0.78 MG/DL (ref 0.5–1.05)
EGFR IF AFRICN AM: 100 ML/MIN/1.73M2
EGFR IF NONAFRICN AM: 86 ML/MIN/1.73M2
EOSINOPHILS: 1.9 %
GLOBULIN: 2.5 G/DL (CALC) (ref 1.9–3.7)
GLUCOSE: 90 MG/DL (ref 65–99)
GLUCOSE: NEGATIVE
HDL CHOLESTEROL: 74 MG/DL
HEMATOCRIT: 36.2 % (ref 35–45)
HEMOGLOBIN: 11.6 G/DL (ref 11.7–15.5)
KETONES: NEGATIVE
LDL-CHOLESTEROL: 75 MG/DL (CALC)
LYMPHOCYTES: 31.9 %
MCH: 26.1 PG (ref 27–33)
MCHC: 32 G/DL (ref 32–36)
MCV: 81.3 FL (ref 80–100)
MONOCYTES: 6.1 %
MPV: 10.4 FL (ref 7.5–12.5)
NEUTROPHILS: 59.8 %
NITRITE: NEGATIVE
NON-HDL CHOLESTEROL: 97 MG/DL (CALC)
OCCULT BLOOD: NEGATIVE
PH: 7.5 (ref 5–8)
PLATELET COUNT: 346 THOUSAND/UL (ref 140–400)
POTASSIUM: 3.7 MMOL/L (ref 3.5–5.3)
PROTEIN, TOTAL: 6.9 G/DL (ref 6.1–8.1)
PROTEIN: NEGATIVE
RDW: 12.5 % (ref 11–15)
RED BLOOD CELL COUNT: 4.45 MILLION/UL (ref 3.8–5.1)
SODIUM: 140 MMOL/L (ref 135–146)
SPECIFIC GRAVITY: 1.01 (ref 1–1.03)
TRIGLYCERIDES: 135 MG/DL
WHITE BLOOD CELL COUNT: 7.9 THOUSAND/UL (ref 3.8–10.8)

## 2021-03-17 LAB — ALKALINE PHOSPHATASE, BONE SPECIFIC: 16.9 MCG/L (ref 5.6–29)

## 2021-03-18 ENCOUNTER — TELEPHONE (OUTPATIENT)
Dept: ENDOCRINOLOGY CLINIC | Facility: CLINIC | Age: 56
End: 2021-03-18

## 2021-03-18 NOTE — TELEPHONE ENCOUNTER
Please call patient - her rate of bone loss has increased significantly in the past few months.   Given this increase and the DEXA scan I would like to restart prolia therapy and not wait until August.  Please start insurance auth and schedule once approved

## 2021-03-18 NOTE — TELEPHONE ENCOUNTER
Spoke to patient to relay message below. Patient stated understanding and will schedule [place] for Prolia once approved by insurance.

## 2021-03-18 NOTE — TELEPHONE ENCOUNTER
Medication PA Requested:                            Denosumab (PROLIA) 60 MG/ML injection 60 mg                              CoverMyMeds Used:  Key:  Sig: inject 60mg subcutaneously to upper arm  DX Code:    M81.8                                 CPT code (

## 2021-03-19 ENCOUNTER — OFFICE VISIT (OUTPATIENT)
Dept: INTERNAL MEDICINE CLINIC | Facility: CLINIC | Age: 56
End: 2021-03-19
Payer: COMMERCIAL

## 2021-03-19 VITALS
BODY MASS INDEX: 43.68 KG/M2 | WEIGHT: 246.5 LBS | HEIGHT: 63 IN | HEART RATE: 94 BPM | SYSTOLIC BLOOD PRESSURE: 138 MMHG | DIASTOLIC BLOOD PRESSURE: 84 MMHG | TEMPERATURE: 98 F

## 2021-03-19 DIAGNOSIS — I10 ESSENTIAL HYPERTENSION: ICD-10-CM

## 2021-03-19 DIAGNOSIS — Z00.00 ROUTINE PHYSICAL EXAMINATION: Primary | ICD-10-CM

## 2021-03-19 DIAGNOSIS — K44.9 HIATAL HERNIA: ICD-10-CM

## 2021-03-19 DIAGNOSIS — M81.0 AGE-RELATED OSTEOPOROSIS WITHOUT CURRENT PATHOLOGICAL FRACTURE: ICD-10-CM

## 2021-03-19 DIAGNOSIS — E78.49 OTHER HYPERLIPIDEMIA: ICD-10-CM

## 2021-03-19 DIAGNOSIS — K76.89 FOCAL NODULAR HYPERPLASIA OF LIVER: ICD-10-CM

## 2021-03-19 DIAGNOSIS — I27.20 PULMONARY HTN (HCC): ICD-10-CM

## 2021-03-19 DIAGNOSIS — E21.3 HYPERPARATHYROIDISM (HCC): ICD-10-CM

## 2021-03-19 DIAGNOSIS — D64.9 ANEMIA, UNSPECIFIED TYPE: ICD-10-CM

## 2021-03-19 PROCEDURE — 99396 PREV VISIT EST AGE 40-64: CPT | Performed by: INTERNAL MEDICINE

## 2021-03-19 PROCEDURE — 3008F BODY MASS INDEX DOCD: CPT | Performed by: INTERNAL MEDICINE

## 2021-03-19 PROCEDURE — 3079F DIAST BP 80-89 MM HG: CPT | Performed by: INTERNAL MEDICINE

## 2021-03-19 PROCEDURE — 3075F SYST BP GE 130 - 139MM HG: CPT | Performed by: INTERNAL MEDICINE

## 2021-03-19 NOTE — ASSESSMENT & PLAN NOTE
Blood pressure 138/84, pulse 94, temperature 98.3 °F (36.8 °C), temperature source Tympanic, height 5' 3\" (1.6 m), weight 246 lb 8 oz (111.8 kg), last menstrual period 04/01/2014, not currently breastfeeding.   Blood pressures look stable, patient's home r

## 2021-03-19 NOTE — ASSESSMENT & PLAN NOTE
Normal exam.  Labs as ordered. Skin check normal.  No significant abnormal nevi. Breast exam completed–no palpable abnormalities, discharge from the nipples or axillary adenopathy.   Mammogram due on 10/20/2021  dexa scan due in1/2023  No cervical or ingu

## 2021-03-19 NOTE — PATIENT INSTRUCTIONS
Problem List Items Addressed This Visit        Unprioritized    Essential hypertension     Blood pressure 138/84, pulse 94, temperature 98.3 °F (36.8 °C), temperature source Tympanic, height 5' 3\" (1.6 m), weight 246 lb 8 oz (111.8 kg), last menstrual per discuss this with gastroenterology and consider a follow-up evaluation. Next colonoscopy is due in June 2022         Hyperparathyroidism ([address]     PTH levels have normalized after supplementation of vitamin D. Both labs look normal 12/2020.   Continue to

## 2021-03-19 NOTE — ASSESSMENT & PLAN NOTE
PTH levels have normalized after supplementation of vitamin D. Both labs look normal 12/2020. Continue to monitor  May stop high-dose vitamin D supplementation and continue on over-the-counter vitamin D at 2000 units daily.

## 2021-03-19 NOTE — ASSESSMENT & PLAN NOTE
History of focal nodular hyperplasia which has been stable. Follow-up MRI of the liver has been requested.   Follow-up with Dr. Logan Roberts has been recommended

## 2021-03-19 NOTE — ASSESSMENT & PLAN NOTE
Large hiatal hernia with intrathoracic placement of the stomach. Patient has been largely asymptomatic other than pain in the right upper quadrant as well as reflux for which she has been on pantoprazole.   She has had an EGD and has been followed by gastr

## 2021-03-19 NOTE — PROGRESS NOTES
HPI:   Liset Garay is a 48year old female who presents for an Annual Health Visit. Blood counts show mild anemia-this has not been seen in the past.  We will need to recheck the labs for the blood counts in about 4 weeks.   Please let us k by (CST): Sonya Euceda MD on 12/24/2016 at 19:39           Impression   CONCLUSION:     1. Calcium score of 0.   2. Large hiatal hernia with partial intrathoracic herniation of the stomach, increased in size since previous study of April 2012.    3. Smal Smoking status: Never Smoker      Smokeless tobacco: Never Used    Vaping Use      Vaping Use: Never used    Alcohol use: Yes      Comment: wine, 2 glasses/week    Drug use: No    Social History    Social History Narrative      Not on file       REVIEW OF sounds: Normal heart sounds. No murmur heard. Pulmonary:      Effort: Pulmonary effort is normal.      Breath sounds: Normal breath sounds. No wheezing or rales. Chest:      Chest wall: No mass or tenderness.       Breasts: Breasts are symmetrical. Nerves: No cranial nerve deficit. Motor: No abnormal muscle tone. Coordination: Coordination normal.      Deep Tendon Reflexes: Reflexes are normal and symmetric.    Psychiatric:         Mood and Affect: Mood and affect normal.         Behavior: B Focal nodular hyperplasia of liver     History of focal nodular hyperplasia which has been stable. Follow-up MRI of the liver has been requested.   Follow-up with Dr. Inocencio Victoria has been recommended         Relevant Orders    AFP WITH AFP-L3%    MRI LIVER(CONT abnormalities. Hemorrhoids-small internal present. Brown stools,guaic negetive  Pelvic exam completed–no cervical movement tenderness, adnexal palpable abnormalities. No cystocele, rectocele or uterine descent.   Pap Smear,3 Years due on 12/06/2022    Im

## 2021-03-21 LAB — C-TELOPEPTIDE, SERUM: 413 PG/ML

## 2021-03-22 NOTE — TELEPHONE ENCOUNTER
PA submitted for Prolia with supporting clincials through covermymeds KEY: [id number]. Awaiting response from plan.

## 2021-03-25 NOTE — TELEPHONE ENCOUNTER
PA is pending still on covermymeds. Contacted Norberto several times to  by calling [phone].  Followed prompts for authorizations and was transferred to [id number] [address] but could not reach live person to discuss PA based on prompts as they did not apply to Prolia

## 2021-03-26 NOTE — TELEPHONE ENCOUNTER
Patient scheduled 4/30/21. Offered sooner apt, but patient declined, states she will be getting her second dose of Covid vaccine 04/14/21. No further questions.

## 2021-04-03 ENCOUNTER — HOSPITAL ENCOUNTER (OUTPATIENT)
Dept: CV DIAGNOSTICS | Facility: HOSPITAL | Age: 56
Discharge: HOME | End: 2021-04-03
Attending: INTERNAL MEDICINE
Payer: COMMERCIAL

## 2021-04-03 DIAGNOSIS — I27.20 PULMONARY HTN (HCC): ICD-10-CM

## 2021-04-03 PROCEDURE — 93306 TTE W/DOPPLER COMPLETE: CPT | Performed by: INTERNAL MEDICINE

## 2021-04-21 ENCOUNTER — TELEPHONE (OUTPATIENT)
Dept: GASTROENTEROLOGY | Facility: CLINIC | Age: 56
End: 2021-04-21

## 2021-04-21 NOTE — TELEPHONE ENCOUNTER
Noted. Last colonoscopy examination appears to have been 5/3/2012. See colonoscopy and EGD reports under 4/27/2020 patient message.

## 2021-04-21 NOTE — TELEPHONE ENCOUNTER
Pt called in stating that she needs to see Dr. Win Castro as soon as possible per her PCP. She is has severe anemia and may have internal bleeding.  Pt states she had a tele health visit with Dr. Win Castro in 2020 I did not see this in the chart but I saw notes fr

## 2021-04-21 NOTE — TELEPHONE ENCOUNTER
Noted.    Ms. Juli Buckley does indeed need EGD and colonoscopy examinations to evaluate this iron deficiency anemia. If she did not have it surgically corrected, the anemia may be due to her previous hiatal hernia.     Okay to schedule her directly for the e

## 2021-04-21 NOTE — TELEPHONE ENCOUNTER
I spoke to the pt and she does not want to proceed with the colonoscopy/EGD at this time    She prefers to speak to you at her office visit in May    She is convinced after \"goggling\" that her anemia has something to do with the Reclast she has August 20

## 2021-04-22 ENCOUNTER — TELEPHONE (OUTPATIENT)
Dept: GASTROENTEROLOGY | Facility: CLINIC | Age: 56
End: 2021-04-22

## 2021-04-22 DIAGNOSIS — R10.11 RUQ PAIN: ICD-10-CM

## 2021-04-22 DIAGNOSIS — K21.9 GASTROESOPHAGEAL REFLUX DISEASE, UNSPECIFIED WHETHER ESOPHAGITIS PRESENT: Primary | ICD-10-CM

## 2021-04-22 DIAGNOSIS — R10.13 EPIGASTRIC PAIN: ICD-10-CM

## 2021-04-22 DIAGNOSIS — D50.9 IRON DEFICIENCY ANEMIA, UNSPECIFIED IRON DEFICIENCY ANEMIA TYPE: ICD-10-CM

## 2021-04-22 NOTE — TELEPHONE ENCOUNTER
Patient is ready to schedule her procedure. Please advise on orders and prep. Thank you. Per 04/21/2021 telephone encounter-  Umm Castano MD         4/21/21 2:58 PM  Note     Noted.     Ms. Yanez does indeed need EGD and colonoscopy e

## 2021-04-22 NOTE — TELEPHONE ENCOUNTER
Pt called in stating she has decided to proceed with the procedure. Pt also states she is supposed to schedule the procedure directly without an appt beforehand.  Please follow up

## 2021-04-23 ENCOUNTER — PATIENT MESSAGE (OUTPATIENT)
Dept: GASTROENTEROLOGY | Facility: CLINIC | Age: 56
End: 2021-04-23

## 2021-04-23 RX ORDER — POLYETHYLENE GLYCOL 3350, SODIUM SULFATE ANHYDROUS, SODIUM BICARBONATE, SODIUM CHLORIDE, POTASSIUM CHLORIDE 236; 22.74; 6.74; 5.86; 2.97 G/4L; G/4L; G/4L; G/4L; G/4L
4 POWDER, FOR SOLUTION ORAL ONCE
Qty: 1 BOTTLE | Refills: 0 | Status: SHIPPED | OUTPATIENT
Start: 2021-04-23 | End: 2021-04-23

## 2021-04-23 NOTE — TELEPHONE ENCOUNTER
Scheduled for:  Colonoscopy - 60394 & EGD - 50681  Provider Name:  Dr. Antonio Santos  Date:  5/4/21  Location:  [facility]  Sedation:  MAC  Time:  9:30 am (pt is aware to arrive at 8:30 am)  Prep:  Golytely, Prep instructions were given to pt over the phone, pt verbaliz

## 2021-04-23 NOTE — TELEPHONE ENCOUNTER
Thanks [employer]. Office visit annual health examination with Dr. Shen Jurado 3/19/2021 reviewed. See colonoscopy and EGD reports under 4/27/2020 patient message.     GI schedulers–    Schedule EGD & colonoscopy exam at [address] ??[facility]

## 2021-04-26 ENCOUNTER — TELEPHONE (OUTPATIENT)
Dept: ENDOCRINOLOGY CLINIC | Facility: CLINIC | Age: 56
End: 2021-04-26

## 2021-04-26 RX ORDER — SODIUM, POTASSIUM,MAG SULFATES 17.5-3.13G
SOLUTION, RECONSTITUTED, ORAL ORAL
Qty: 1 BOTTLE | Refills: 0 | Status: SHIPPED | OUTPATIENT
Start: 2021-04-26 | End: 2021-05-21

## 2021-04-26 NOTE — TELEPHONE ENCOUNTER
Dr Tino Barrett,    I sent Suprep to the pt's pharmacy for her per her request on MyChart.     She is aware the instructions have been sent to [address]

## 2021-04-26 NOTE — TELEPHONE ENCOUNTER
CMA's/RMA's -- can you please assist on below? Per 3/18/21 an approval was received and scanned. Can you assist in verifying with insurance and update the patient? Thank you.      Future Appointments   Date Time Provider Thien Cox   4/30/2021  9:0

## 2021-04-26 NOTE — TELEPHONE ENCOUNTER
Pt called in stating her prolia injection is not going to be approved by her insurance. She is wondering how to proceed with this. She states the insurance states it is out of network.  Please follow up

## 2021-04-26 NOTE — TELEPHONE ENCOUNTER
[employer] again, twice. Unable to get though to someone who can help. Call disconnected second time.

## 2021-04-28 ENCOUNTER — PATIENT MESSAGE (OUTPATIENT)
Dept: ENDOCRINOLOGY CLINIC | Facility: CLINIC | Age: 56
End: 2021-04-28

## 2021-04-28 NOTE — TELEPHONE ENCOUNTER
Called insurance again and was advised there weren't any problems on their end and that patient is approved for two injections from 3/22/21 to 12/31/22      Ref# for call is patient advocate: Anthony SANDERSON      Called patient who informed us that prolia coverage

## 2021-04-29 NOTE — TELEPHONE ENCOUNTER
Spoke to [facility] [employer] and benefits department [phone] Ref: Faisal Scott 1648. Placed call to patient to inform her that she can come to her appointment tomorrow for prolia injection. Prolia is billed through the clinic and provider.  Both are in

## 2021-04-29 NOTE — TELEPHONE ENCOUNTER
From: Balaji Batres  To: Trey Mak MD  Sent: 4/28/2021 2:03 PM CDT  Subject: Other    [employer] authorized the Prolia, but they're stating that [address] is out of network (letter attached). know that it shouldn't be.  They said i

## 2021-04-30 ENCOUNTER — TELEPHONE (OUTPATIENT)
Dept: ENDOCRINOLOGY CLINIC | Facility: CLINIC | Age: 56
End: 2021-04-30

## 2021-04-30 ENCOUNTER — NURSE ONLY (OUTPATIENT)
Dept: ENDOCRINOLOGY CLINIC | Facility: CLINIC | Age: 56
End: 2021-04-30
Payer: COMMERCIAL

## 2021-04-30 DIAGNOSIS — M81.8 OTHER OSTEOPOROSIS WITHOUT CURRENT PATHOLOGICAL FRACTURE: Primary | ICD-10-CM

## 2021-04-30 DIAGNOSIS — E55.9 VITAMIN D DEFICIENCY: ICD-10-CM

## 2021-04-30 PROCEDURE — 96372 THER/PROPH/DIAG INJ SC/IM: CPT | Performed by: INTERNAL MEDICINE

## 2021-04-30 NOTE — TELEPHONE ENCOUNTER
Patient arrived at clinic for prolia inj - stated she clarified below with her insurance and she is good to get inj today

## 2021-04-30 NOTE — TELEPHONE ENCOUNTER
Dr. Jay Lozoya,  Patient had Prolia inj today - asking for lab orders for next inj    Ref Range & Units 3/13/21 11:36 AM   ALKALINE PHOSPHATASE, BONE SPECIFIC   5.6 - 29.0 mcg/L 16.9      Component   Ref Range & Units 3/13/21 11:33 AM   GLUCOSE   65 - 99 mg/dL

## 2021-04-30 NOTE — PROGRESS NOTES
Patient arrived at clinic for Prolia injection - last injection 12/23/19 (patient tried/failed reclast 8/2020)  Patient given and tolerated 60 mg inj of Prolia to left upper arm.   Patient stated understanding to return for next injection in 6 months - advi

## 2021-05-01 ENCOUNTER — LAB ENCOUNTER (OUTPATIENT)
Dept: LAB | Facility: HOSPITAL | Age: 56
End: 2021-05-01
Attending: INTERNAL MEDICINE
Payer: COMMERCIAL

## 2021-05-01 DIAGNOSIS — Z01.818 PRE-OP TESTING: ICD-10-CM

## 2021-05-04 ENCOUNTER — ANESTHESIA (OUTPATIENT)
Dept: ENDOSCOPY | Facility: HOSPITAL | Age: 56
End: 2021-05-04
Payer: COMMERCIAL

## 2021-05-04 ENCOUNTER — HOSPITAL ENCOUNTER (OUTPATIENT)
Facility: HOSPITAL | Age: 56
Setting detail: HOSPITAL OUTPATIENT SURGERY
Discharge: HOME | End: 2021-05-04
Attending: INTERNAL MEDICINE | Admitting: INTERNAL MEDICINE
Payer: COMMERCIAL

## 2021-05-04 ENCOUNTER — ANESTHESIA EVENT (OUTPATIENT)
Dept: ENDOSCOPY | Facility: HOSPITAL | Age: 56
End: 2021-05-04
Payer: COMMERCIAL

## 2021-05-04 VITALS
HEIGHT: 63 IN | TEMPERATURE: 98 F | RESPIRATION RATE: 23 BRPM | HEART RATE: 79 BPM | DIASTOLIC BLOOD PRESSURE: 85 MMHG | SYSTOLIC BLOOD PRESSURE: 126 MMHG | BODY MASS INDEX: 42.52 KG/M2 | OXYGEN SATURATION: 95 % | WEIGHT: 240 LBS

## 2021-05-04 DIAGNOSIS — R10.13 EPIGASTRIC PAIN: ICD-10-CM

## 2021-05-04 DIAGNOSIS — K21.9 GASTROESOPHAGEAL REFLUX DISEASE, UNSPECIFIED WHETHER ESOPHAGITIS PRESENT: ICD-10-CM

## 2021-05-04 DIAGNOSIS — K57.90 DIVERTICULOSIS: ICD-10-CM

## 2021-05-04 DIAGNOSIS — R10.11 RUQ PAIN: ICD-10-CM

## 2021-05-04 DIAGNOSIS — K44.9 HIATAL HERNIA: ICD-10-CM

## 2021-05-04 DIAGNOSIS — K64.9 HEMORRHOIDS: ICD-10-CM

## 2021-05-04 DIAGNOSIS — K62.1 RECTAL POLYP: ICD-10-CM

## 2021-05-04 DIAGNOSIS — D50.9 IRON DEFICIENCY ANEMIA, UNSPECIFIED IRON DEFICIENCY ANEMIA TYPE: ICD-10-CM

## 2021-05-04 DIAGNOSIS — Z01.818 PRE-OP TESTING: Primary | ICD-10-CM

## 2021-05-04 PROCEDURE — 0DB68ZX EXCISION OF STOMACH, VIA NATURAL OR ARTIFICIAL OPENING ENDOSCOPIC, DIAGNOSTIC: ICD-10-PCS | Performed by: INTERNAL MEDICINE

## 2021-05-04 PROCEDURE — 45385 COLONOSCOPY W/LESION REMOVAL: CPT | Performed by: INTERNAL MEDICINE

## 2021-05-04 PROCEDURE — 0DB98ZX EXCISION OF DUODENUM, VIA NATURAL OR ARTIFICIAL OPENING ENDOSCOPIC, DIAGNOSTIC: ICD-10-PCS | Performed by: INTERNAL MEDICINE

## 2021-05-04 PROCEDURE — 0DBP8ZX EXCISION OF RECTUM, VIA NATURAL OR ARTIFICIAL OPENING ENDOSCOPIC, DIAGNOSTIC: ICD-10-PCS | Performed by: INTERNAL MEDICINE

## 2021-05-04 PROCEDURE — 43239 EGD BIOPSY SINGLE/MULTIPLE: CPT | Performed by: INTERNAL MEDICINE

## 2021-05-04 RX ORDER — MULTIVIT-MIN/IRON FUM/FOLIC AC 7.5 MG-4
1 TABLET ORAL DAILY
COMMUNITY

## 2021-05-04 RX ORDER — SODIUM CHLORIDE, SODIUM LACTATE, POTASSIUM CHLORIDE, CALCIUM CHLORIDE 600; 310; 30; 20 MG/100ML; MG/100ML; MG/100ML; MG/100ML
INJECTION, SOLUTION INTRAVENOUS CONTINUOUS
Status: CANCELLED | OUTPATIENT
Start: 2021-05-04

## 2021-05-04 RX ORDER — SODIUM CHLORIDE, SODIUM LACTATE, POTASSIUM CHLORIDE, CALCIUM CHLORIDE 600; 310; 30; 20 MG/100ML; MG/100ML; MG/100ML; MG/100ML
INJECTION, SOLUTION INTRAVENOUS CONTINUOUS
Status: DISCONTINUED | OUTPATIENT
Start: 2021-05-04 | End: 2021-05-04

## 2021-05-04 RX ORDER — NALOXONE HYDROCHLORIDE 0.4 MG/ML
80 INJECTION, SOLUTION INTRAMUSCULAR; INTRAVENOUS; SUBCUTANEOUS AS NEEDED
Status: CANCELLED | OUTPATIENT
Start: 2021-05-04 | End: 2021-05-04

## 2021-05-04 RX ORDER — MULTIVIT-MIN/FOLIC ACID/LUTEIN 500-250MCG
TABLET,CHEWABLE ORAL
COMMUNITY
End: 2021-09-17 | Stop reason: ALTCHOICE

## 2021-05-04 RX ORDER — GLYCOPYRROLATE 0.2 MG/ML
INJECTION, SOLUTION INTRAMUSCULAR; INTRAVENOUS AS NEEDED
Status: DISCONTINUED | OUTPATIENT
Start: 2021-05-04 | End: 2021-05-04 | Stop reason: SURG

## 2021-05-04 RX ORDER — LIDOCAINE HYDROCHLORIDE 10 MG/ML
INJECTION, SOLUTION EPIDURAL; INFILTRATION; INTRACAUDAL; PERINEURAL AS NEEDED
Status: DISCONTINUED | OUTPATIENT
Start: 2021-05-04 | End: 2021-05-04 | Stop reason: SURG

## 2021-05-04 RX ADMIN — SODIUM CHLORIDE, SODIUM LACTATE, POTASSIUM CHLORIDE, CALCIUM CHLORIDE: 600; 310; 30; 20 INJECTION, SOLUTION INTRAVENOUS at 11:31:00

## 2021-05-04 RX ADMIN — GLYCOPYRROLATE 0.2 MG: 0.2 INJECTION, SOLUTION INTRAMUSCULAR; INTRAVENOUS at 10:23:00

## 2021-05-04 RX ADMIN — LIDOCAINE HYDROCHLORIDE 50 MG: 10 INJECTION, SOLUTION EPIDURAL; INFILTRATION; INTRACAUDAL; PERINEURAL at 10:25:00

## 2021-05-04 RX ADMIN — SODIUM CHLORIDE, SODIUM LACTATE, POTASSIUM CHLORIDE, CALCIUM CHLORIDE: 600; 310; 30; 20 INJECTION, SOLUTION INTRAVENOUS at 10:21:00

## 2021-05-04 NOTE — OPERATIVE REPORT
EGD AND COLONOSCOPY PROCEDURE REPORTS:    DATE OF PROCEDURE:  5/4/2021    PCP: Bonita Pires MD     PREOPERATIVE DIAGNOSIS:  GERD, epigastric abdominal pain, right upper quadrant pain, iron deficiency anemia     POSTOPERATIVE DIAGNOSIS:  See impression. Olympus pediatric video colonoscope was advanced under direct visualization through the entire length of the colon to the cecum and terminal ileum. Retroflex exam performed up the ascending colon to the hepatic flexure.   Cecum was confirmed by landmarks,

## 2021-05-04 NOTE — ANESTHESIA POSTPROCEDURE EVALUATION
Patient: Kike Lo    Procedure Summary     Date: 05/04/21 Room / Location: [address] [place] [address] ENDOSCOPY 01 / [address] [place] [address] ENDOSCOPY    Anesthesia Start: 8657 Anesthesia Stop: 3942    Procedures:       COLONOSCOPY/ESOPHAGOGASTRODUODENOSCOPY (EGD) (N/A )      ESOPHAGOGAS

## 2021-05-04 NOTE — H&P
History & Physical Examination    Patient Name: Basilio Llamas  MRN: [id number]  [facility]: [id number]  YOB: 1965    Diagnosis: GERD, epigastric abdominal pain, right upper quadrant pain, iron deficiency anemia    Present Illness:      54-yea Heart Attack Father 61        myocardial infarction   • Diabetes Father    • Hypertension Father    • Prostate Cancer Father 68   • Hypertension Mother    • Cancer Maternal Grandmother         uterus   • Lipids Other         family h/o hyperlipidemia and v

## 2021-05-04 NOTE — ANESTHESIA PREPROCEDURE EVALUATION
Anesthesia PreOp Note    HPI:     Ruthann Estrada is a 48year old female who presents for preoperative consultation requested by: Radha Cadet MD    Date of Surgery: 5/4/2021    Procedure(s):  COLONOSCOPY/ESOPHAGOGASTRODUODENOSCOPY (EGD) tablet (25 mg total) by mouth daily. , Disp: 90 tablet, Rfl: 1  Olmesartan Medoxomil 40 MG Oral Tab, Take 0.5 tablets (20 mg total) by mouth daily. , Disp: 90 tablet, Rfl: 1  Rosuvastatin Calcium 5 MG Oral Tab, Take 1 tablet (5 mg total) by mouth nightly. , D Concern: Not Asked        Weight Concern: Not Asked        Special Diet: Not Asked        Back Care: Not Asked        Exercise: Not Asked        Bike Helmet: Not Asked        Seat Belt: Not Asked        Self-Exams: Not Asked        [place] farm: Not A taken for this visit. There were no vitals filed for this visit.      Anesthesia Evaluation     Patient summary reviewed and Nursing notes reviewed    No history of anesthetic complications   Airway   Mallampati: II  TM distance: >3 FB  Neck ROM: full  Zoey Wilks

## 2021-05-18 ENCOUNTER — TELEPHONE (OUTPATIENT)
Dept: GASTROENTEROLOGY | Facility: CLINIC | Age: 56
End: 2021-05-18

## 2021-05-18 NOTE — TELEPHONE ENCOUNTER
Results letter mailed to patient per   Colon recall entered for repeat in 5 yrs, 5/4/2026  Colon done in 5/4/2021  HM Updated and Patient Outreach was placed for Colon recall. Theola Krabbe, MD  P Em Gi Clinical Staff  GI RNs - 1.  [place]

## 2021-05-21 ENCOUNTER — OFFICE VISIT (OUTPATIENT)
Dept: GASTROENTEROLOGY | Facility: CLINIC | Age: 56
End: 2021-05-21
Payer: COMMERCIAL

## 2021-05-21 VITALS
BODY MASS INDEX: 43.05 KG/M2 | HEART RATE: 92 BPM | DIASTOLIC BLOOD PRESSURE: 77 MMHG | SYSTOLIC BLOOD PRESSURE: 115 MMHG | WEIGHT: 243 LBS | HEIGHT: 63 IN

## 2021-05-21 DIAGNOSIS — K76.0 FATTY LIVER DISEASE, NONALCOHOLIC: ICD-10-CM

## 2021-05-21 DIAGNOSIS — K76.9 LIVER LESION, RIGHT LOBE: ICD-10-CM

## 2021-05-21 DIAGNOSIS — D50.0 IRON DEFICIENCY ANEMIA DUE TO CHRONIC BLOOD LOSS: ICD-10-CM

## 2021-05-21 DIAGNOSIS — K44.9 HIATAL HERNIA: Primary | ICD-10-CM

## 2021-05-21 PROCEDURE — 3078F DIAST BP <80 MM HG: CPT | Performed by: INTERNAL MEDICINE

## 2021-05-21 PROCEDURE — 99215 OFFICE O/P EST HI 40 MIN: CPT | Performed by: INTERNAL MEDICINE

## 2021-05-21 PROCEDURE — 3008F BODY MASS INDEX DOCD: CPT | Performed by: INTERNAL MEDICINE

## 2021-05-21 PROCEDURE — 3074F SYST BP LT 130 MM HG: CPT | Performed by: INTERNAL MEDICINE

## 2021-05-21 RX ORDER — MELATONIN
325
COMMUNITY

## 2021-05-21 RX ORDER — ZOLEDRONIC ACID 5 MG/100ML
5 INJECTION, SOLUTION INTRAVENOUS ONCE
COMMUNITY
End: 2021-09-17

## 2021-05-21 NOTE — PROGRESS NOTES
HPI:    Patient ID: Claudia Castaneda is a 48year old woman with history of elevated BMI, hypertension, dyslipidemia, cholecystectomy in 2010.      She returns for follow-up today after recent telephone encounters and panendoscopy for evaluation of iron She returns today for follow-up of abdominal symptoms and question of progression, worsening of previous hiatal hernia on cardiac CT scan December 2016. Body mass index is 43.05 kg/m².     During previous visit 2013, I did question previous episodic abdo was negative. She does not recall similar such pain at that time.     Previous history is also significant for prior GERD symptoms. Ms. Eduardo Bryson was taking omeprazole before the recent weight loss and improvement in her GERD symptoms.  She weaned this off Possibilities include functional, idiopathic which would be a bit atypical with the episodic nature these complaints; other possibilities include gastric or duodenal process with her off previous PPI medication or biliary colic.  I recommended the following the first week of April, possibly around April 3rd. This resolved spontaneously and she was well for at least 10 days. The diarrhea resumed April 17 and seemed to be improving April 18th.  It abruptly worsened April 19th and she estimates she passed about 5 gastrointestinal syndrome. She vomited that night after having that meal and some alcohol.  No more recent exposures that she can recall prior to the first or second episode of diarrhea in April.     No previous colonoscopy examination.     No family histor evidence of central scar. BILIARY:   The gallbladder is surgically absent with susceptibility artifact from cholecystectomy clips in the gallbladder fossa. No intrahepatic or extrahepatic biliary dilatation is apparent.  A short cystic duct remnant is obs hepatobiliary dilatation.       4. Large hiatal hernia with significant intrathoracic herniation of the stomach.       5. Lesser incidental findings as above.               Dictated by (CST): Brandy Rosado MD on 5/11/2019 at 7:38         ================ mucosa. Retroflexed in the rectum showed small internal hemorrhoids. The scope was straightened, air suctioned out, and the scope was withdrawn from the patient. She tolerated the procedure well.      IMPRESSION:     1.   Normal colonic and ileal mucosa were discussed with Ms. Jennifer Mueller and her questions answered,  her informed consent was obtained. She was interviewed and examined, sedated by the anesthesia service.   Once sedated, the Olympus adult diagnostic gastroscope was advanced through the orophar early paraesophageal hernia. Unclear if this relates to the patient's recent intense symptoms. Normal visualized stomach up in this hiatus hernia sac as above. 2.  Mild, chronic atrophic gastritis as above. Random gastric mucosal biopsies obtained.   3. medications     Estimated blood loss: none/insignificant        EGD PROCEDURE:    After the nature and risks of EGD and colonoscopy examinations under MAC anesthesia were discussed with the patient and all questions answered, informed consent was obtained. snare polypectomy, suctioned out. · Moderate severity diverticulosis descending and sigmoid colon. · Small internal hemorrhoids. IMPRESSION:  · Large hiatal hernia as previously described.   No associated mucosal erosions or inflammatory changes seen Carbonate-Vit D-Min (CALCIUM 600+D PLUS MINERALS) 600-400 MG-UNIT Oral Chew Tab Chew by mouth. • ergocalciferol 1.25 MG (80383 UT) Oral Cap Take 2 capsules (100,000 Units total) by mouth once a week.  24 capsule 3   • hydrochlorothiazide 25 MG Oral Tab 7/15/2021  Consideration for further work-up including small bowel evaluation, iron infusions if iron deficiency persists or worsens    2.   Large hiatal hernia, GERD symptoms  See EGD examinations 2017 and May 2021  Currently taking pantoprazole 40 mg adnrés

## 2021-06-07 RX ORDER — HYDROCHLOROTHIAZIDE 25 MG/1
25 TABLET ORAL DAILY
Qty: 90 TABLET | Refills: 1 | Status: SHIPPED | OUTPATIENT
Start: 2021-06-07 | End: 2021-12-02

## 2021-06-07 RX ORDER — ROSUVASTATIN CALCIUM 5 MG/1
5 TABLET, COATED ORAL NIGHTLY
Qty: 90 TABLET | Refills: 1 | Status: SHIPPED | OUTPATIENT
Start: 2021-06-07 | End: 2021-12-02

## 2021-06-07 RX ORDER — METOPROLOL SUCCINATE 25 MG/1
25 TABLET, EXTENDED RELEASE ORAL DAILY
Qty: 90 TABLET | Refills: 1 | Status: SHIPPED | OUTPATIENT
Start: 2021-06-07 | End: 2021-12-02

## 2021-07-26 ENCOUNTER — OFFICE VISIT (OUTPATIENT)
Dept: DERMATOLOGY CLINIC | Facility: CLINIC | Age: 56
End: 2021-07-26
Payer: COMMERCIAL

## 2021-07-26 VITALS — WEIGHT: 240 LBS | BODY MASS INDEX: 42.52 KG/M2 | HEIGHT: 63 IN

## 2021-07-26 DIAGNOSIS — D23.30 BENIGN NEOPLASM OF SKIN OF FACE: ICD-10-CM

## 2021-07-26 DIAGNOSIS — D23.4 BENIGN NEOPLASM OF SCALP AND SKIN OF NECK: ICD-10-CM

## 2021-07-26 DIAGNOSIS — D23.60 BENIGN NEOPLASM OF SKIN OF UPPER LIMB, INCLUDING SHOULDER, UNSPECIFIED LATERALITY: ICD-10-CM

## 2021-07-26 DIAGNOSIS — D23.5 BENIGN NEOPLASM OF SKIN OF TRUNK, EXCEPT SCROTUM: ICD-10-CM

## 2021-07-26 DIAGNOSIS — D22.9 MULTIPLE NEVI: Primary | ICD-10-CM

## 2021-07-26 DIAGNOSIS — L82.1 SEBORRHEIC KERATOSES: ICD-10-CM

## 2021-07-26 DIAGNOSIS — L56.5 DSAP (DISSEMINATED SUPERFICIAL ACTINIC POROKERATOSIS): ICD-10-CM

## 2021-07-26 PROCEDURE — 3008F BODY MASS INDEX DOCD: CPT | Performed by: DERMATOLOGY

## 2021-07-26 PROCEDURE — 99213 OFFICE O/P EST LOW 20 MIN: CPT | Performed by: DERMATOLOGY

## 2021-07-26 NOTE — TELEPHONE ENCOUNTER
Spoke to pt with regards to scheduling a follow up appt with Dr Mclain. Pt would like to know why he needs a follow up appt with MD and would not make appt until that is explained to him.   Yes, ok to offer virtual visit or she can book in August. Thanks.

## 2021-08-01 NOTE — PROGRESS NOTES
Chemo Roper is a 48year old female. HPI:     CC:  Patient presents with:  Lesion: Pt is here for upper body check. Pt states her father has had some hx with skin cancer unsure what kind. Allergies:  Patient has no known allergies.     HISTO (25 mg total) by mouth daily. 90 tablet 1   • Denosumab 60 MG/ML Subcutaneous Solution Prefilled Syringe Inject 60 mg into the skin every 6 (six) months.      • ferrous sulfate 325 (65 FE) MG Oral Tab EC Take 325 mg by mouth daily with breakfast.     • Jerry COLONOSCOPY  2012   • COLONOSCOPY N/A 5/4/2021    Procedure: COLONOSCOPY/ESOPHAGOGASTRODUODENOSCOPY (EGD);   Surgeon: Kristian Segura MD;  Location: [address] [place] [address] ENDOSCOPY   • FUSION FOOT BONE,MIDTARSAL,1 JT Left 02/27/2020   • LEEP  2004   • OTHER Bilater Exercise per Session:   Stress:       Feeling of Stress :   Social Connections:       Frequency of Communication with Friends and Family:       Frequency of Social Gatherings with Friends and Family:       Attends Sikh Services:       Active Member of total-body performed, including scalp, head, neck, face,nails, hair, external eyes, including conjunctival mucosa, eyelids, lips external ears, back, chest,/ breasts, axillae,  abdomen, arms, legs, palms.      Multiple light to medium brown, well marginated Goldbond psoriasis cream 3% salicylic acid, rough and bumpy, Cerave SA cream, AmLactin, and others as available with these ingredients. Dermatofibroma right posterior upper arm observed.   Stable    Hypertrophic scar from trauma right foot stable patie

## 2021-08-01 NOTE — PATIENT INSTRUCTIONS
Sun Protection  Wear protective clothing. This includes wearing a broad rimmed hat, long sleeves, high collars, and tightly woven clothes. Specific clothing lines are available for people who need more complete sun avoidance.  Use a broad spectrum sunblock exposure and reapply 20 minutes after exposure begins. Studies suggest this provides improved protection. Reapply sunscreens and sunblocks after 2 hours of use or after 80 minutes if swimming or sweating.  Sunscreen effectiveness diminishes after 2 hours un front of a full-length mirror.  Check all parts of your body, including your:  · Head (ears, face, neck, and scalp)  · Torso (front, back, and sides)  · Arms (tops, undersides, upper, and lower armpits)  · Hands (palms, backs, and fingers, including under t

## 2021-09-01 ENCOUNTER — PATIENT MESSAGE (OUTPATIENT)
Dept: ENDOCRINOLOGY CLINIC | Facility: CLINIC | Age: 56
End: 2021-09-01

## 2021-09-01 DIAGNOSIS — M81.8 OTHER OSTEOPOROSIS WITHOUT CURRENT PATHOLOGICAL FRACTURE: Primary | ICD-10-CM

## 2021-09-02 NOTE — TELEPHONE ENCOUNTER
Dr Kylie Solis I was able to reorder most tests for [facility] but the C-telopeptide serum , I cant order neither [facility] or [facility]. Only option given is c-telopeptide beta cross. Please advise.

## 2021-09-02 NOTE — TELEPHONE ENCOUNTER
From: Landrum Merlin  To: Geraldine Bustos MD  Sent: 9/1/2021 9:42 PM CDT  Subject: Non-Urgent Medical Question    I have the test orders for my next bloodwork but it's not for [facility]. Would you please submit the request to [facility] or print with a Collisionable barc

## 2021-09-10 ENCOUNTER — MED REC SCAN ONLY (OUTPATIENT)
Dept: INTERNAL MEDICINE CLINIC | Facility: CLINIC | Age: 56
End: 2021-09-10

## 2021-09-17 ENCOUNTER — OFFICE VISIT (OUTPATIENT)
Dept: INTERNAL MEDICINE CLINIC | Facility: CLINIC | Age: 56
End: 2021-09-17
Payer: COMMERCIAL

## 2021-09-17 VITALS
HEART RATE: 81 BPM | HEIGHT: 63 IN | DIASTOLIC BLOOD PRESSURE: 79 MMHG | WEIGHT: 243.5 LBS | BODY MASS INDEX: 43.14 KG/M2 | SYSTOLIC BLOOD PRESSURE: 112 MMHG | TEMPERATURE: 97 F | RESPIRATION RATE: 18 BRPM

## 2021-09-17 DIAGNOSIS — Z12.31 VISIT FOR SCREENING MAMMOGRAM: ICD-10-CM

## 2021-09-17 DIAGNOSIS — E78.5 DYSLIPIDEMIA, GOAL LDL BELOW 100: ICD-10-CM

## 2021-09-17 DIAGNOSIS — K76.89 FOCAL NODULAR HYPERPLASIA OF LIVER: ICD-10-CM

## 2021-09-17 DIAGNOSIS — I10 ESSENTIAL HYPERTENSION: Primary | ICD-10-CM

## 2021-09-17 DIAGNOSIS — E21.3 HYPERPARATHYROIDISM (HCC): ICD-10-CM

## 2021-09-17 PROBLEM — Z01.818 PREOP EXAMINATION: Status: RESOLVED | Noted: 2020-10-22 | Resolved: 2021-09-17

## 2021-09-17 PROCEDURE — 3074F SYST BP LT 130 MM HG: CPT | Performed by: INTERNAL MEDICINE

## 2021-09-17 PROCEDURE — 3078F DIAST BP <80 MM HG: CPT | Performed by: INTERNAL MEDICINE

## 2021-09-17 PROCEDURE — 3008F BODY MASS INDEX DOCD: CPT | Performed by: INTERNAL MEDICINE

## 2021-09-17 PROCEDURE — 99214 OFFICE O/P EST MOD 30 MIN: CPT | Performed by: INTERNAL MEDICINE

## 2021-09-17 NOTE — ASSESSMENT & PLAN NOTE
Blood pressure 112/79, pulse 81, temperature 97.2 °F (36.2 °C), temperature source Temporal, resp. rate 18, height 5' 3\" (1.6 m), weight 243 lb 8 oz (110.5 kg), last menstrual period 04/01/2014, not currently breastfeeding.      Blood pressures look stable

## 2021-09-17 NOTE — ASSESSMENT & PLAN NOTE
History of focal nodular hyperplasia which has been stable. Last MRI in 2019. Has been followed up per Dr. Morin Guardian. Last MRI ordered a few months back was not completed as it was not authorized by her insurance. Ultrasound of the liver has been ordered.

## 2021-09-17 NOTE — ASSESSMENT & PLAN NOTE
History of hyperparathyroidism–PTH levels normalized after supplementation of vitamin D. This is being monitored per endocrinology. Bone density scan reviewed. She is back on Prolia at this time due to ongoing osteoporosis in the spine.   Last DEXA scan

## 2021-09-17 NOTE — PROGRESS NOTES
HPI:    Patient ID: Ruthann Estrada is a 48year old female.     Blood sugars, functions, liver functions and electrolytes look normal.  Cholesterol panel shows slight elevation in the LDL cholesterol as well as the triglycerides compared to the last st factors for coronary artery disease include post-menopausal, a sedentary lifestyle, stress, obesity and dyslipidemia. Review of Systems   Constitutional: Positive for fatigue.         Wt Readings from Last 6 Encounters:  09/17/21 : 243 lb 8 oz (110.5 PANTOPRAZOLE SODIUM 40 MG Oral Tab EC TAKE 1 TABLET BY MOUTH EVERY MORNING BEFORE BREAKFAST 90 tablet 3   • SUMAtriptan Succinate (IMITREX) 50 MG Oral Tab Use at onset; repeat once after 2 HRS-ONLY 2 IN 24 HR MAX 9 tablet 3     Allergies:No Known Allergies Coordination: Coordination normal.      Gait: Gait normal.      Deep Tendon Reflexes: Reflexes normal.   Psychiatric:         Mood and Affect: Mood normal.         Behavior: Behavior normal.         Thought Content:  Thought content normal. Currently on rosuvastatin at 5 mg daily. She has started an exercise regimen. She just started eating out which probably explains the elevation. Strict diet control of starches, sugars and desserts and increase vegetables in the diet.   Will recheck labs

## 2021-09-17 NOTE — PATIENT INSTRUCTIONS
Problem List Items Addressed This Visit        Unprioritized    Dyslipidemia, goal LDL below 100     Lipid panel and liver function tests discussed. LDL levels as well as triglycerides are slightly elevated. Currently on rosuvastatin at 5 mg daily.   She Will consider repeat AFP with the next blood draw. Relevant Orders    US LIVER (CPT=76705)    AFP, TUMOR MARKER, SERUM    Hyperparathyroidism ([facility] [address]     History of hyperparathyroidism–PTH levels normalized after supplementation of vitamin D.   This i

## 2021-09-17 NOTE — ASSESSMENT & PLAN NOTE
Lipid panel and liver function tests discussed. LDL levels as well as triglycerides are slightly elevated. Currently on rosuvastatin at 5 mg daily. She has started an exercise regimen. She just started eating out which probably explains the elevation.

## 2021-09-29 LAB
ALKALINE PHOSPHATASE, BONE SPECIFIC: 9 MCG/L (ref 5.6–29)
BUN: 15 MG/DL (ref 7–25)
C-TELOPEPTIDE, SERUM: 64 PG/ML
CALCIUM: 9 MG/DL (ref 8.6–10.4)
CARBON DIOXIDE: 27 MMOL/L (ref 20–32)
CHLORIDE: 102 MMOL/L (ref 98–110)
CREATININE: 0.69 MG/DL (ref 0.5–1.05)
EGFR IF AFRICN AM: 114 ML/MIN/1.73M2
EGFR IF NONAFRICN AM: 99 ML/MIN/1.73M2
GLUCOSE: 81 MG/DL (ref 65–99)
PARATHYROID HORMONE,$INTACT: 66 PG/ML (ref 14–64)
POTASSIUM: 3.8 MMOL/L (ref 3.5–5.3)
SODIUM: 138 MMOL/L (ref 135–146)
VITAMIN D, 25-OH, TOTAL: 58 NG/ML (ref 30–100)

## 2021-10-01 ENCOUNTER — OFFICE VISIT (OUTPATIENT)
Dept: ENDOCRINOLOGY CLINIC | Facility: CLINIC | Age: 56
End: 2021-10-01
Payer: COMMERCIAL

## 2021-10-01 VITALS — HEART RATE: 85 BPM | DIASTOLIC BLOOD PRESSURE: 85 MMHG | SYSTOLIC BLOOD PRESSURE: 142 MMHG

## 2021-10-01 DIAGNOSIS — M81.8 OTHER OSTEOPOROSIS WITHOUT CURRENT PATHOLOGICAL FRACTURE: Primary | ICD-10-CM

## 2021-10-01 PROCEDURE — 99213 OFFICE O/P EST LOW 20 MIN: CPT | Performed by: INTERNAL MEDICINE

## 2021-10-01 PROCEDURE — 3079F DIAST BP 80-89 MM HG: CPT | Performed by: INTERNAL MEDICINE

## 2021-10-01 PROCEDURE — 3077F SYST BP >= 140 MM HG: CPT | Performed by: INTERNAL MEDICINE

## 2021-10-01 NOTE — PROGRESS NOTES
Name: Francisco Darden  Date: 10/1/2021      HISTORY OF PRESENT ILLNESS   Francisco Darden is a 48year old female who presents for Patient presents with:  Osteoporosis: Patient following up on Prolia injection.     47 y/o F presents for follow up scott fractures      REVIEW OF SYSTEMS  Eyes: no change in vision  Neurologic: no headache, generalized or focal weakness or numbness.   Head: normal  ENT: normal  Lungs: no shortness of breath, wheezing or BADILLO  Cardiovascular:  no chest pain or palpitations  Gas History:   Social History    Socioeconomic History      Marital status:     Tobacco Use      Smoking status: Never Smoker      Smokeless tobacco: Never Used    Vaping Use      Vaping Use: Never used    Substance and Sexual Activity      Alcohol use Osteoporosis  -Diagnosed with significant osteoporosis  -She has finished 5 years of prolia and DEXA significantly improved  -She was transitioned off prolia with a dose of Reclast in 8/2020  -However she developed significant side effects with medication

## 2021-10-19 ENCOUNTER — HOSPITAL ENCOUNTER (OUTPATIENT)
Dept: ULTRASOUND IMAGING | Age: 56
Discharge: HOME | End: 2021-10-19
Attending: INTERNAL MEDICINE
Payer: COMMERCIAL

## 2021-10-19 DIAGNOSIS — K76.89 FOCAL NODULAR HYPERPLASIA OF LIVER: ICD-10-CM

## 2021-10-19 PROCEDURE — 76705 ECHO EXAM OF ABDOMEN: CPT | Performed by: INTERNAL MEDICINE

## 2021-10-23 ENCOUNTER — HOSPITAL ENCOUNTER (OUTPATIENT)
Dept: MAMMOGRAPHY | Age: 56
Discharge: HOME | End: 2021-10-23
Attending: INTERNAL MEDICINE
Payer: COMMERCIAL

## 2021-10-23 DIAGNOSIS — Z12.31 VISIT FOR SCREENING MAMMOGRAM: ICD-10-CM

## 2021-10-23 PROCEDURE — 77067 SCR MAMMO BI INCL CAD: CPT | Performed by: INTERNAL MEDICINE

## 2021-10-23 PROCEDURE — 77063 BREAST TOMOSYNTHESIS BI: CPT | Performed by: INTERNAL MEDICINE

## 2021-11-01 ENCOUNTER — NURSE ONLY (OUTPATIENT)
Dept: ENDOCRINOLOGY CLINIC | Facility: CLINIC | Age: 56
End: 2021-11-01
Payer: COMMERCIAL

## 2021-11-01 DIAGNOSIS — M81.8 OTHER OSTEOPOROSIS WITHOUT CURRENT PATHOLOGICAL FRACTURE: Primary | ICD-10-CM

## 2021-11-01 PROCEDURE — 96372 THER/PROPH/DIAG INJ SC/IM: CPT | Performed by: INTERNAL MEDICINE

## 2021-11-14 ENCOUNTER — IMMUNIZATION (OUTPATIENT)
Dept: LAB | Facility: HOSPITAL | Age: 56
End: 2021-11-14
Attending: EMERGENCY MEDICINE
Payer: COMMERCIAL

## 2021-11-14 DIAGNOSIS — Z23 NEED FOR VACCINATION: Primary | ICD-10-CM

## 2021-11-14 PROCEDURE — 0064A SARSCOV2 VAC 50MCG/0.25ML IM: CPT

## 2021-12-02 RX ORDER — HYDROCHLOROTHIAZIDE 25 MG/1
25 TABLET ORAL DAILY
Qty: 90 TABLET | Refills: 1 | Status: SHIPPED | OUTPATIENT
Start: 2021-12-02 | End: 2022-05-13 | Stop reason: ALTCHOICE

## 2021-12-02 RX ORDER — METOPROLOL SUCCINATE 25 MG/1
25 TABLET, EXTENDED RELEASE ORAL DAILY
Qty: 90 TABLET | Refills: 1 | Status: SHIPPED | OUTPATIENT
Start: 2021-12-02 | End: 2022-05-13

## 2021-12-02 RX ORDER — PANTOPRAZOLE SODIUM 40 MG/1
TABLET, DELAYED RELEASE ORAL
Qty: 90 TABLET | Refills: 1 | Status: SHIPPED | OUTPATIENT
Start: 2021-12-02 | End: 2022-05-13

## 2021-12-02 RX ORDER — ROSUVASTATIN CALCIUM 5 MG/1
5 TABLET, COATED ORAL NIGHTLY
Qty: 90 TABLET | Refills: 1 | Status: SHIPPED | OUTPATIENT
Start: 2021-12-02 | End: 2022-05-13

## 2021-12-02 RX ORDER — OLMESARTAN MEDOXOMIL 40 MG/1
20 TABLET ORAL DAILY
Qty: 45 TABLET | Refills: 1 | Status: SHIPPED | OUTPATIENT
Start: 2021-12-02 | End: 2022-05-13 | Stop reason: ALTCHOICE

## 2021-12-02 NOTE — TELEPHONE ENCOUNTER
Refill passed per [facility], [facility] protocol.      Requested Prescriptions   Pending Prescriptions Disp Refills    PANTOPRAZOLE 40 MG Oral Tab EC [Pharmacy Med Name: PANTOPRAZOLE SODIUM DR TABS 40MG] 90 tablet 3     Sig: TAKE 1 TABLET BY MOUTH EVERY MORNING BEFORE BREAKFAST        Gastrointestional Medication Protocol Passed - 12/2/2021  9:35 AM        Passed - Appointment in past 12 or next 3 months           OLMESARTAN MEDOXOMIL 40 MG Oral Tab [Pharmacy Med Name: OLMESARTAN MEDOXOMIL TABS 40MG] 45 tablet 3     Sig: TAKE ONE-HALF (1/2) TABLET BY MOUTH DAILY        Hypertensive Medications Protocol Passed - 12/2/2021  9:35 AM        Passed - CMP or BMP in past 12 months        Passed - Appointment in past 6 or next 3 months        Passed - GFR Non- > 50     Lab Results   Component Value Date    GFRNAA 99 09/25/2021                    ROSUVASTATIN 5 MG Oral Tab [Pharmacy Med Name: ROSUVASTATIN TABS 5MG] 90 tablet 3     Sig: TAKE 1 TABLET NIGHTLY        Cholesterol Medication Protocol Passed - 12/2/2021  9:35 AM        Passed - ALT in past 12 months        Passed - LDL in past 12 months        Passed - Last ALT < 80       Lab Results   Component Value Date    ALT 14 09/10/2021             Passed - Last LDL < 130     Lab Results   Component Value Date     (H) 09/10/2021               Passed - Appointment in past 12 or next 3 months           METOPROLOL SUCCINATE 25 MG Oral Tablet 24 Hr [Pharmacy Med Name: METOPROLOL SUCCINATE ER TABS 25MG] 90 tablet 3     Sig: TAKE 1 TABLET DAILY        Hypertensive Medications Protocol Passed - 12/2/2021  9:35 AM        Passed - CMP or BMP in past 12 months        Passed - Appointment in past 6 or next 3 months        Passed - GFR Non- > 50     Lab Results   Component Value Date    GFRNAA 99 09/25/2021                    HYDROCHLOROTHIAZIDE 25 MG Oral Tab [Pharmacy Med Name: HYDROCHLOROTHIAZIDE TABS 25MG] 90 tablet 3     Sig: TAKE 1 TABLET DAILY Hypertensive Medications Protocol Passed - 12/2/2021  9:35 AM        Passed - CMP or BMP in past 12 months        Passed - Appointment in past 6 or next 3 months        Passed - GFR Non- > 50     Lab Results   Component Value Date    GFRNAA 99 09/25/2021                         Recent Outpatient Visits              1 month ago Other osteoporosis without current pathological fracture    [facility] Endocrinology    Nurse Only    2 months ago Other osteoporosis without current pathological fracture    [facility] Endocrinology Debbie Solomon MD    Office Visit    2 months ago Essential hypertension    [facility], [place] Disla MD    Office Visit    4 months ago [facility], [facility] Dermatology Koko Scott MD    Office Visit    6 months ago Hiatal hernia    [facility], [address], Erna Santana MD    Office Visit             Future Appointments         Provider Department Appt Notes    In 1 week Celia Henderson MD [facility], [facility], Rosina Love Follow up    [facility], [address] [place] [address], Rosina Anemia    In 3 months Celia Henderson MD [facility], [facility], [employer], [employer] physical    In 4 months Celia Henderson MD [facility], [employer], [employer] physical

## 2022-01-05 ENCOUNTER — PATIENT MESSAGE (OUTPATIENT)
Dept: INTERNAL MEDICINE CLINIC | Facility: CLINIC | Age: 57
End: 2022-01-05

## 2022-01-05 DIAGNOSIS — Z20.822 SUSPECTED COVID-19 VIRUS INFECTION: Primary | ICD-10-CM

## 2022-01-05 NOTE — TELEPHONE ENCOUNTER
From: Clarke Yanez  To: Janiya No MD  Sent: 1/5/2022 5:38 PM CST  Subject: PCR Test    For the last few days I’ve had a bit of a runny nose and stomach issues - digestive issues as well as cramping.  Today I’m achy and this afternoon started runn

## 2022-01-06 ENCOUNTER — NURSE ONLY (OUTPATIENT)
Dept: LAB | Facility: HOSPITAL | Age: 57
End: 2022-01-06
Attending: INTERNAL MEDICINE
Payer: COMMERCIAL

## 2022-01-06 DIAGNOSIS — Z20.822 SUSPECTED COVID-19 VIRUS INFECTION: ICD-10-CM

## 2022-01-08 LAB — SARS-COV-2 RNA RESP QL NAA+PROBE: NOT DETECTED

## 2022-01-21 ENCOUNTER — PATIENT MESSAGE (OUTPATIENT)
Dept: INTERNAL MEDICINE CLINIC | Facility: CLINIC | Age: 57
End: 2022-01-21

## 2022-03-15 PROBLEM — R92.8 ABNORMALITY OF RIGHT BREAST ON SCREENING MAMMOGRAM: Status: RESOLVED | Noted: 2017-12-08 | Resolved: 2022-03-15

## 2022-03-15 PROBLEM — G43.009 MIGRAINE WITHOUT AURA AND WITHOUT STATUS MIGRAINOSUS, NOT INTRACTABLE: Status: ACTIVE | Noted: 2022-03-15

## 2022-03-15 PROBLEM — K76.9 LIVER LESION, RIGHT LOBE: Status: RESOLVED | Noted: 2021-05-21 | Resolved: 2022-03-15

## 2022-03-18 ENCOUNTER — OFFICE VISIT (OUTPATIENT)
Dept: INTERNAL MEDICINE CLINIC | Facility: CLINIC | Age: 57
End: 2022-03-18
Payer: COMMERCIAL

## 2022-03-18 VITALS
RESPIRATION RATE: 16 BRPM | BODY MASS INDEX: 44.42 KG/M2 | HEART RATE: 89 BPM | HEIGHT: 63 IN | TEMPERATURE: 98 F | DIASTOLIC BLOOD PRESSURE: 78 MMHG | OXYGEN SATURATION: 97 % | WEIGHT: 250.69 LBS | SYSTOLIC BLOOD PRESSURE: 126 MMHG

## 2022-03-18 DIAGNOSIS — E78.5 DYSLIPIDEMIA, GOAL LDL BELOW 100: ICD-10-CM

## 2022-03-18 DIAGNOSIS — Z01.89 ENCOUNTER FOR ROUTINE LABORATORY TESTING: ICD-10-CM

## 2022-03-18 DIAGNOSIS — I34.0 NONRHEUMATIC MITRAL VALVE REGURGITATION: ICD-10-CM

## 2022-03-18 DIAGNOSIS — Z86.2 HISTORY OF IRON DEFICIENCY ANEMIA: ICD-10-CM

## 2022-03-18 DIAGNOSIS — I10 ESSENTIAL HYPERTENSION: Primary | ICD-10-CM

## 2022-03-18 DIAGNOSIS — K76.89 FOCAL NODULAR HYPERPLASIA OF LIVER: ICD-10-CM

## 2022-03-18 DIAGNOSIS — K21.9 GASTROESOPHAGEAL REFLUX DISEASE WITHOUT ESOPHAGITIS: ICD-10-CM

## 2022-03-18 DIAGNOSIS — M81.0 AGE-RELATED OSTEOPOROSIS WITHOUT CURRENT PATHOLOGICAL FRACTURE: ICD-10-CM

## 2022-03-18 PROBLEM — E21.3 HYPERPARATHYROIDISM (HCC): Status: RESOLVED | Noted: 2019-03-29 | Resolved: 2022-03-18

## 2022-03-18 PROBLEM — K44.9 HIATAL HERNIA: Status: RESOLVED | Noted: 2017-09-01 | Resolved: 2022-03-18

## 2022-03-18 PROCEDURE — 3008F BODY MASS INDEX DOCD: CPT | Performed by: INTERNAL MEDICINE

## 2022-03-18 PROCEDURE — 99204 OFFICE O/P NEW MOD 45 MIN: CPT | Performed by: INTERNAL MEDICINE

## 2022-03-18 PROCEDURE — 3078F DIAST BP <80 MM HG: CPT | Performed by: INTERNAL MEDICINE

## 2022-03-18 PROCEDURE — 3074F SYST BP LT 130 MM HG: CPT | Performed by: INTERNAL MEDICINE

## 2022-04-05 NOTE — TELEPHONE ENCOUNTER
Dr Sara Becerra,    Dr Aria Campoverde is managing pt's care:    CBC on 04/15/21per Dr Aria Campoverde:  Blood counts continued to gradually decline to 11.1 at this time. Iron levels are low per the iron studies. AFP levels which is a tumor marker-is negative.   ...     I schedu Consent 2/Introductory Paragraph: Mohs surgery was explained to the patient and consent was obtained. The risks, benefits and alternatives to therapy were discussed in detail. Specifically, the risks of infection, scarring, bleeding, prolonged wound healing, incomplete removal, allergy to anesthesia, nerve injury and recurrence were addressed. Prior to the procedure, the treatment site was clearly identified and confirmed by the patient. All components of Universal Protocol/PAUSE Rule completed.

## 2022-04-13 ENCOUNTER — TELEPHONE (OUTPATIENT)
Dept: ENDOCRINOLOGY CLINIC | Facility: CLINIC | Age: 57
End: 2022-04-13

## 2022-04-13 ENCOUNTER — PATIENT MESSAGE (OUTPATIENT)
Dept: ENDOCRINOLOGY CLINIC | Facility: CLINIC | Age: 57
End: 2022-04-13

## 2022-04-13 NOTE — TELEPHONE ENCOUNTER
Last Prolia injection 11/01/21. Patient will be due for next injection on or after 05/01/22. Patient has [employer]. Printed out [employer] form for prolia. Completed and attached supporting documents. Faxed.

## 2022-04-14 ENCOUNTER — OFFICE VISIT (OUTPATIENT)
Dept: INTERNAL MEDICINE CLINIC | Facility: CLINIC | Age: 57
End: 2022-04-14
Payer: COMMERCIAL

## 2022-04-14 ENCOUNTER — PATIENT MESSAGE (OUTPATIENT)
Dept: ENDOCRINOLOGY CLINIC | Facility: CLINIC | Age: 57
End: 2022-04-14

## 2022-04-14 ENCOUNTER — PATIENT MESSAGE (OUTPATIENT)
Dept: INTERNAL MEDICINE CLINIC | Facility: CLINIC | Age: 57
End: 2022-04-14

## 2022-04-14 VITALS
DIASTOLIC BLOOD PRESSURE: 88 MMHG | TEMPERATURE: 98 F | RESPIRATION RATE: 16 BRPM | WEIGHT: 250 LBS | SYSTOLIC BLOOD PRESSURE: 120 MMHG | HEART RATE: 85 BPM | OXYGEN SATURATION: 99 % | BODY MASS INDEX: 44 KG/M2

## 2022-04-14 DIAGNOSIS — I10 ESSENTIAL HYPERTENSION: Primary | ICD-10-CM

## 2022-04-14 DIAGNOSIS — R00.2 PALPITATIONS: ICD-10-CM

## 2022-04-14 PROCEDURE — 3074F SYST BP LT 130 MM HG: CPT | Performed by: NURSE PRACTITIONER

## 2022-04-14 PROCEDURE — 3079F DIAST BP 80-89 MM HG: CPT | Performed by: NURSE PRACTITIONER

## 2022-04-14 PROCEDURE — 99214 OFFICE O/P EST MOD 30 MIN: CPT | Performed by: NURSE PRACTITIONER

## 2022-04-14 PROCEDURE — 93000 ELECTROCARDIOGRAM COMPLETE: CPT | Performed by: NURSE PRACTITIONER

## 2022-04-15 ENCOUNTER — PATIENT MESSAGE (OUTPATIENT)
Dept: INTERNAL MEDICINE CLINIC | Facility: CLINIC | Age: 57
End: 2022-04-15

## 2022-04-15 NOTE — TELEPHONE ENCOUNTER
From: Rosales Yanez  To: GARETT Burton  Sent: 4/14/2022 7:13 PM CDT  Subject: Zio Monitor     Thank you for seeing me today. I had a recommendation for the Zio monitor over a standard Holter Monitor by a friend who is an RN. Do you know if that will be used?

## 2022-04-15 NOTE — TELEPHONE ENCOUNTER
I do not think it is the zio one. I think those are used for a longer duration when needed by cardiology. She can confirm when she gets it placed. Thanks.

## 2022-04-16 LAB
ABSOLUTE BASOPHILS: 32 CELLS/UL (ref 0–200)
ABSOLUTE EOSINOPHILS: 101 CELLS/UL (ref 15–500)
ABSOLUTE LYMPHOCYTES: 1789 CELLS/UL (ref 850–3900)
ABSOLUTE MONOCYTES: 359 CELLS/UL (ref 200–950)
ABSOLUTE NEUTROPHILS: 4019 CELLS/UL (ref 1500–7800)
ALBUMIN/GLOBULIN RATIO: 1.5 (CALC) (ref 1–2.5)
ALBUMIN: 4 G/DL (ref 3.6–5.1)
ALKALINE PHOSPHATASE: 89 U/L (ref 37–153)
ALT: 12 U/L (ref 6–29)
AST: 16 U/L (ref 10–35)
BASOPHILS: 0.5 %
BILIRUBIN, TOTAL: 0.6 MG/DL (ref 0.2–1.2)
BUN: 14 MG/DL (ref 7–25)
CALCIUM: 9.2 MG/DL (ref 8.6–10.4)
CARBON DIOXIDE: 28 MMOL/L (ref 20–32)
CHLORIDE: 103 MMOL/L (ref 98–110)
CHOL/HDLC RATIO: 2.8 (CALC)
CHOLESTEROL, TOTAL: 196 MG/DL
CREATININE: 0.7 MG/DL (ref 0.5–1.05)
EGFR IF AFRICN AM: 113 ML/MIN/1.73M2
EGFR IF NONAFRICN AM: 98 ML/MIN/1.73M2
EOSINOPHILS: 1.6 %
GLOBULIN: 2.7 G/DL (CALC) (ref 1.9–3.7)
GLUCOSE: 90 MG/DL (ref 65–99)
HDL CHOLESTEROL: 69 MG/DL
HEMATOCRIT: 39.4 % (ref 35–45)
HEMOGLOBIN: 13.6 G/DL (ref 11.7–15.5)
LDL-CHOLESTEROL: 103 MG/DL (CALC)
LYMPHOCYTES: 28.4 %
MCH: 30.8 PG (ref 27–33)
MCHC: 34.5 G/DL (ref 32–36)
MCV: 89.1 FL (ref 80–100)
MONOCYTES: 5.7 %
MPV: 10.2 FL (ref 7.5–12.5)
NEUTROPHILS: 63.8 %
NON-HDL CHOLESTEROL: 127 MG/DL (CALC)
PLATELET COUNT: 302 THOUSAND/UL (ref 140–400)
POTASSIUM: 3.7 MMOL/L (ref 3.5–5.3)
PROTEIN, TOTAL: 6.7 G/DL (ref 6.1–8.1)
RDW: 12.2 % (ref 11–15)
RED BLOOD CELL COUNT: 4.42 MILLION/UL (ref 3.8–5.1)
SODIUM: 140 MMOL/L (ref 135–146)
TRIGLYCERIDES: 139 MG/DL
TSH W/REFLEX TO FT4: 4.03 MIU/L
WHITE BLOOD CELL COUNT: 6.3 THOUSAND/UL (ref 3.8–10.8)

## 2022-04-19 ENCOUNTER — HOSPITAL ENCOUNTER (OUTPATIENT)
Dept: CV DIAGNOSTICS | Facility: HOSPITAL | Age: 57
Discharge: HOME | End: 2022-04-19
Attending: NURSE PRACTITIONER
Payer: COMMERCIAL

## 2022-04-19 ENCOUNTER — PATIENT MESSAGE (OUTPATIENT)
Dept: ENDOCRINOLOGY CLINIC | Facility: CLINIC | Age: 57
End: 2022-04-19

## 2022-04-19 DIAGNOSIS — R00.2 PALPITATIONS: ICD-10-CM

## 2022-04-19 PROCEDURE — 93225 XTRNL ECG REC<48 HRS REC: CPT | Performed by: NURSE PRACTITIONER

## 2022-04-21 NOTE — TELEPHONE ENCOUNTER
Received letter from Melanie Gonzalez from 4/13/22 to 4/14/23  Called patient and booked sukhdeep 5/3/22  But said that she was getting some codes from [address] [place] [address] to be used for billing purposes.   rn called [facility] and said those number she received are the debit card numbers to process by billing dept when she gets bill

## 2022-04-22 ENCOUNTER — TELEPHONE (OUTPATIENT)
Dept: INTERNAL MEDICINE CLINIC | Facility: CLINIC | Age: 57
End: 2022-04-22

## 2022-04-22 NOTE — TELEPHONE ENCOUNTER
Kristen Garcia called and stated that Pt would like an order for a Wrist BP monitor fax order [phone].  If any questions please call at [phone]\  Thank you

## 2022-05-03 ENCOUNTER — NURSE ONLY (OUTPATIENT)
Dept: ENDOCRINOLOGY CLINIC | Facility: CLINIC | Age: 57
End: 2022-05-03
Payer: COMMERCIAL

## 2022-05-03 ENCOUNTER — TELEPHONE (OUTPATIENT)
Dept: ENDOCRINOLOGY CLINIC | Facility: CLINIC | Age: 57
End: 2022-05-03

## 2022-05-03 DIAGNOSIS — M81.8 OTHER OSTEOPOROSIS WITHOUT CURRENT PATHOLOGICAL FRACTURE: Primary | ICD-10-CM

## 2022-05-03 PROCEDURE — 96372 THER/PROPH/DIAG INJ SC/IM: CPT | Performed by: INTERNAL MEDICINE

## 2022-05-03 NOTE — TELEPHONE ENCOUNTER
Patient was in clinic today for Prolia injection. While here, scheduled f/u office visit with Dr. Marialuisa Vogel injection for 11/4/22. Patient asked to have any lab orders entered now for prior to that appointment. Dr. Curt Lucero, pended lab orders for Prolia with exception of C-Telopeptide. Can you review/confirm labs? F/u with patient once lab orders entered.

## 2022-05-03 NOTE — PROGRESS NOTES
Patient here for q 6 months Prolia injection. Last injection date: 11/1/21. Administered Prolia (Denosumab) 60mg/1 ml subcutaneously in right upper arm. Patient tolerated well with no complaints. Scheduled next visit on 11/4/22- follow up with Dr. Irving Bush at that time.

## 2022-05-04 NOTE — TELEPHONE ENCOUNTER
[facility] sent advising patient labs ordered to be completed prior to f/u on 11/4/22

## 2022-05-05 ENCOUNTER — PATIENT MESSAGE (OUTPATIENT)
Dept: INTERNAL MEDICINE CLINIC | Facility: CLINIC | Age: 57
End: 2022-05-05

## 2022-05-05 NOTE — TELEPHONE ENCOUNTER
She should follow up to review these with Dr. Bonita Perez and make sure the new cuff is accurate. Keep checking blood pressure once daily in the morning and record. Bring the record and the new cuff to the next appointment so can be checked.

## 2022-05-06 ENCOUNTER — PATIENT MESSAGE (OUTPATIENT)
Dept: INTERNAL MEDICINE CLINIC | Facility: CLINIC | Age: 57
End: 2022-05-06

## 2022-05-07 ENCOUNTER — TELEPHONE (OUTPATIENT)
Dept: INTERNAL MEDICINE CLINIC | Facility: CLINIC | Age: 57
End: 2022-05-07

## 2022-05-07 NOTE — TELEPHONE ENCOUNTER
Incoming (mail or fax):  fax  Received from:  [address]  Documentation given to:  Triage incoming bin    Detailed written order for automatic blood pressure monitor

## 2022-05-09 NOTE — TELEPHONE ENCOUNTER
Received written order for automatic bp monitor from [employer]  Spoke with pt, pt says she already has the bp monitor at home but needs a signed order proving medical necessity for one so insurance will cover it   Requires md signature

## 2022-05-13 ENCOUNTER — OFFICE VISIT (OUTPATIENT)
Dept: INTERNAL MEDICINE CLINIC | Facility: CLINIC | Age: 57
End: 2022-05-13
Payer: COMMERCIAL

## 2022-05-13 VITALS
TEMPERATURE: 98 F | SYSTOLIC BLOOD PRESSURE: 126 MMHG | HEIGHT: 63 IN | RESPIRATION RATE: 14 BRPM | HEART RATE: 89 BPM | OXYGEN SATURATION: 99 % | WEIGHT: 250.69 LBS | BODY MASS INDEX: 44.42 KG/M2 | DIASTOLIC BLOOD PRESSURE: 76 MMHG

## 2022-05-13 DIAGNOSIS — E78.5 DYSLIPIDEMIA, GOAL LDL BELOW 100: ICD-10-CM

## 2022-05-13 DIAGNOSIS — I10 ESSENTIAL HYPERTENSION: Primary | ICD-10-CM

## 2022-05-13 PROCEDURE — 3074F SYST BP LT 130 MM HG: CPT | Performed by: INTERNAL MEDICINE

## 2022-05-13 PROCEDURE — 3008F BODY MASS INDEX DOCD: CPT | Performed by: INTERNAL MEDICINE

## 2022-05-13 PROCEDURE — 99214 OFFICE O/P EST MOD 30 MIN: CPT | Performed by: INTERNAL MEDICINE

## 2022-05-13 PROCEDURE — 3078F DIAST BP <80 MM HG: CPT | Performed by: INTERNAL MEDICINE

## 2022-05-13 RX ORDER — METOPROLOL SUCCINATE 25 MG/1
25 TABLET, EXTENDED RELEASE ORAL DAILY
Qty: 90 TABLET | Refills: 1 | Status: SHIPPED | OUTPATIENT
Start: 2022-05-13

## 2022-05-13 RX ORDER — PANTOPRAZOLE SODIUM 40 MG/1
TABLET, DELAYED RELEASE ORAL
Qty: 90 TABLET | Refills: 1 | Status: SHIPPED | OUTPATIENT
Start: 2022-05-13

## 2022-05-13 RX ORDER — OLMESARTAN MEDOXOMIL AND HYDROCHLOROTHIAZIDE 40/25 40; 25 MG/1; MG/1
1 TABLET ORAL DAILY
Qty: 90 TABLET | Refills: 1 | Status: SHIPPED | OUTPATIENT
Start: 2022-05-13

## 2022-05-13 RX ORDER — ROSUVASTATIN CALCIUM 10 MG/1
10 TABLET, COATED ORAL NIGHTLY
Qty: 90 TABLET | Refills: 1 | Status: SHIPPED | OUTPATIENT
Start: 2022-05-13

## 2022-05-15 ENCOUNTER — IMMUNIZATION (OUTPATIENT)
Dept: LAB | Age: 57
End: 2022-05-15
Attending: EMERGENCY MEDICINE
Payer: COMMERCIAL

## 2022-05-15 DIAGNOSIS — Z23 NEED FOR VACCINATION: Primary | ICD-10-CM

## 2022-05-15 PROCEDURE — 0064A SARSCOV2 VAC 50MCG/0.25ML IM: CPT

## 2022-05-19 DIAGNOSIS — I10 ESSENTIAL HYPERTENSION: Primary | ICD-10-CM

## 2022-05-19 RX ORDER — HYDROCHLOROTHIAZIDE 25 MG/1
25 TABLET ORAL DAILY
Qty: 90 TABLET | Refills: 1 | Status: SHIPPED | OUTPATIENT
Start: 2022-05-19

## 2022-05-19 RX ORDER — OLMESARTAN MEDOXOMIL AND HYDROCHLOROTHIAZIDE 40/25 40; 25 MG/1; MG/1
1 TABLET ORAL DAILY
Qty: 90 TABLET | Refills: 1 | Status: CANCELLED | OUTPATIENT
Start: 2022-05-19

## 2022-05-19 RX ORDER — OLMESARTAN MEDOXOMIL 40 MG/1
20 TABLET ORAL DAILY
Qty: 45 TABLET | Refills: 1 | Status: SHIPPED | OUTPATIENT
Start: 2022-05-19

## 2022-05-19 NOTE — TELEPHONE ENCOUNTER
Dr Nany Alford prescribed combo med for these prescriptions 5/13/22  Med is too expensive pt is requesting both meds be sent separately and to a new pharmacy   meds pass protocol but Carmen Marino MD [facility] internal med has prescribed these meds in the past   Is it ok to send 90ds and 1 refill to new pharmacy as to separate prescriptions?  rx's pended 90ds and 1 refill since pt is due for med check and PE in six months

## 2022-05-19 NOTE — TELEPHONE ENCOUNTER
Did the patient contact the pharmacy directly?:  No  New pharm    Is patient out of meds or supply very low?:  3 weeks    Medication Requested:  Olmesartan Medoxomil 40 MG Oral Tab     Dose:  1/2 tablet     Is patient requesting a 30 or 90 day supply?: 90    Pharmacy name and phone # or location:  [address] [place], [place] [address] [phone], [phone]    Is the patient due for an appointment?: no   (if so, please schedule appt)    Additional Notes:  1 of 2        Did the patient contact the pharmacy directly?:  No New Pharm    Is patient out of meds or supply very low?: 1 month worth    Medication Requested:  hydroCHLOROthiazide 25 MG Oral Tab     Dose:      Is patient requesting a 30 or 90 day supply?:  90    Pharmacy name and phone # or location:  [address] [place], [place] [address] [phone], [phone]    Is the patient due for an appointment?:   (if so, please schedule appt)    Additional Notes:  2 of 2      Pt called and stated that she wants to have these 2 medications sent separately. It is going to cost too much to have them in combined form. Pt is also wanting them to be sent to a new Pharm. Pt not sure how long it will take to get meds with new pharm so Pt wants to get them sent earlier than normal. Pt did cancel Rx for combined Rx of Olmesartan Medoxomil-HCTZ 40-25 MG Oral Tab [facility], it was going to cost $150.    Please send  Rx to new Pharm

## 2022-06-02 RX ORDER — ERGOCALCIFEROL 1.25 MG/1
CAPSULE ORAL
Qty: 24 CAPSULE | Refills: 0 | Status: SHIPPED | OUTPATIENT
Start: 2022-06-02

## 2022-07-06 ENCOUNTER — OFFICE VISIT (OUTPATIENT)
Dept: GASTROENTEROLOGY | Facility: CLINIC | Age: 57
End: 2022-07-06
Payer: COMMERCIAL

## 2022-07-06 VITALS
HEIGHT: 63 IN | BODY MASS INDEX: 44 KG/M2 | HEART RATE: 81 BPM | DIASTOLIC BLOOD PRESSURE: 89 MMHG | SYSTOLIC BLOOD PRESSURE: 123 MMHG

## 2022-07-06 DIAGNOSIS — K44.9 HIATAL HERNIA WITH GERD: ICD-10-CM

## 2022-07-06 DIAGNOSIS — D50.9 IRON DEFICIENCY ANEMIA, UNSPECIFIED IRON DEFICIENCY ANEMIA TYPE: ICD-10-CM

## 2022-07-06 DIAGNOSIS — R10.13 EPIGASTRIC ABDOMINAL PAIN: ICD-10-CM

## 2022-07-06 DIAGNOSIS — K21.9 HIATAL HERNIA WITH GERD: ICD-10-CM

## 2022-07-06 DIAGNOSIS — R10.11 RUQ PAIN: ICD-10-CM

## 2022-07-06 DIAGNOSIS — K76.0 FATTY LIVER DISEASE, NONALCOHOLIC: Primary | ICD-10-CM

## 2022-07-06 DIAGNOSIS — R10.13 EPIGASTRIC PAIN: ICD-10-CM

## 2022-07-06 PROCEDURE — 3079F DIAST BP 80-89 MM HG: CPT | Performed by: INTERNAL MEDICINE

## 2022-07-06 PROCEDURE — 99215 OFFICE O/P EST HI 40 MIN: CPT | Performed by: INTERNAL MEDICINE

## 2022-07-06 PROCEDURE — 3074F SYST BP LT 130 MM HG: CPT | Performed by: INTERNAL MEDICINE

## 2022-07-06 PROCEDURE — 3008F BODY MASS INDEX DOCD: CPT | Performed by: INTERNAL MEDICINE

## 2022-07-07 PROBLEM — K44.9 HIATAL HERNIA WITH GERD: Status: ACTIVE | Noted: 2017-09-01

## 2022-07-07 PROBLEM — K21.9 HIATAL HERNIA WITH GERD: Status: ACTIVE | Noted: 2017-09-01

## 2022-08-09 ENCOUNTER — TELEPHONE (OUTPATIENT)
Dept: GASTROENTEROLOGY | Facility: CLINIC | Age: 57
End: 2022-08-09

## 2022-08-09 NOTE — TELEPHONE ENCOUNTER
1st reminder letter mailed and sent out to patient regards to her overdue labs order:     CBC, PLATELET; NO DIFFERENTIAL   COMP METABOLIC PANEL (14)   LIPASE

## 2022-09-16 ENCOUNTER — TELEPHONE (OUTPATIENT)
Dept: ENDOCRINOLOGY CLINIC | Facility: CLINIC | Age: 57
End: 2022-09-16

## 2022-09-16 DIAGNOSIS — M81.8 OTHER OSTEOPOROSIS WITHOUT CURRENT PATHOLOGICAL FRACTURE: Primary | ICD-10-CM

## 2022-09-16 NOTE — TELEPHONE ENCOUNTER
Patient requesting prior auth for Prolia. Patient states insurance prefers that rx is obtained from specialty pharmacy prior to injection. Please call to discuss. Thank you.

## 2022-09-18 LAB
ALBUMIN/GLOBULIN RATIO: 1.9 (CALC) (ref 1–2.5)
ALBUMIN: 4.4 G/DL (ref 3.6–5.1)
ALKALINE PHOSPHATASE: 93 U/L (ref 37–153)
ALT: 12 U/L (ref 6–29)
AST: 14 U/L (ref 10–35)
BILIRUBIN, TOTAL: 0.7 MG/DL (ref 0.2–1.2)
BUN: 14 MG/DL (ref 7–25)
CALCIUM: 9.2 MG/DL (ref 8.6–10.4)
CARBON DIOXIDE: 28 MMOL/L (ref 20–32)
CHLORIDE: 102 MMOL/L (ref 98–110)
CHOL/HDLC RATIO: 2.7 (CALC)
CHOLESTEROL, TOTAL: 184 MG/DL
CREATININE: 0.66 MG/DL (ref 0.5–1.03)
EGFR: 104 ML/MIN/1.73M2
GLOBULIN: 2.3 G/DL (CALC) (ref 1.9–3.7)
GLUCOSE: 87 MG/DL (ref 65–99)
HDL CHOLESTEROL: 67 MG/DL
LDL-CHOLESTEROL: 91 MG/DL (CALC)
NON-HDL CHOLESTEROL: 117 MG/DL (CALC)
POTASSIUM: 3.7 MMOL/L (ref 3.5–5.3)
PROTEIN, TOTAL: 6.7 G/DL (ref 6.1–8.1)
SODIUM: 139 MMOL/L (ref 135–146)
TRIGLYCERIDES: 163 MG/DL

## 2022-09-21 NOTE — TELEPHONE ENCOUNTER
Medication  CGM  pump supply Requested: (Denosumab)  Prolia                                                             CoverMyMeds Used: No    Key: N/a    Sig: Inject 60mg/ mL into the skin every 6 months.      DX Code: M81.0                                       Case Number/Pending Ref#: N/a      Routing over to PA Dept for assistance, thanks!  ----------------------------------------------------------------    - Not a [address] [place] [address] - Prescription will be sent to [facility]  determination has to be done under pharmacy benefits

## 2022-09-21 NOTE — TELEPHONE ENCOUNTER
Dr. Werner Loaiza - Due to cost of [facility] for Prolia, Pt would like to obtain from Specialty Pharmacy.  PA in process, routing as an fyi

## 2022-09-23 ENCOUNTER — OFFICE VISIT (OUTPATIENT)
Dept: INTERNAL MEDICINE CLINIC | Facility: CLINIC | Age: 57
End: 2022-09-23

## 2022-09-23 VITALS
DIASTOLIC BLOOD PRESSURE: 74 MMHG | HEIGHT: 63 IN | SYSTOLIC BLOOD PRESSURE: 120 MMHG | WEIGHT: 263.19 LBS | TEMPERATURE: 98 F | RESPIRATION RATE: 12 BRPM | HEART RATE: 84 BPM | BODY MASS INDEX: 46.63 KG/M2

## 2022-09-23 DIAGNOSIS — Z23 NEED FOR VACCINATION: ICD-10-CM

## 2022-09-23 DIAGNOSIS — Z12.31 ENCOUNTER FOR SCREENING MAMMOGRAM FOR MALIGNANT NEOPLASM OF BREAST: ICD-10-CM

## 2022-09-23 DIAGNOSIS — K21.9 GASTROESOPHAGEAL REFLUX DISEASE WITHOUT ESOPHAGITIS: ICD-10-CM

## 2022-09-23 DIAGNOSIS — I10 ESSENTIAL HYPERTENSION: ICD-10-CM

## 2022-09-23 DIAGNOSIS — Z00.00 PHYSICAL EXAM, ANNUAL: ICD-10-CM

## 2022-09-23 DIAGNOSIS — E78.5 DYSLIPIDEMIA, GOAL LDL BELOW 100: ICD-10-CM

## 2022-09-23 PROCEDURE — 90471 IMMUNIZATION ADMIN: CPT | Performed by: INTERNAL MEDICINE

## 2022-09-23 PROCEDURE — 3074F SYST BP LT 130 MM HG: CPT | Performed by: INTERNAL MEDICINE

## 2022-09-23 PROCEDURE — 99396 PREV VISIT EST AGE 40-64: CPT | Performed by: INTERNAL MEDICINE

## 2022-09-23 PROCEDURE — 99214 OFFICE O/P EST MOD 30 MIN: CPT | Performed by: INTERNAL MEDICINE

## 2022-09-23 PROCEDURE — 3008F BODY MASS INDEX DOCD: CPT | Performed by: INTERNAL MEDICINE

## 2022-09-23 PROCEDURE — 3078F DIAST BP <80 MM HG: CPT | Performed by: INTERNAL MEDICINE

## 2022-09-23 PROCEDURE — 90686 IIV4 VACC NO PRSV 0.5 ML IM: CPT | Performed by: INTERNAL MEDICINE

## 2022-09-23 RX ORDER — HYDROCHLOROTHIAZIDE 25 MG/1
25 TABLET ORAL DAILY
Qty: 90 TABLET | Refills: 1 | Status: SHIPPED | OUTPATIENT
Start: 2022-09-23

## 2022-09-23 RX ORDER — PANTOPRAZOLE SODIUM 40 MG/1
TABLET, DELAYED RELEASE ORAL
Qty: 90 TABLET | Refills: 1 | Status: SHIPPED | OUTPATIENT
Start: 2022-09-23

## 2022-09-23 RX ORDER — METOPROLOL SUCCINATE 25 MG/1
25 TABLET, EXTENDED RELEASE ORAL DAILY
Qty: 90 TABLET | Refills: 1 | Status: SHIPPED | OUTPATIENT
Start: 2022-09-23

## 2022-09-23 RX ORDER — OLMESARTAN MEDOXOMIL 40 MG/1
20 TABLET ORAL DAILY
Qty: 45 TABLET | Refills: 1 | Status: SHIPPED | OUTPATIENT
Start: 2022-09-23

## 2022-09-23 RX ORDER — ROSUVASTATIN CALCIUM 10 MG/1
10 TABLET, COATED ORAL NIGHTLY
Qty: 90 TABLET | Refills: 1 | Status: SHIPPED | OUTPATIENT
Start: 2022-09-23

## 2022-10-06 NOTE — TELEPHONE ENCOUNTER
Called [facility] [phone], on hold >20min, spoke with Mary. Inquired determination of Prolia under pharmacy benefit. She does not see the PA  Faxed 10/3/2022 . She states patient already has approval until 4/14/2023 under the pharmacy benefit. Began 4/14/2022. Auth # [id number]. She can [facility] or [facility]. She will fax previous approval letter to [phone]. Call ref # Analy Jones 10/06/2022    Renny Mera and spoke with [facility] in medical review dept. She confirmed that PA already approved from 4/14/2022-4/14/2023  is under the Pharmacy benefit.    Call ref # [facility] 10/6/2022

## 2022-10-07 ENCOUNTER — OFFICE VISIT (OUTPATIENT)
Dept: OBGYN CLINIC | Facility: CLINIC | Age: 57
End: 2022-10-07
Payer: COMMERCIAL

## 2022-10-07 ENCOUNTER — PATIENT MESSAGE (OUTPATIENT)
Dept: ENDOCRINOLOGY CLINIC | Facility: CLINIC | Age: 57
End: 2022-10-07

## 2022-10-07 VITALS
DIASTOLIC BLOOD PRESSURE: 88 MMHG | HEART RATE: 88 BPM | HEIGHT: 63 IN | BODY MASS INDEX: 47 KG/M2 | SYSTOLIC BLOOD PRESSURE: 131 MMHG

## 2022-10-07 DIAGNOSIS — Z01.419 ENCOUNTER FOR GYNECOLOGICAL EXAMINATION: Primary | ICD-10-CM

## 2022-10-07 PROCEDURE — 3008F BODY MASS INDEX DOCD: CPT | Performed by: OBSTETRICS & GYNECOLOGY

## 2022-10-07 PROCEDURE — 99396 PREV VISIT EST AGE 40-64: CPT | Performed by: OBSTETRICS & GYNECOLOGY

## 2022-10-07 PROCEDURE — 3075F SYST BP GE 130 - 139MM HG: CPT | Performed by: OBSTETRICS & GYNECOLOGY

## 2022-10-07 PROCEDURE — 3079F DIAST BP 80-89 MM HG: CPT | Performed by: OBSTETRICS & GYNECOLOGY

## 2022-10-07 RX ORDER — DENOSUMAB 60 MG/ML
60 INJECTION SUBCUTANEOUS ONCE
Qty: 1 ML | Refills: 0 | Status: SHIPPED | OUTPATIENT
Start: 2022-10-07 | End: 2022-10-07

## 2022-10-07 NOTE — TELEPHONE ENCOUNTER
Patient states rx Prolia can be submitted to [facility]. For additional questions please call. Thank you.

## 2022-10-07 NOTE — TELEPHONE ENCOUNTER
Notified patient of approval. Patient is going to contact [facility] regarding Prolia co-pay card to see if it can be used. She will contact office with an update.

## 2022-10-07 NOTE — TELEPHONE ENCOUNTER
From: John Carrington  To: Ravi Sousa MD  Sent: 10/7/2022 12:54 PM CDT  Subject: Prolia    I called and left a message for Milady Dailey. Please send prescription to [facility]. Thank you!   Frank Cabrera

## 2022-10-08 ENCOUNTER — IMMUNIZATION (OUTPATIENT)
Dept: LAB | Age: 57
End: 2022-10-08
Attending: EMERGENCY MEDICINE
Payer: COMMERCIAL

## 2022-10-08 DIAGNOSIS — Z23 NEED FOR VACCINATION: Primary | ICD-10-CM

## 2022-10-08 PROCEDURE — 0134A SARSCOV2 VAC BVL 50MCG/0.5ML: CPT

## 2022-10-11 ENCOUNTER — TELEPHONE (OUTPATIENT)
Dept: ENDOCRINOLOGY CLINIC | Facility: CLINIC | Age: 57
End: 2022-10-11

## 2022-10-11 NOTE — TELEPHONE ENCOUNTER
Last Prolia shot 05/03/22. Next shot due on or after 11/04/22. Per TE 04/19  \"Received letter from [place] approving Prolia from 4/13/22 to 4/14/23\". Patient has scheduled RN visit 11/04/22.

## 2022-10-12 LAB — HPV MRNA E6/E7: NOT DETECTED

## 2022-10-28 ENCOUNTER — HOSPITAL ENCOUNTER (OUTPATIENT)
Dept: MAMMOGRAPHY | Age: 57
Discharge: HOME | End: 2022-10-28
Attending: INTERNAL MEDICINE
Payer: COMMERCIAL

## 2022-10-28 DIAGNOSIS — Z12.31 ENCOUNTER FOR SCREENING MAMMOGRAM FOR MALIGNANT NEOPLASM OF BREAST: ICD-10-CM

## 2022-10-28 PROCEDURE — 77067 SCR MAMMO BI INCL CAD: CPT | Performed by: INTERNAL MEDICINE

## 2022-10-28 PROCEDURE — 77063 BREAST TOMOSYNTHESIS BI: CPT | Performed by: INTERNAL MEDICINE

## 2022-11-04 ENCOUNTER — NURSE ONLY (OUTPATIENT)
Dept: ENDOCRINOLOGY CLINIC | Facility: CLINIC | Age: 57
End: 2022-11-04
Payer: COMMERCIAL

## 2022-11-04 DIAGNOSIS — M81.0 AGE-RELATED OSTEOPOROSIS WITHOUT CURRENT PATHOLOGICAL FRACTURE: ICD-10-CM

## 2022-11-04 DIAGNOSIS — M81.8 OTHER OSTEOPOROSIS WITHOUT CURRENT PATHOLOGICAL FRACTURE: Primary | ICD-10-CM

## 2022-11-04 PROCEDURE — 96372 THER/PROPH/DIAG INJ SC/IM: CPT | Performed by: INTERNAL MEDICINE

## 2022-11-04 NOTE — PROGRESS NOTES
Patient arrived at clinic for prolia injection - patient supplied sealed 60mg Prolia syringe; patient given and tolerated 60mg prolia injection to left upper arm.   Patient signed paperwork (sent for scanning)  Patient stated understanding to return for next prolia injection in 6 months with repeat labs  Patient left clinic in stable condition

## 2022-11-19 ENCOUNTER — OFFICE VISIT (OUTPATIENT)
Dept: ENDOCRINOLOGY CLINIC | Facility: CLINIC | Age: 57
End: 2022-11-19
Payer: COMMERCIAL

## 2022-11-19 VITALS — DIASTOLIC BLOOD PRESSURE: 91 MMHG | SYSTOLIC BLOOD PRESSURE: 133 MMHG | HEART RATE: 98 BPM

## 2022-11-19 DIAGNOSIS — M81.0 AGE-RELATED OSTEOPOROSIS WITHOUT CURRENT PATHOLOGICAL FRACTURE: Primary | ICD-10-CM

## 2022-11-19 PROCEDURE — 3075F SYST BP GE 130 - 139MM HG: CPT | Performed by: INTERNAL MEDICINE

## 2022-11-19 PROCEDURE — 3080F DIAST BP >= 90 MM HG: CPT | Performed by: INTERNAL MEDICINE

## 2022-11-19 PROCEDURE — 99213 OFFICE O/P EST LOW 20 MIN: CPT | Performed by: INTERNAL MEDICINE

## 2022-11-25 ENCOUNTER — HOSPITAL ENCOUNTER (OUTPATIENT)
Dept: BONE DENSITY | Age: 57
Discharge: HOME | End: 2022-11-25
Attending: INTERNAL MEDICINE
Payer: COMMERCIAL

## 2022-11-25 DIAGNOSIS — M81.0 AGE-RELATED OSTEOPOROSIS WITHOUT CURRENT PATHOLOGICAL FRACTURE: ICD-10-CM

## 2022-11-25 PROCEDURE — 77080 DXA BONE DENSITY AXIAL: CPT | Performed by: INTERNAL MEDICINE

## 2022-12-08 ENCOUNTER — HOSPITAL ENCOUNTER (OUTPATIENT)
Age: 57
Discharge: HOME | End: 2022-12-08
Payer: COMMERCIAL

## 2022-12-08 ENCOUNTER — E-VISIT (OUTPATIENT)
Dept: TELEHEALTH | Age: 57
End: 2022-12-08

## 2022-12-08 VITALS
OXYGEN SATURATION: 99 % | HEART RATE: 102 BPM | DIASTOLIC BLOOD PRESSURE: 87 MMHG | RESPIRATION RATE: 18 BRPM | SYSTOLIC BLOOD PRESSURE: 147 MMHG | TEMPERATURE: 100 F

## 2022-12-08 DIAGNOSIS — Z20.822 LAB TEST NEGATIVE FOR COVID-19 VIRUS: ICD-10-CM

## 2022-12-08 DIAGNOSIS — B34.9 VIRAL ILLNESS: Primary | ICD-10-CM

## 2022-12-08 DIAGNOSIS — Z02.9 ADMINISTRATIVE ENCOUNTER: Primary | ICD-10-CM

## 2022-12-08 LAB
ALKALINE PHOSPHATASE, BONE SPECIFIC: 10.4 MCG/L (ref 5.6–29)
BUN: 14 MG/DL (ref 7–25)
CALCIUM: 9.3 MG/DL (ref 8.6–10.4)
CARBON DIOXIDE: 25 MMOL/L (ref 20–32)
CHLORIDE: 102 MMOL/L (ref 98–110)
CREATININE: 0.79 MG/DL (ref 0.5–1.03)
EGFR: 88 ML/MIN/1.73M2
GLUCOSE: 95 MG/DL (ref 65–99)
PARATHYROID HORMONE,$INTACT: 85 PG/ML (ref 16–77)
POCT INFLUENZA A: NEGATIVE
POCT INFLUENZA B: NEGATIVE
POTASSIUM: 3.8 MMOL/L (ref 3.5–5.3)
S PYO AG THROAT QL: NEGATIVE
SARS-COV-2 RNA RESP QL NAA+PROBE: NOT DETECTED
SODIUM: 139 MMOL/L (ref 135–146)
VITAMIN D, 25-OH, TOTAL: 63 NG/ML (ref 30–100)

## 2022-12-08 PROCEDURE — 99213 OFFICE O/P EST LOW 20 MIN: CPT | Performed by: NURSE PRACTITIONER

## 2022-12-08 PROCEDURE — 87502 INFLUENZA DNA AMP PROBE: CPT | Performed by: NURSE PRACTITIONER

## 2022-12-08 PROCEDURE — 87880 STREP A ASSAY W/OPTIC: CPT | Performed by: NURSE PRACTITIONER

## 2022-12-08 PROCEDURE — U0002 COVID-19 LAB TEST NON-CDC: HCPCS | Performed by: NURSE PRACTITIONER

## 2022-12-08 RX ORDER — CODEINE PHOSPHATE AND GUAIFENESIN 10; 100 MG/5ML; MG/5ML
5 SOLUTION ORAL EVERY 6 HOURS PRN
Qty: 118 ML | Refills: 0 | Status: SHIPPED | OUTPATIENT
Start: 2022-12-08 | End: 2022-12-08

## 2022-12-08 RX ORDER — BENZONATATE 100 MG/1
100 CAPSULE ORAL 3 TIMES DAILY PRN
Qty: 20 CAPSULE | Refills: 0 | Status: SHIPPED | OUTPATIENT
Start: 2022-12-08

## 2022-12-08 NOTE — DISCHARGE INSTRUCTIONS
Continue over-the-counter Nasacort or Flonase nasal spray and a 24-hour allergy pill such as Allegra Claritin or Zyrtec to help with congestion and postnasal drip. Take the cough medication as prescribed this medication may cause drowsiness no driving or operating machinery while taking this medication. Drink plenty of fluids warm tea with honey eat as tolerated follow-up with your primary care provider if symptoms persist or worsen.

## 2022-12-08 NOTE — ED INITIAL ASSESSMENT (HPI)
Pt came in due to cough, congestion, fever, and sore throat for the past 4 days. Pt has easy non labored respirations.

## 2022-12-08 NOTE — PROGRESS NOTES
Patient was directed to be seen at one of our [facility] or WICs for evaluation in person and viral testing. Patient agrees that she will seek further evaluation. She has not decided on the site yet. See [employer]. E-visit cancelled with no charge.

## 2023-01-21 ENCOUNTER — PATIENT MESSAGE (OUTPATIENT)
Dept: ENDOCRINOLOGY CLINIC | Facility: CLINIC | Age: 58
End: 2023-01-21

## 2023-01-24 RX ORDER — ERGOCALCIFEROL 1.25 MG/1
CAPSULE ORAL
Qty: 24 CAPSULE | Refills: 0 | Status: SHIPPED | OUTPATIENT
Start: 2023-01-24

## 2023-01-24 NOTE — TELEPHONE ENCOUNTER
From: Keira Espinosa  To: Addy Ac MD  Sent: 1/21/2023 3:25 PM CST  Subject: New pharmacy for refill    I need my vitamin D prescription sent to [facility]. Would you please send it there? 90 day supply at a time. Thank you!

## 2023-03-22 NOTE — TELEPHONE ENCOUNTER
MESSAGE TO [employer].
PT IS HAVING A REALLY HARD TIME TRYING TO GET THE PATHOLOGIST'S BILL PAID. THEY ARE BILLING HER MONTHLY FOR 7 MONTHS. PT WAS TOLD BY THE INSURANCE THEY WILL NEED A COPY OF THE PROCEDURE BECAUSE THE PATH OFFICE IS NOT PROVIDING THE APPROPRIATE INFO.   PT T
PT REQUESTING A COPY OF THE PROCEDURE THAT WAS DONE 10/14/16 / THE PROCEDURE WAS A COLPO / PT STATE SHE WILL NEED THAT INFO MAILED TO THE INSURANCE COMPANY / [id number] [facility] / [place] [employer]
SPOKE WITH [employer] FINANCE COUNSELOR, AND SHE WOULD LIKE THE ACCOUNT NUMBER FOR THIS BILL AND THE PHONE NUMBER SHE HAS BEEN CALLING. LEFT MESSAGE ASKING PT TO CALL BACK WITH THIS INFORMATION.
The pt is returning a nurse's call. The pt states that her account number is [id number], and she has been in contact with someone at [phone]. The pt also states that it is for date of service 10/14/2016. Please advise.
Never

## 2023-04-04 RX ORDER — DENOSUMAB 60 MG/ML
INJECTION SUBCUTANEOUS
Qty: 1 ML | Refills: 0 | Status: SHIPPED | OUTPATIENT
Start: 2023-04-04

## 2023-04-04 NOTE — TELEPHONE ENCOUNTER
LOV: 11/19/2022  RTC: 1 year, no upcoming  Last refill: 11/04/2022    Dr. Filipe Alexis review and sign pended prescription if agreeable.

## 2023-04-07 NOTE — TELEPHONE ENCOUNTER
[facility] is request a prior authorization is needed for medication.  Please submit

## 2023-04-23 LAB
ABSOLUTE BASOPHILS: 23 CELLS/UL (ref 0–200)
ABSOLUTE EOSINOPHILS: 109 CELLS/UL (ref 15–500)
ABSOLUTE LYMPHOCYTES: 1942 CELLS/UL (ref 850–3900)
ABSOLUTE MONOCYTES: 476 CELLS/UL (ref 200–950)
ABSOLUTE NEUTROPHILS: 5249 CELLS/UL (ref 1500–7800)
ALBUMIN/GLOBULIN RATIO: 1.9 (CALC) (ref 1–2.5)
ALBUMIN: 4.5 G/DL (ref 3.6–5.1)
ALKALINE PHOSPHATASE: 98 U/L (ref 37–153)
ALT: 16 U/L (ref 6–29)
AST: 15 U/L (ref 10–35)
BASOPHILS: 0.3 %
BILIRUBIN, TOTAL: 0.6 MG/DL (ref 0.2–1.2)
BUN: 13 MG/DL (ref 7–25)
CALCIUM: 9.5 MG/DL (ref 8.6–10.4)
CARBON DIOXIDE: 28 MMOL/L (ref 20–32)
CHLORIDE: 101 MMOL/L (ref 98–110)
CHOL/HDLC RATIO: 2.5 (CALC)
CHOLESTEROL, TOTAL: 174 MG/DL
CREATININE: 0.79 MG/DL (ref 0.5–1.03)
EGFR: 88 ML/MIN/1.73M2
EOSINOPHILS: 1.4 %
GLOBULIN: 2.4 G/DL (CALC) (ref 1.9–3.7)
GLUCOSE: 82 MG/DL (ref 65–99)
HDL CHOLESTEROL: 71 MG/DL
HEMATOCRIT: 40.7 % (ref 35–45)
HEMOGLOBIN: 13.3 G/DL (ref 11.7–15.5)
LDL-CHOLESTEROL: 79 MG/DL (CALC)
LYMPHOCYTES: 24.9 %
MCH: 29.2 PG (ref 27–33)
MCHC: 32.7 G/DL (ref 32–36)
MCV: 89.3 FL (ref 80–100)
MONOCYTES: 6.1 %
MPV: 10.2 FL (ref 7.5–12.5)
NEUTROPHILS: 67.3 %
NON-HDL CHOLESTEROL: 103 MG/DL (CALC)
PLATELET COUNT: 297 THOUSAND/UL (ref 140–400)
POTASSIUM: 3.8 MMOL/L (ref 3.5–5.3)
PROTEIN, TOTAL: 6.9 G/DL (ref 6.1–8.1)
RDW: 12.4 % (ref 11–15)
RED BLOOD CELL COUNT: 4.56 MILLION/UL (ref 3.8–5.1)
SODIUM: 139 MMOL/L (ref 135–146)
TRIGLYCERIDES: 140 MG/DL
TSH W/REFLEX TO FT4: 3.07 MIU/L (ref 0.4–4.5)
WHITE BLOOD CELL COUNT: 7.8 THOUSAND/UL (ref 3.8–10.8)

## 2023-04-28 ENCOUNTER — OFFICE VISIT (OUTPATIENT)
Dept: INTERNAL MEDICINE CLINIC | Facility: CLINIC | Age: 58
End: 2023-04-28
Payer: COMMERCIAL

## 2023-04-28 VITALS
BODY MASS INDEX: 46.42 KG/M2 | DIASTOLIC BLOOD PRESSURE: 86 MMHG | HEIGHT: 63 IN | SYSTOLIC BLOOD PRESSURE: 128 MMHG | OXYGEN SATURATION: 99 % | HEART RATE: 80 BPM | WEIGHT: 262 LBS | TEMPERATURE: 99 F | RESPIRATION RATE: 20 BRPM

## 2023-04-28 DIAGNOSIS — E78.5 DYSLIPIDEMIA, GOAL LDL BELOW 100: ICD-10-CM

## 2023-04-28 DIAGNOSIS — Z00.00 PHYSICAL EXAM, ANNUAL: ICD-10-CM

## 2023-04-28 DIAGNOSIS — M81.0 AGE-RELATED OSTEOPOROSIS WITHOUT CURRENT PATHOLOGICAL FRACTURE: ICD-10-CM

## 2023-04-28 DIAGNOSIS — M25.561 CHRONIC PAIN OF RIGHT KNEE: ICD-10-CM

## 2023-04-28 DIAGNOSIS — I34.0 MILD MITRAL REGURGITATION: ICD-10-CM

## 2023-04-28 DIAGNOSIS — K21.9 GASTROESOPHAGEAL REFLUX DISEASE WITHOUT ESOPHAGITIS: ICD-10-CM

## 2023-04-28 DIAGNOSIS — G43.009 MIGRAINE WITHOUT AURA AND WITHOUT STATUS MIGRAINOSUS, NOT INTRACTABLE: ICD-10-CM

## 2023-04-28 DIAGNOSIS — I10 ESSENTIAL HYPERTENSION: ICD-10-CM

## 2023-04-28 DIAGNOSIS — G89.29 CHRONIC PAIN OF RIGHT KNEE: ICD-10-CM

## 2023-04-28 PROCEDURE — 3074F SYST BP LT 130 MM HG: CPT | Performed by: INTERNAL MEDICINE

## 2023-04-28 PROCEDURE — 99214 OFFICE O/P EST MOD 30 MIN: CPT | Performed by: INTERNAL MEDICINE

## 2023-04-28 PROCEDURE — 3079F DIAST BP 80-89 MM HG: CPT | Performed by: INTERNAL MEDICINE

## 2023-04-28 PROCEDURE — 3008F BODY MASS INDEX DOCD: CPT | Performed by: INTERNAL MEDICINE

## 2023-04-28 RX ORDER — HYDROCHLOROTHIAZIDE 25 MG/1
25 TABLET ORAL DAILY
Qty: 90 TABLET | Refills: 1 | Status: SHIPPED | OUTPATIENT
Start: 2023-04-28

## 2023-04-28 RX ORDER — ROSUVASTATIN CALCIUM 10 MG/1
10 TABLET, COATED ORAL NIGHTLY
Qty: 90 TABLET | Refills: 1 | Status: SHIPPED | OUTPATIENT
Start: 2023-04-28

## 2023-04-28 RX ORDER — PANTOPRAZOLE SODIUM 40 MG/1
TABLET, DELAYED RELEASE ORAL
Qty: 90 TABLET | Refills: 1 | Status: SHIPPED | OUTPATIENT
Start: 2023-04-28

## 2023-04-28 RX ORDER — METOPROLOL SUCCINATE 25 MG/1
25 TABLET, EXTENDED RELEASE ORAL DAILY
Qty: 90 TABLET | Refills: 1 | Status: SHIPPED | OUTPATIENT
Start: 2023-04-28

## 2023-04-28 RX ORDER — OLMESARTAN MEDOXOMIL 40 MG/1
20 TABLET ORAL DAILY
Qty: 45 TABLET | Refills: 1 | Status: SHIPPED | OUTPATIENT
Start: 2023-04-28

## 2023-04-28 RX ORDER — SUMATRIPTAN 50 MG/1
TABLET, FILM COATED ORAL
Qty: 9 TABLET | Refills: 1 | Status: SHIPPED | OUTPATIENT
Start: 2023-04-28

## 2023-05-02 ENCOUNTER — PATIENT MESSAGE (OUTPATIENT)
Dept: ENDOCRINOLOGY CLINIC | Facility: CLINIC | Age: 58
End: 2023-05-02

## 2023-05-03 NOTE — TELEPHONE ENCOUNTER
From: Fer Henry  To: Cintia Mari MD  Sent: 5/2/2023 1:26 PM CDT  Subject: Prolia pre-authorization    Hello,    Following up to see if the pre-authorization has been submitted to [address] [place] [address] (new insurance effective 4/1/23). I'm not seeing anything showing up on the insurance website that one has been received by them. I'm due for the injection this month.     Thank you,  Delmi Nash  [phone]

## 2023-05-08 NOTE — TELEPHONE ENCOUNTER
Submitted PA via availity. Was advised to action is required since Dx is M81.0. Checked [employer] for benefits. Nothing available, still in process.

## 2023-05-09 RX ORDER — ERGOCALCIFEROL 1.25 MG/1
CAPSULE ORAL
Qty: 24 CAPSULE | Refills: 0 | Status: SHIPPED | OUTPATIENT
Start: 2023-05-09

## 2023-05-15 ENCOUNTER — NURSE ONLY (OUTPATIENT)
Dept: ENDOCRINOLOGY CLINIC | Facility: CLINIC | Age: 58
End: 2023-05-15

## 2023-05-15 DIAGNOSIS — M81.0 AGE-RELATED OSTEOPOROSIS WITHOUT CURRENT PATHOLOGICAL FRACTURE: Primary | ICD-10-CM

## 2023-05-15 NOTE — PROGRESS NOTES
Patient arrived at clinic for prolia injection - patient given and tolerated 60mg prolia injection to right upper arm  Patient stated understanding to return to clinic in 6 months for f/u with Dr. Shaheen Munoz and next prolia injection - f/u scheduled 11/16/23  Prolia labs ordered

## 2023-07-10 DIAGNOSIS — E78.5 DYSLIPIDEMIA, GOAL LDL BELOW 100: ICD-10-CM

## 2023-07-10 RX ORDER — ROSUVASTATIN CALCIUM 10 MG/1
10 TABLET, COATED ORAL NIGHTLY
Qty: 90 TABLET | Refills: 0 | Status: SHIPPED | OUTPATIENT
Start: 2023-07-10

## 2023-07-10 NOTE — TELEPHONE ENCOUNTER
Is this medication prescribed by the [facility] Providers? yes    Did the patient contact the pharmacy directly?:  yes    Is patient out of meds or supply very low?:  one week left    Medication Requested:  rosuvastatin 10 MG Oral Tab     Dose:  10 MG    Is patient requesting a 30 or 90 day supply?:  90 (see additional note)    Pharmacy name and phone # or location:  [facility] #[id number] - [address] [facility], [address] [place] [address], [phone], [phone]     Is the patient due for an appointment?: physical scheduled 10/27/23  (if so, please schedule appt)    Additional Notes:  A 90 day supply plus 1 refill was sent to [address] [place] [address] on 4/28/23. The pharmacy is out of the medication so she couldn't  the refill. Please send the refill to the [facility] #[id number] - [address], [phone], [phone] [[id number]]     Please advise the patient refills take up to 72 business hours.

## 2023-07-10 NOTE — TELEPHONE ENCOUNTER
Cholesterol Medication Protocol Fypmln54/10/2023 12:47 PM   Protocol Details ALT < 80    ALT resulted within past year    Lipid panel within past 12 months    Appointment within past 12 or next 3 months   2. Dyslipidemia, goal LDL below 100  Continue statin.   Future Appointments   Date Time Provider Thien Rosalesi   8/18/2023  2:00 PM [facility] HEALTH CARD RM3 NISA [place]   9/15/2023  1:00 PM Cecilia Fontenot MD [facility] EC ADO   10/27/2023  1:00 PM Travon Jordan MD [facility] [place]   11/16/2023  6:15 PM [facility]

## 2023-08-16 ENCOUNTER — TELEPHONE (OUTPATIENT)
Dept: INTERNAL MEDICINE CLINIC | Facility: CLINIC | Age: 58
End: 2023-08-16

## 2023-08-16 NOTE — TELEPHONE ENCOUNTER
Incoming (mail or fax):  fax  Received from:  [address] [place] [address]  Documentation given to:  Triage incoming    Approved Ultrasound of heart

## 2023-08-18 ENCOUNTER — HOSPITAL ENCOUNTER (OUTPATIENT)
Dept: CV DIAGNOSTICS | Facility: HOSPITAL | Age: 58
Discharge: HOME | End: 2023-08-18
Attending: INTERNAL MEDICINE
Payer: COMMERCIAL

## 2023-08-18 DIAGNOSIS — I34.0 MILD MITRAL REGURGITATION: ICD-10-CM

## 2023-08-18 PROCEDURE — 93306 TTE W/DOPPLER COMPLETE: CPT | Performed by: INTERNAL MEDICINE

## 2023-09-15 ENCOUNTER — OFFICE VISIT (OUTPATIENT)
Dept: GASTROENTEROLOGY | Facility: CLINIC | Age: 58
End: 2023-09-15

## 2023-09-15 VITALS — HEART RATE: 79 BPM | SYSTOLIC BLOOD PRESSURE: 130 MMHG | DIASTOLIC BLOOD PRESSURE: 83 MMHG

## 2023-09-15 DIAGNOSIS — K21.9 GASTROESOPHAGEAL REFLUX DISEASE WITHOUT ESOPHAGITIS: ICD-10-CM

## 2023-09-15 DIAGNOSIS — K76.0 FATTY LIVER DISEASE, NONALCOHOLIC: ICD-10-CM

## 2023-09-15 DIAGNOSIS — R10.13 EPIGASTRIC PAIN: ICD-10-CM

## 2023-09-15 DIAGNOSIS — R10.11 RUQ PAIN: Primary | ICD-10-CM

## 2023-09-15 PROCEDURE — 3079F DIAST BP 80-89 MM HG: CPT | Performed by: INTERNAL MEDICINE

## 2023-09-15 PROCEDURE — 99214 OFFICE O/P EST MOD 30 MIN: CPT | Performed by: INTERNAL MEDICINE

## 2023-09-15 PROCEDURE — 3075F SYST BP GE 130 - 139MM HG: CPT | Performed by: INTERNAL MEDICINE

## 2023-09-15 RX ORDER — PANTOPRAZOLE SODIUM 40 MG/1
TABLET, DELAYED RELEASE ORAL
Qty: 90 TABLET | Refills: 3 | Status: SHIPPED | OUTPATIENT
Start: 2023-09-15

## 2023-10-03 ENCOUNTER — PATIENT MESSAGE (OUTPATIENT)
Dept: INTERNAL MEDICINE CLINIC | Facility: CLINIC | Age: 58
End: 2023-10-03

## 2023-10-03 NOTE — TELEPHONE ENCOUNTER
From: Melvin Yanez  To: June Numbers  Sent: 10/3/2023 9:42 AM CDT  Subject: Next COVID Vaccine     Han, Dr Marla Raphael,    I have an appointment with you the end of October and am planning on getting the flu shot at that time. When should I get the next COVID vaccine?     Thank you,  Martin Soto

## 2023-10-03 NOTE — TELEPHONE ENCOUNTER
Pt advised latest covid booster recommended for everyone who has tolerated previously, can get at pharmacy or [place] location.

## 2023-10-15 LAB
ABSOLUTE BASOPHILS: 30 CELLS/UL (ref 0–200)
ABSOLUTE EOSINOPHILS: 104 CELLS/UL (ref 15–500)
ABSOLUTE LYMPHOCYTES: 1887 CELLS/UL (ref 850–3900)
ABSOLUTE MONOCYTES: 407 CELLS/UL (ref 200–950)
ABSOLUTE NEUTROPHILS: 4973 CELLS/UL (ref 1500–7800)
ALBUMIN/GLOBULIN RATIO: 1.8 (CALC) (ref 1–2.5)
ALBUMIN: 4.5 G/DL (ref 3.6–5.1)
ALKALINE PHOSPHATASE: 104 U/L (ref 37–153)
ALT: 14 U/L (ref 6–29)
AST: 16 U/L (ref 10–35)
BASOPHILS: 0.4 %
BILIRUBIN, TOTAL: 0.6 MG/DL (ref 0.2–1.2)
BUN: 13 MG/DL (ref 7–25)
CALCIUM: 9.3 MG/DL (ref 8.6–10.4)
CARBON DIOXIDE: 26 MMOL/L (ref 20–32)
CHLORIDE: 102 MMOL/L (ref 98–110)
CHOL/HDLC RATIO: 3.1 (CALC)
CHOLESTEROL, TOTAL: 197 MG/DL
CREATININE: 0.74 MG/DL (ref 0.5–1.03)
EGFR: 95 ML/MIN/1.73M2
EOSINOPHILS: 1.4 %
GLOBULIN: 2.5 G/DL (CALC) (ref 1.9–3.7)
GLUCOSE: 91 MG/DL (ref 65–99)
HDL CHOLESTEROL: 63 MG/DL
HEMATOCRIT: 41.8 % (ref 35–45)
HEMOGLOBIN A1C: 5.4 % OF TOTAL HGB
HEMOGLOBIN: 13.9 G/DL (ref 11.7–15.5)
LDL-CHOLESTEROL: 104 MG/DL (CALC)
LYMPHOCYTES: 25.5 %
MCH: 29.6 PG (ref 27–33)
MCHC: 33.3 G/DL (ref 32–36)
MCV: 88.9 FL (ref 80–100)
MONOCYTES: 5.5 %
MPV: 10.6 FL (ref 7.5–12.5)
NEUTROPHILS: 67.2 %
NON-HDL CHOLESTEROL: 134 MG/DL (CALC)
PLATELET COUNT: 318 THOUSAND/UL (ref 140–400)
POTASSIUM: 3.5 MMOL/L (ref 3.5–5.3)
PROTEIN, TOTAL: 7 G/DL (ref 6.1–8.1)
RDW: 12.4 % (ref 11–15)
RED BLOOD CELL COUNT: 4.7 MILLION/UL (ref 3.8–5.1)
SODIUM: 139 MMOL/L (ref 135–146)
TRIGLYCERIDES: 179 MG/DL
TSH W/REFLEX TO FT4: 2.95 MIU/L (ref 0.4–4.5)
WHITE BLOOD CELL COUNT: 7.4 THOUSAND/UL (ref 3.8–10.8)

## 2023-10-19 LAB
ALKALINE PHOSPHATASE, BONE SPECIFIC: 10.9 MCG/L (ref 5.6–29)
BUN: 12 MG/DL (ref 7–25)
CALCIUM: 9.1 MG/DL (ref 8.6–10.4)
CARBON DIOXIDE: 24 MMOL/L (ref 20–32)
CHLORIDE: 103 MMOL/L (ref 98–110)
CREATININE: 0.74 MG/DL (ref 0.5–1.03)
EGFR: 95 ML/MIN/1.73M2
GLUCOSE: 88 MG/DL (ref 65–99)
PARATHYROID HORMONE,$INTACT: 70 PG/ML (ref 16–77)
POTASSIUM: 3.6 MMOL/L (ref 3.5–5.3)
SODIUM: 139 MMOL/L (ref 135–146)
VITAMIN D, 25-OH, TOTAL: 62 NG/ML (ref 30–100)

## 2023-10-26 ENCOUNTER — TELEPHONE (OUTPATIENT)
Dept: ENDOCRINOLOGY CLINIC | Facility: CLINIC | Age: 58
End: 2023-10-26

## 2023-10-26 NOTE — TELEPHONE ENCOUNTER
Last prolia shot 05/15/23. Patient will be due next after 11/15/23. Submitted IV for Prolia. Waiting on SOB, 2-5 business days.

## 2023-10-27 ENCOUNTER — OFFICE VISIT (OUTPATIENT)
Dept: INTERNAL MEDICINE CLINIC | Facility: CLINIC | Age: 58
End: 2023-10-27

## 2023-10-27 VITALS
OXYGEN SATURATION: 96 % | RESPIRATION RATE: 16 BRPM | SYSTOLIC BLOOD PRESSURE: 122 MMHG | TEMPERATURE: 98 F | HEART RATE: 92 BPM | BODY MASS INDEX: 46.04 KG/M2 | DIASTOLIC BLOOD PRESSURE: 72 MMHG | HEIGHT: 63 IN | WEIGHT: 259.81 LBS

## 2023-10-27 DIAGNOSIS — Z12.31 ENCOUNTER FOR SCREENING MAMMOGRAM FOR MALIGNANT NEOPLASM OF BREAST: ICD-10-CM

## 2023-10-27 DIAGNOSIS — G43.009 MIGRAINE WITHOUT AURA AND WITHOUT STATUS MIGRAINOSUS, NOT INTRACTABLE: ICD-10-CM

## 2023-10-27 DIAGNOSIS — E78.5 DYSLIPIDEMIA, GOAL LDL BELOW 100: ICD-10-CM

## 2023-10-27 DIAGNOSIS — I10 ESSENTIAL HYPERTENSION: ICD-10-CM

## 2023-10-27 DIAGNOSIS — Z00.00 PHYSICAL EXAM, ANNUAL: Primary | ICD-10-CM

## 2023-10-27 DIAGNOSIS — Z23 NEED FOR VACCINATION: ICD-10-CM

## 2023-10-27 RX ORDER — METOPROLOL SUCCINATE 25 MG/1
25 TABLET, EXTENDED RELEASE ORAL DAILY
Qty: 90 TABLET | Refills: 1 | Status: SHIPPED | OUTPATIENT
Start: 2023-10-27

## 2023-10-27 RX ORDER — ROSUVASTATIN CALCIUM 10 MG/1
10 TABLET, COATED ORAL NIGHTLY
Qty: 90 TABLET | Refills: 1 | Status: SHIPPED | OUTPATIENT
Start: 2023-10-27

## 2023-10-27 RX ORDER — OLMESARTAN MEDOXOMIL 40 MG/1
20 TABLET ORAL DAILY
Qty: 45 TABLET | Refills: 1 | Status: SHIPPED | OUTPATIENT
Start: 2023-10-27

## 2023-10-27 RX ORDER — HYDROCHLOROTHIAZIDE 25 MG/1
25 TABLET ORAL DAILY
Qty: 90 TABLET | Refills: 1 | Status: SHIPPED | OUTPATIENT
Start: 2023-10-27

## 2023-10-27 RX ORDER — SUMATRIPTAN 50 MG/1
TABLET, FILM COATED ORAL
Qty: 9 TABLET | Refills: 1 | Status: SHIPPED | OUTPATIENT
Start: 2023-10-27

## 2023-10-30 NOTE — TELEPHONE ENCOUNTER
Medication  CGM  pump supply Requested: (Denosumab)  Prolia                                                             CoverMyMeds Used: No    Key: N/a    Sig: Inject 60mg/ mL into the skin every 6 months. DX Code: M81.0                                       Case Number/Pending Ref#: N/a      Routing over to [place] Dept for assistance, thanks.

## 2023-11-16 ENCOUNTER — OFFICE VISIT (OUTPATIENT)
Dept: ENDOCRINOLOGY CLINIC | Facility: CLINIC | Age: 58
End: 2023-11-16
Payer: COMMERCIAL

## 2023-11-16 VITALS — DIASTOLIC BLOOD PRESSURE: 89 MMHG | SYSTOLIC BLOOD PRESSURE: 132 MMHG | HEART RATE: 89 BPM

## 2023-11-16 DIAGNOSIS — M81.0 AGE-RELATED OSTEOPOROSIS WITHOUT CURRENT PATHOLOGICAL FRACTURE: Primary | ICD-10-CM

## 2023-11-16 DIAGNOSIS — E66.9 OBESITY (BMI 35.0-39.9 WITHOUT COMORBIDITY): ICD-10-CM

## 2023-11-16 DIAGNOSIS — E55.9 VITAMIN D DEFICIENCY: ICD-10-CM

## 2023-11-16 PROCEDURE — 3075F SYST BP GE 130 - 139MM HG: CPT | Performed by: INTERNAL MEDICINE

## 2023-11-16 PROCEDURE — 99214 OFFICE O/P EST MOD 30 MIN: CPT | Performed by: INTERNAL MEDICINE

## 2023-11-16 PROCEDURE — 96372 THER/PROPH/DIAG INJ SC/IM: CPT | Performed by: INTERNAL MEDICINE

## 2023-11-16 PROCEDURE — 3079F DIAST BP 80-89 MM HG: CPT | Performed by: INTERNAL MEDICINE

## 2023-11-16 RX ORDER — ERGOCALCIFEROL 1.25 MG/1
CAPSULE ORAL
Qty: 24 CAPSULE | Refills: 2 | Status: SHIPPED | OUTPATIENT
Start: 2023-11-16

## 2023-11-17 ENCOUNTER — TELEPHONE (OUTPATIENT)
Dept: INTERNAL MEDICINE CLINIC | Facility: CLINIC | Age: 58
End: 2023-11-17

## 2023-11-17 NOTE — PROGRESS NOTES
Name: Keira Espinosa  Date: 11/16/2023      HISTORY OF PRESENT ILLNESS   Keira Espinosa is a 65year old female who presents for   Chief Complaint   Patient presents with    Osteoporosis     Prolia shot. 64 y/o F presents for follow up evaluation of osteoporosis. She underwent screening DEXA which demonstrated significant bone loss in her spine. She is currently postmenopausal and she has not had regular cycles in the past 5 years. She does have history of stress fracture in the ankle during exercise. She does have history of nephrolithiasis in 2001. No known family h/o nephrolithiasis. She is currently maintained on Vitamin D 1000 units daily and her level in 8/2014 was 44. She is having greek yogurt daily and occ almond milk. Approx 1500mg of calcium in the diet. But previously low calcium intake as a younger adult. She has been maintained on Prolia and she is tolerating well. Although she is having some myalgias after injection. She is maintained on Vitamin D supplementation but no calcium, dietary calcium. No fractures. She is currently feeling well. She is receiving prolia injection #10 given in December 2019. She was then transitioned off prolia with Reclast infusion in 8/2020. However she developed severe side effects to Reclast including fever and severe joint pain/myalgias. She is now feeling improved. No falls or fractures since last visit. Of note she does have history of significant Vitamin D deficiency maintained on 1 tablet every 5 days. Her DEXA does demonstrate a decline in BMD over the past 2 years despite persistent therapy. Since last visit she has been restarted on Prolia 4/30/2021 and overall tolerated well. No falls or fractures. She did have severe reaction to reclast with persistent myalgias for approximately 11 months. Labs are improved since restarting prolia therapy. She has now received 6 injections of prolia - no fractures.   She did have a fall but no broken bones. Strong family h/o osteoporosis, Aunt compression fractures      REVIEW OF SYSTEMS  Eyes: no change in vision  Neurologic: no headache, generalized or focal weakness or numbness. Head: normal  ENT: normal  Lungs: no shortness of breath, wheezing or BADILLO  Cardiovascular:  no chest pain or palpitations  Gastrointestinal:  no abdominal pain, bowel movement problems  Musculoskeletal: no muscle pain or arthralgia  /Gyne: no frequency or discomfort while urinating  Psychiatric:  no acute distress, anxiety  or depression  Skin: normal moisturized skin    Medications:     Current Outpatient Medications:     hydroCHLOROthiazide 25 MG Oral Tab, Take 1 tablet (25 mg total) by mouth daily. , Disp: 90 tablet, Rfl: 1    metoprolol succinate ER 25 MG Oral Tablet 24 Hr, Take 1 tablet (25 mg total) by mouth daily. , Disp: 90 tablet, Rfl: 1    Olmesartan Medoxomil 40 MG Oral Tab, Take 0.5 tablets (20 mg total) by mouth daily. , Disp: 45 tablet, Rfl: 1    rosuvastatin 10 MG Oral Tab, Take 1 tablet (10 mg total) by mouth nightly., Disp: 90 tablet, Rfl: 1    SUMAtriptan (IMITREX) 50 MG Oral Tab, Use at onset; repeat once after 2 HRS-ONLY 2 IN 24 HR MAX, Disp: 9 tablet, Rfl: 1    pantoprazole 40 MG Oral Tab EC, TAKE 1 TABLET BY MOUTH EVERY MORNING BEFORE BREAKFAST, Disp: 90 tablet, Rfl: 3    ergocalciferol 1.25 MG (32727 UT) Oral Cap, TAKE 2 CAPSULES BY MOUTH ONE TIME WEEKLY, Disp: 24 capsule, Rfl: 0    PROLIA 60 MG/ML Subcutaneous Solution Prefilled Syringe, INJECT 1 ML (60 MG TOTAL) UNDER THE SKIN ONE TIME FOR 1 DOSE, Disp: 1 mL, Rfl: 0    POTASSIUM OR, Take 99 mg by mouth daily. , Disp: , Rfl:     Multiple Vitamins-Minerals (MULTI-VITAMIN/MINERALS) Oral Tab, Take 1 tablet by mouth daily. , Disp: , Rfl:     Misc Natural Products (GLUCOSAMINE CHOND MSM FORMULA OR), Take by mouth., Disp: , Rfl:      Allergies:   No Known Allergies    Social History:   Social History     Socioeconomic History    Marital status:    Tobacco Use    Smoking status: Never     Passive exposure: Never    Smokeless tobacco: Never   Vaping Use    Vaping Use: Never used   Substance and Sexual Activity    Alcohol use: Yes     Alcohol/week: 2.0 standard drinks of alcohol     Types: 2 Glasses of wine per week     Comment: Social drinker    Drug use: No    Sexual activity: Not Currently   Other Topics Concern    Caffeine Concern No    Stress Concern Yes    Weight Concern Yes    Special Diet No    Exercise Yes    Seat Belt Yes    Reaction to local anesthetic No       Medical History:   Past Medical History:   Diagnosis Date    Achilles bursitis or tendinitis 3/19/2013    Acne 5/10/2007    Actinic keratosis 12/5/2005    Allergic rhinitis 1976    Anemia 03/2021    Anxiety 1996    Arthritis 2010    osteoarthritis in knees    Back pain 8/18/2015    Calcaneal spur 5/4/2013    Calculus of kidney     Colitis     2012    Diverticulosis of large intestine     diverticulitis 2012 per pt    Esophageal reflux     Hallux rigidus 5/4/2013    Herpes zoster 11/30/2009    Hiatal hernia     High cholesterol     [facility] of stomach ulcers     in [facility]    Lumbar radiculopathy     Migraine without aura and without status migrainosus, not intractable 3/15/2022    Noninfectious gastroenteritis and colitis 4/23/2012    Nonrheumatic mitral valve regurgitation 3/18/2022    mild    Nonspecific elevation of level of transaminase or lactic acid dehydrogenase (LDH) 9/13/2013    Obesity, unspecified     Osteoarthritis     Osteoporosis     Other and unspecified hyperlipidemia     Palpitations 11/26/2007    Unspecified essential hypertension     Visual impairment     glasses       Surgical history:   Past Surgical History:   Procedure Laterality Date    CHOLECYSTECTOMY      COLONOSCOPY N/A 05/04/2021    Procedure: COLONOSCOPY/ESOPHAGOGASTRODUODENOSCOPY (EGD);   Surgeon: Tj Thorpe MD;  Location: [facility] ENDOSCOPY    EGD  2021    FUSION FOOT BONE,MIDTARSAL,1 JT Left 2020    LEEP  2004      1991    And 1992    OTHER Bilateral 2020    Joint Fusion 1st Metataral bilateral    OTHER SURGICAL HISTORY  Big toe joint fusion,     SKIN SURGERY  2019    Biopsy-benign    TONSILLECTOMY  1972       PHYSICAL EXAM  /89   Pulse 89   LMP 2014 (Approximate)     General Appearance:  alert, well developed, in no acute distress  Eyes:  normal conjunctivae, sclera. , normal sclera and normal pupils  Throat/Neck: normal sound to voice. Back: no kyphosis  Respiratory:  non-labored. no increased work of breathing. Lymph Nodes:  No abnormal nodes noted  Skin:  normal moisture and skin texture  Hematologic:  no excessive bruising  Psychiatric:  oriented to time, self, and place    ASSESSMENT/PLAN:    1. Osteoporosis  -Diagnosed with significant osteoporosis  -She has finished 5 years of prolia and DEXA significantly improved  -She was transitioned off prolia with a dose of Reclast in 2020  -However she developed significant side effects with medication and DEXA BMD has declined  -Recheck PTH, Vitamin D, Bone specific alk phos, BMP   -Given rebound bone loss after prolia, restarted therapy  -DEXA 2023 was improved   -Continue Vitamin D     2. Secondary Hyperparathyroidism  -Due to Vitamin D deficiency  -Trended her PTH levels over the past 4 years and direct correlation between PTH and Vitamin D  -Continue Ergocalciferol to weekly   -Normal calcium level    3.  Obesity  - Discussed weight loss with patient  - Discussed GLP-1 options  - Verbalized understanding of risks and benefits     RTC 1 year or 6 months if starting GLP-1     2023  Fior Mi MD

## 2023-11-17 NOTE — TELEPHONE ENCOUNTER
Incoming (mail or fax):   Mail  Received from:  [facility]  Documentation given to:  Triage

## 2023-11-24 ENCOUNTER — HOSPITAL ENCOUNTER (OUTPATIENT)
Dept: MAMMOGRAPHY | Age: 58
Discharge: HOME | End: 2023-11-24
Attending: INTERNAL MEDICINE
Payer: COMMERCIAL

## 2023-11-24 DIAGNOSIS — Z12.31 ENCOUNTER FOR SCREENING MAMMOGRAM FOR MALIGNANT NEOPLASM OF BREAST: ICD-10-CM

## 2023-11-24 PROCEDURE — 77063 BREAST TOMOSYNTHESIS BI: CPT | Performed by: INTERNAL MEDICINE

## 2023-11-24 PROCEDURE — 77067 SCR MAMMO BI INCL CAD: CPT | Performed by: INTERNAL MEDICINE

## 2024-02-02 ENCOUNTER — PATIENT MESSAGE (OUTPATIENT)
Dept: ENDOCRINOLOGY CLINIC | Facility: CLINIC | Age: 59
End: 2024-02-02

## 2024-02-02 RX ORDER — TIRZEPATIDE 5 MG/.5ML
5 INJECTION, SOLUTION SUBCUTANEOUS WEEKLY
Qty: 2 ML | Refills: 0 | Status: SHIPPED | OUTPATIENT
Start: 2024-03-01

## 2024-02-02 RX ORDER — TIRZEPATIDE 2.5 MG/.5ML
2.5 INJECTION, SOLUTION SUBCUTANEOUS WEEKLY
Qty: 2 ML | Refills: 0 | Status: SHIPPED | OUTPATIENT
Start: 2024-02-02

## 2024-02-02 NOTE — TELEPHONE ENCOUNTER
From: Kianna Yanez  To: Charity King  Sent: 2/2/2024 9:40 AM CST  Subject: Zepbound    Hello Dr. King,    I am ready to get started on Zepbound or equivalent. Not sure what is easiest to get filled. I signed up for the Zepbound savings card. My insurance does not cover this.    My local pharmacy is [facility], [address]. But if you are familiar with other local pharmacies that have it more readily available I am open.    Thank you,  Kianna  [phone]

## 2024-02-08 ENCOUNTER — PATIENT MESSAGE (OUTPATIENT)
Dept: ENDOCRINOLOGY CLINIC | Facility: CLINIC | Age: 59
End: 2024-02-08

## 2024-03-03 ENCOUNTER — PATIENT MESSAGE (OUTPATIENT)
Dept: ENDOCRINOLOGY CLINIC | Facility: CLINIC | Age: 59
End: 2024-03-03

## 2024-03-04 RX ORDER — TIRZEPATIDE 5 MG/.5ML
5 INJECTION, SOLUTION SUBCUTANEOUS WEEKLY
Qty: 2 ML | Refills: 0 | Status: SHIPPED | OUTPATIENT
Start: 2024-03-04

## 2024-03-04 NOTE — TELEPHONE ENCOUNTER
From: Kianna Yanez  To: Charity King  Sent: 3/3/2024 9:30 AM CST  Subject: Zepbound 5mg    I am ready to start the 5MG. Unfortunately they need BMI and diagnosis code for it. They need it for each prescription. It was updated for the 2.5 but not the 5.     Please let me know when that is complete so I can go to the pharmacy.     Thank you!!

## 2024-03-14 ENCOUNTER — PATIENT MESSAGE (OUTPATIENT)
Dept: ENDOCRINOLOGY CLINIC | Facility: CLINIC | Age: 59
End: 2024-03-14

## 2024-03-14 DIAGNOSIS — E66.9 OBESITY (BMI 35.0-39.9 WITHOUT COMORBIDITY): Primary | ICD-10-CM

## 2024-03-16 NOTE — TELEPHONE ENCOUNTER
From: Kianna Yanez  To: Charity King  Sent: 3/14/2024 3:55 PM CDT  Subject: Next Zebpound dosage    I started 5.0mg on Monday.     My next step was to contact for next prescription - when should I do that? Will you automatically move me to 7.5 or higher or wait until I'm on 5.0 for a week or two? We just need the diagnosis and BMI on each prescription, please.     My second question is that I have started the request with [employer] to get this medication covered. My plan doesn't allow an appeals process, per se, but I can request it and see what happens. The first step is that they faxed a form to your office to fill out. They used the fax number that [facility] had on file for you. Hoping that is correct. If you could let me know if you don't receive it and what number should be used to fax, I can have it re-sent. I appreciate your assistance in this process.     One additional comment is that I did schedule a follow up appointment for 6 months from the time I started per our conversation at my last appointment with you. I want to make sure that is still your standard protocol.     Thank you again. So far I am really hopeful with this medication. Food cravings are almost non-existent and I don't feel hungry even though it is hours (up to 19) between dinner and my first meal of the day.     Kianna

## 2024-03-16 NOTE — TELEPHONE ENCOUNTER
SH - questions from pt about next dose level. Please advise, ty!    LOV 11/16/23   RTC 6 years (months?)    Future Appointments   Date Time Provider Department Center   4/5/2024  1:00 PM Bowen Sanchez MD [facility]   5/3/2024  1:00 PM Lili Odell MD [facility] [place]   8/15/2024  5:45 PM Charity King MD [facility]   8/23/2024  1:00 PM Bowen Sanchez MD [facility]

## 2024-03-18 RX ORDER — TIRZEPATIDE 7.5 MG/.5ML
7.5 INJECTION, SOLUTION SUBCUTANEOUS WEEKLY
Qty: 2 ML | Refills: 0 | Status: SHIPPED | OUTPATIENT
Start: 2024-03-18

## 2024-03-18 RX ORDER — TIRZEPATIDE 7.5 MG/.5ML
7.5 INJECTION, SOLUTION SUBCUTANEOUS WEEKLY
Qty: 2 ML | Refills: 0 | Status: SHIPPED | OUTPATIENT
Start: 2024-03-18 | End: 2024-03-18

## 2024-03-22 ENCOUNTER — MED REC SCAN ONLY (OUTPATIENT)
Dept: ENDOCRINOLOGY CLINIC | Facility: CLINIC | Age: 59
End: 2024-03-22

## 2024-03-22 NOTE — TELEPHONE ENCOUNTER
Coverage Exception form completed for Zepbound and faxed to [employer] (fax # [phone]) along with OV note dtd 11/16/23 and 10/27/23 from Dr. Odell (includes weight hx)    MC sent updating patient    Placed in brown accordion folder

## 2024-03-25 NOTE — TELEPHONE ENCOUNTER
Received fax from [employer] in regards to Zepbound 7.5MG/0.5ML. Medication has been denied because \" the requested drug must be covered under your pharmacy [employer]fit. We received a request for a weight loss drug, which is not covered under your pharmacy benefit\". ViaView message sent to pt and denial letter placed in providers folder for review. Message also routed to provider.     Case # - [id number]

## 2024-04-09 NOTE — TELEPHONE ENCOUNTER
GI RNs–    See below. Please call and schedule Ms. Yanez for follow-up visit with me in 2–3 months. right normal/left normal

## 2024-04-10 NOTE — TELEPHONE ENCOUNTER
From: Kianna Yanez  To: Charity King  Sent: 2/8/2024 3:03 PM CST  Subject: Zepbound - diagnosis code    Hello Dr. King,    I didn't receive a response from the reply I sent on Monday so thought I would start a new message.    I can get Zepbound from [facility] at [address] [facility].    In order to use the ’s savings card they need the diagnosis of obesity and BMI included on the prescription. Would you please provide those prescriptions with those to [facility]?     Thank you,   Kianna   Returned call to pt . Pt states someone called him but he lost the message. He wanted to know what we wanted with his device. Explained he is connected per 3/22/24 TE. Pt states he will be back home in about 2 weeks. No further questions or concerns. Pippa Resendez RN    Encounter Date: 3/20/2024    Information obtained from [employer]. Pt is connected.

## 2024-04-19 ENCOUNTER — PATIENT MESSAGE (OUTPATIENT)
Dept: ENDOCRINOLOGY CLINIC | Facility: CLINIC | Age: 59
End: 2024-04-19

## 2024-04-20 NOTE — TELEPHONE ENCOUNTER
From: Kianna Yanez  To: Charity King  Sent: 4/19/2024 11:15 AM CDT  Subject: Zepbound 10 mg    I am ready for the 10mg prescription to be sent. Will you submit it for refills to get me to my 6 month appointment?    Thank you,   Kianna

## 2024-04-22 RX ORDER — TIRZEPATIDE 10 MG/.5ML
10 INJECTION, SOLUTION SUBCUTANEOUS WEEKLY
Qty: 2 ML | Refills: 5 | Status: SHIPPED | OUTPATIENT
Start: 2024-04-22

## 2024-04-25 ENCOUNTER — PATIENT MESSAGE (OUTPATIENT)
Dept: ENDOCRINOLOGY CLINIC | Facility: CLINIC | Age: 59
End: 2024-04-25

## 2024-04-26 ENCOUNTER — TELEPHONE (OUTPATIENT)
Dept: ENDOCRINOLOGY CLINIC | Facility: CLINIC | Age: 59
End: 2024-04-26

## 2024-04-26 NOTE — TELEPHONE ENCOUNTER
Pt's last Prolia injection was on 11/16/2023 with SH. Pt  will be due for next injection on or after 05/17/2024.     Pt has an upcoming appt on 05/20/2024,  for Prolia Injection with RN.      Submitted Prolia IV via [employer] portal  Awaiting SOB, 3-5 business days

## 2024-04-26 NOTE — TELEPHONE ENCOUNTER
----- Message from Jeovanny Allred CMA sent at 11/16/2023  6:35 PM CST -----  Regarding: Akbar  Last shot 11/16/23.

## 2024-04-26 NOTE — TELEPHONE ENCOUNTER
From: Kianna Yanez  To: Charity King  Sent: 4/25/2024 10:49 AM CDT  Subject: Next Prolia Shot    Hello,    My last Prolia Shot was 11/16.    I am getting a new crown started tomorrow (tried to time late in my 6 month Prolia cycle). It will be permanently cemented on Friday, 5/17.     Timing should be perfect for when I need to schedule my next shot.    Could I get that scheduled for Tuesday, 5/21 morning or Friday, 5/24, afternoon?    I can go to [place] like I did last spring.    Thank you,   Kianna

## 2024-04-26 NOTE — TELEPHONE ENCOUNTER
Dr. King,     Please see below message.   Per LOV note in November, you wanted to see pt 6 months from that date if pt starting on GLP-1, pt is on Zepbound.   Please confirm if ok to schedule with RN for the prolia shot in May since you don't have any opening/MD approval/res24 spot until July.  Thank you.     CMA/RMA - please assist in scheduling pt once response is received from provider.  Pt would also need insurance verification if not done yet.  Thank you.

## 2024-05-03 ENCOUNTER — OFFICE VISIT (OUTPATIENT)
Dept: INTERNAL MEDICINE CLINIC | Facility: CLINIC | Age: 59
End: 2024-05-03
Payer: COMMERCIAL

## 2024-05-03 VITALS
SYSTOLIC BLOOD PRESSURE: 118 MMHG | HEIGHT: 62.5 IN | BODY MASS INDEX: 41.05 KG/M2 | DIASTOLIC BLOOD PRESSURE: 78 MMHG | HEART RATE: 96 BPM | WEIGHT: 228.81 LBS | RESPIRATION RATE: 12 BRPM | TEMPERATURE: 98 F

## 2024-05-03 DIAGNOSIS — M81.0 AGE-RELATED OSTEOPOROSIS WITHOUT CURRENT PATHOLOGICAL FRACTURE: ICD-10-CM

## 2024-05-03 DIAGNOSIS — K21.9 GASTROESOPHAGEAL REFLUX DISEASE WITHOUT ESOPHAGITIS: ICD-10-CM

## 2024-05-03 DIAGNOSIS — E78.5 DYSLIPIDEMIA, GOAL LDL BELOW 100: ICD-10-CM

## 2024-05-03 DIAGNOSIS — E87.6 HYPOKALEMIA: ICD-10-CM

## 2024-05-03 DIAGNOSIS — G43.009 MIGRAINE WITHOUT AURA AND WITHOUT STATUS MIGRAINOSUS, NOT INTRACTABLE: ICD-10-CM

## 2024-05-03 DIAGNOSIS — E66.01 CLASS 3 SEVERE OBESITY DUE TO EXCESS CALORIES WITH SERIOUS COMORBIDITY AND BODY MASS INDEX (BMI) OF 40.0 TO 44.9 IN ADULT (HCC): ICD-10-CM

## 2024-05-03 DIAGNOSIS — I10 ESSENTIAL HYPERTENSION: ICD-10-CM

## 2024-05-03 DIAGNOSIS — I34.0 MILD MITRAL REGURGITATION: ICD-10-CM

## 2024-05-03 PROCEDURE — 99214 OFFICE O/P EST MOD 30 MIN: CPT | Performed by: INTERNAL MEDICINE

## 2024-05-03 PROCEDURE — 3078F DIAST BP <80 MM HG: CPT | Performed by: INTERNAL MEDICINE

## 2024-05-03 PROCEDURE — 3008F BODY MASS INDEX DOCD: CPT | Performed by: INTERNAL MEDICINE

## 2024-05-03 PROCEDURE — 3074F SYST BP LT 130 MM HG: CPT | Performed by: INTERNAL MEDICINE

## 2024-05-03 RX ORDER — ERGOCALCIFEROL 1.25 MG/1
50000 CAPSULE ORAL WEEKLY
COMMUNITY

## 2024-05-03 RX ORDER — HYDROCHLOROTHIAZIDE 25 MG/1
25 TABLET ORAL DAILY
Qty: 90 TABLET | Refills: 1 | Status: SHIPPED | OUTPATIENT
Start: 2024-05-03

## 2024-05-03 RX ORDER — METOPROLOL SUCCINATE 25 MG/1
25 TABLET, EXTENDED RELEASE ORAL DAILY
Qty: 90 TABLET | Refills: 1 | Status: SHIPPED | OUTPATIENT
Start: 2024-05-03

## 2024-05-03 RX ORDER — POTASSIUM CHLORIDE 750 MG/1
10 TABLET, EXTENDED RELEASE ORAL DAILY
Qty: 90 TABLET | Refills: 0 | Status: SHIPPED | OUTPATIENT
Start: 2024-05-03

## 2024-05-03 RX ORDER — ROSUVASTATIN CALCIUM 5 MG/1
10 TABLET, COATED ORAL NIGHTLY
Qty: 90 TABLET | Refills: 1 | Status: SHIPPED | OUTPATIENT
Start: 2024-05-03

## 2024-05-03 NOTE — TELEPHONE ENCOUNTER
Received SOB VIA FAX DATED ON 04/30/24    NO PA REQUIRED    OOPCOST;50%    FACILITY FEE;NA    ADMIN FEE;50%

## 2024-05-03 NOTE — PROGRESS NOTES
[facility]    CHIEF COMPLAINT:    Chief Complaint   Patient presents with    Follow - Up     10/7/22-pap. 11/24/23-mammo. 5/4/21-colon-repeat 5 years         HISTORY OF PRESENT ILLNESS:  here for med check.   Htn: Taking meds regularly. No chest pain, no shortness of breath. Has been low at home. She has been holding the metoprolol per instructions on 4/2/24. Readings better with holding the metoprolol but HR Is high.   She does not want to hold the hydrochlorothiazide. She was given this medication by endocrine when she was diagnosed with osteoporosis as it holds on to calcium.      Hyperlipidemia: on statin. No muscle aches.       Gerd: on ppi. Has to be on it lifelong.      Osteoporosis: seeing endocrine. On prolia.      Migraines: get it very infrequently. Takes imitrex very sparingly. Works well for her. Needs a refill.      Mild mitral regurg on last echo. No shortness of breath.     Seeing the weight loss clinic. Is on zepbound. Has lost a lot of weight.      REVIEW OF SYSTEMS:  See HPI    Current Medications:    Current Outpatient Medications   Medication Sig Dispense Refill    ergocalciferol 1.25 MG (49633 UT) Oral Cap Take 1 capsule (50,000 Units total) by mouth once a week.      metoprolol succinate ER 25 MG Oral Tablet 24 Hr Take 1 tablet (25 mg total) by mouth daily. 90 tablet 1    hydroCHLOROthiazide 25 MG Oral Tab Take 1 tablet (25 mg total) by mouth daily. 90 tablet 1    rosuvastatin 5 MG Oral Tab Take 2 tablets (10 mg total) by mouth nightly. 90 tablet 1    potassium chloride 10 MEQ Oral Tab CR Take 1 tablet (10 mEq total) by mouth daily. 90 tablet 0    Tirzepatide-Weight Management (ZEPBOUND) 10 MG/0.5ML Subcutaneous Solution Auto-injector Inject 10 mg into the skin once a week. 2 mL 5    SUMAtriptan (IMITREX) 50 MG Oral Tab Use at onset; repeat once after 2 HRS-ONLY 2 IN 24 HR MAX 9 tablet 1    pantoprazole 40 MG Oral Tab EC TAKE 1 TABLET BY MOUTH EVERY MORNING BEFORE BREAKFAST 90 tablet  3    PROLIA 60 MG/ML Subcutaneous Solution Prefilled Syringe INJECT 1 ML (60 MG TOTAL) UNDER THE SKIN ONE TIME FOR 1 DOSE 1 mL 0    Multiple Vitamins-Minerals (MULTI-VITAMIN/MINERALS) Oral Tab Take 1 tablet by mouth daily.      Misc Natural Products (GLUCOSAMINE CHOND MSM FORMULA OR) Take by mouth.         PAST MEDICAL, SOCIAL, AND FAMILY HISTORY:  Tobacco:    History   Smoking Status    Never   Smokeless Tobacco    Never       PHYSICAL EXAM:  /78   Pulse 96   Temp 97.6 °F (36.4 °C) (Temporal)   Resp 12   Ht 5' 2.5\" (1.588 m)   Wt 228 lb 12.8 oz (103.8 kg)   LMP 04/01/2014 (Approximate)   Breastfeeding No   BMI 41.18 kg/m²    GENERAL: well developed, well nourished,in no apparent distress  LUNGS: clear to auscultation  CARDIO: RRR without murmur  GI: good BS's,no masses, HSM or tenderness  MUSCULOSKELETAL:  no edema, muscle strength normal.     DATA:  Results for orders placed or performed in visit on 04/22/24   POTASSIUM [733] [Q]   Result Value Ref Range    POTASSIUM 3.5 3.5 - 5.3 mmol/L            ASSESSMENT AND PLAN:  1. Essential hypertension  Stop olmesartan.   Restart metoprolol.   Continue hydrochlorothiazide.   Start K supplement.   Recheck K in a month.   - metoprolol succinate ER 25 MG Oral Tablet 24 Hr; Take 1 tablet (25 mg total) by mouth daily.  Dispense: 90 tablet; Refill: 1  - hydroCHLOROthiazide 25 MG Oral Tab; Take 1 tablet (25 mg total) by mouth daily.  Dispense: 90 tablet; Refill: 1  - Basic Metabolic Panel (8) [E]  Send me bp readings in a month. If still low we can make further adjustments.     2. Dyslipidemia, goal LDL below 100  - rosuvastatin 5 MG Oral Tab; Take 2 tablets (10 mg total) by mouth nightly.  Dispense: 90 tablet; Refill: 1    3. Hypokalemia  Start K supplement.   - potassium chloride 10 MEQ Oral Tab CR; Take 1 tablet (10 mEq total) by mouth daily.  Dispense: 90 tablet; Refill: 0    4. Gastroesophageal reflux disease without esophagitis  Continue pantoprazole.     5.  Age-related osteoporosis without current pathological fracture  Prolia per endocrine.     6. Migraine without aura and without status migrainosus, not intractable  Stable. Continue current management.      7. Mild mitral regurgitation  Echo with trivial regurg 8/2023.     8. Class 3 severe obesity due to excess calories with serious comorbidity and body mass index (BMI) of 40.0 to 44.9 in adult (HCC)  follow up with the weight loss clinic.       Return in about 6 months (around 11/3/2024) for physical. Send me bp readings in a month.       Lili Odell MD

## 2024-05-20 ENCOUNTER — NURSE ONLY (OUTPATIENT)
Dept: ENDOCRINOLOGY CLINIC | Facility: CLINIC | Age: 59
End: 2024-05-20

## 2024-05-20 DIAGNOSIS — M81.0 AGE-RELATED OSTEOPOROSIS WITHOUT CURRENT PATHOLOGICAL FRACTURE: Primary | ICD-10-CM

## 2024-05-20 NOTE — PROGRESS NOTES
Pt tolerated well 60 mg of prolia in left upper arm with no adverse effects.       Pt was informed to make follow up appt with Dr. King and next injection within 6 months and 1 day for insurance purposes, pt understood and had no additional questions or concerns at this time.

## 2024-06-02 LAB
BUN: 21 MG/DL (ref 7–25)
CALCIUM: 10.2 MG/DL (ref 8.6–10.4)
CARBON DIOXIDE: 28 MMOL/L (ref 20–32)
CHLORIDE: 95 MMOL/L (ref 98–110)
CREATININE: 0.86 MG/DL (ref 0.5–1.03)
EGFR: 79 ML/MIN/1.73M2
GLUCOSE: 89 MG/DL (ref 65–99)
POTASSIUM: 3.5 MMOL/L (ref 3.5–5.3)
SODIUM: 136 MMOL/L (ref 135–146)

## 2024-06-03 DIAGNOSIS — E87.6 HYPOKALEMIA: Primary | ICD-10-CM

## 2024-06-03 RX ORDER — POTASSIUM CHLORIDE 750 MG/1
10 TABLET, EXTENDED RELEASE ORAL 2 TIMES DAILY
Qty: 180 TABLET | Refills: 0 | Status: SHIPPED | OUTPATIENT
Start: 2024-06-03

## 2024-07-06 LAB — POTASSIUM: 4 MMOL/L (ref 3.5–5.3)

## 2024-07-11 NOTE — TELEPHONE ENCOUNTER
Pt states she advertently refilled the Olmesartan 40mg and got the 90 days supply from [facility]. Did not refill the Valsartan 320mg.      Dr Frank Sommers, pt prefers to take the Olmesartan 40mg since she already paid it and didn't want to waste if ok None

## 2024-07-22 ENCOUNTER — TELEPHONE (OUTPATIENT)
Dept: ENDOCRINOLOGY CLINIC | Facility: CLINIC | Age: 59
End: 2024-07-22

## 2024-07-22 ENCOUNTER — OFFICE VISIT (OUTPATIENT)
Dept: ENDOCRINOLOGY CLINIC | Facility: CLINIC | Age: 59
End: 2024-07-22
Payer: COMMERCIAL

## 2024-07-22 VITALS
HEART RATE: 94 BPM | BODY MASS INDEX: 37 KG/M2 | DIASTOLIC BLOOD PRESSURE: 84 MMHG | WEIGHT: 208 LBS | SYSTOLIC BLOOD PRESSURE: 121 MMHG

## 2024-07-22 DIAGNOSIS — E66.9 OBESITY (BMI 35.0-39.9 WITHOUT COMORBIDITY): Primary | ICD-10-CM

## 2024-07-22 PROCEDURE — 3079F DIAST BP 80-89 MM HG: CPT | Performed by: INTERNAL MEDICINE

## 2024-07-22 PROCEDURE — 99213 OFFICE O/P EST LOW 20 MIN: CPT | Performed by: INTERNAL MEDICINE

## 2024-07-22 PROCEDURE — 3074F SYST BP LT 130 MM HG: CPT | Performed by: INTERNAL MEDICINE

## 2024-07-22 RX ORDER — TIRZEPATIDE 10 MG/.5ML
10 INJECTION, SOLUTION SUBCUTANEOUS WEEKLY
Qty: 2 ML | Refills: 5 | Status: SHIPPED | OUTPATIENT
Start: 2024-07-22

## 2024-07-22 NOTE — TELEPHONE ENCOUNTER
Patient was wondering if 8/15/2024 Office Visit can be converted to Telemedicine?  Please call.  Thank you.

## 2024-07-22 NOTE — TELEPHONE ENCOUNTER
Spoke with patient. LOV 11/16/23. Last Prolia was 5/20/24. Cancelled appt in August and rescheduled for today. She has started Zepbound.

## 2024-07-22 NOTE — PROGRESS NOTES
Name: Kianna Yanez  Date: 7/22/2024      HISTORY OF PRESENT ILLNESS   Kianna Yanez is a 58 year old female who presents for   Chief Complaint   Patient presents with    Osteoporosis       #1 Osteoporosis   57 y/o F presents for follow up evaluation of osteoporosis.  She underwent screening DEXA which demonstrated significant bone loss in her spine.  She is currently postmenopausal and she has not had regular cycles in the past 5 years.  She does have history of stress fracture in the ankle during exercise. She does have history of nephrolithiasis in 2001.  No known family h/o nephrolithiasis.  She is currently maintained on Vitamin D 1000 units daily and her level in 8/2014 was 44.    She is having greek yogurt daily and occ almond milk.  Approx 1500mg of calcium in the diet.  But previously low calcium intake as a younger adult.     She has been maintained on Prolia and she is tolerating well.  Although she is having some myalgias after injection. She is maintained on Vitamin D supplementation but no calcium, dietary calcium.  No fractures.  She is currently feeling well.      She is receiving prolia injection #10 given in December 2019.  She was then transitioned off prolia with Reclast infusion in 8/2020.  However she developed severe side effects to Reclast including fever and severe joint pain/myalgias.  She is now feeling improved.  No falls or fractures since last visit.  Of note she does have history of significant Vitamin D deficiency maintained on 1 tablet every 5 days.  Her DEXA does demonstrate a decline in BMD over the past 2 years despite persistent therapy.     She has been restarted on Prolia 4/30/2021 and overall tolerated well.  No falls or fractures.  She did have severe reaction to reclast with persistent myalgias for approximately 11 months.  Labs are improved since restarting prolia therapy.  She has now received 7 injections and tolerating well.          Strong family h/o  osteoporosis, Aunt compression fractures    #2 Obesity    She has been started on Zepbound therapy in 2/2024 and tolerating well.  She did have some mild diarrhea but overall tolerating well.  She is now maintained on Zepbound 10mg subcutaneous weekly.  She has been able to lose approximately 60lbs.     In addition she is proceeding with intermittent fasting.     REVIEW OF SYSTEMS  Eyes: no change in vision  Neurologic: no headache, generalized or focal weakness or numbness.  Head: normal  ENT: normal  Lungs: no shortness of breath, wheezing or BADILLO  Cardiovascular:  no chest pain or palpitations  Gastrointestinal:  no abdominal pain, bowel movement problems  Musculoskeletal: no muscle pain or arthralgia  /Gyne: no frequency or discomfort while urinating  Psychiatric:  no acute distress, anxiety  or depression  Skin: normal moisturized skin    Medications:     Current Outpatient Medications:     metoprolol succinate ER 25 MG Oral Tablet 24 Hr, Take 1 tablet (25 mg total) by mouth daily., Disp: , Rfl:     potassium chloride 10 MEQ Oral Tab CR, Take 1 tablet (10 mEq total) by mouth 2 (two) times daily., Disp: 180 tablet, Rfl: 0    hydroCHLOROthiazide 25 MG Oral Tab, Take 0.5 tablets (12.5 mg total) by mouth daily., Disp: , Rfl:     olmesartan 20 MG Oral Tab, Take 1 tablet (20 mg total) by mouth daily., Disp: 90 tablet, Rfl: 0    ergocalciferol 1.25 MG (44074 UT) Oral Cap, Take 1 capsule (50,000 Units total) by mouth once a week., Disp: , Rfl:     rosuvastatin 5 MG Oral Tab, Take 2 tablets (10 mg total) by mouth nightly., Disp: 90 tablet, Rfl: 1    Tirzepatide-Weight Management (ZEPBOUND) 10 MG/0.5ML Subcutaneous Solution Auto-injector, Inject 10 mg into the skin once a week., Disp: 2 mL, Rfl: 5    SUMAtriptan (IMITREX) 50 MG Oral Tab, Use at onset; repeat once after 2 HRS-ONLY 2 IN 24 HR MAX, Disp: 9 tablet, Rfl: 1    pantoprazole 40 MG Oral Tab EC, TAKE 1 TABLET BY MOUTH EVERY MORNING BEFORE BREAKFAST, Disp: 90  tablet, Rfl: 3    PROLIA 60 MG/ML Subcutaneous Solution Prefilled Syringe, INJECT 1 ML (60 MG TOTAL) UNDER THE SKIN ONE TIME FOR 1 DOSE, Disp: 1 mL, Rfl: 0    Multiple Vitamins-Minerals (MULTI-VITAMIN/MINERALS) Oral Tab, Take 1 tablet by mouth daily., Disp: , Rfl:     Misc Natural Products (GLUCOSAMINE CHOND MSM FORMULA OR), Take by mouth., Disp: , Rfl:      Allergies:   No Known Allergies    Social History:   Social History     Socioeconomic History    Marital status:    Tobacco Use    Smoking status: Never     Passive exposure: Never    Smokeless tobacco: Never   Vaping Use    Vaping status: Never Used   Substance and Sexual Activity    Alcohol use: Yes     Alcohol/week: 2.0 standard drinks of alcohol     Types: 2 Glasses of wine per week     Comment: approx 2 drinks per month    Drug use: No    Sexual activity: Not Currently   Other Topics Concern    Caffeine Concern No    Stress Concern Yes    Weight Concern Yes    Special Diet No    Exercise Yes    Seat Belt Yes    Reaction to local anesthetic No       Medical History:   Past Medical History:    Achilles bursitis or tendinitis    Acne    Actinic keratosis    Allergic rhinitis    Anemia    Anxiety    Arthritis    osteoarthritis in knees    Back pain    Calcaneal spur    Calculus of kidney    Colitis    2012    Diverticulosis of large intestine    diverticulitis 2012 per pt    Esophageal reflux    Hallux rigidus    Herpes zoster    Hiatal hernia    High cholesterol    History of stomach ulcers    in college    Lumbar radiculopathy    Migraine without aura and without status migrainosus, not intractable    Noninfectious gastroenteritis and colitis    Nonrheumatic mitral valve regurgitation    mild    Nonspecific elevation of level of transaminase or lactic acid dehydrogenase (LDH)    Obesity, unspecified    Osteoarthritis    Osteoporosis    Other and unspecified hyperlipidemia    Palpitations    Unspecified essential hypertension    Visual impairment     glasses       Surgical history:   Past Surgical History:   Procedure Laterality Date    Cholecystectomy      Colonoscopy N/A 2021    Procedure: COLONOSCOPY/ESOPHAGOGASTRODUODENOSCOPY (EGD);  Surgeon: Bowen Sanchez MD;  [facility] END[facility]COPY    Egd      Fusion foot bone,midtarsal,1 jt Left 2020    Leep  2004      1991    And 1992    Other Bilateral 2020    Joint Fusion 1st Metataral bilateral    Other surgical history  Big toe joint fusion,     Skin surgery  2019    Biopsy-benign    Tonsillectomy  1972       PHYSICAL EXAM  /84   Pulse 94   Wt 208 lb (94.3 kg)   LMP 2014 (Approximate)   BMI 37.44 kg/m²     General Appearance:  alert, well developed, in no acute distress  Eyes:  normal conjunctivae, sclera., normal sclera and normal pupils  Throat/Neck: normal sound to voice.   Back: no kyphosis  Respiratory:  non-labored. no increased work of breathing.    Lymph Nodes:  No abnormal nodes noted  Skin:  normal moisture and skin texture  Hematologic:  no excessive bruising  Psychiatric:  oriented to time, self, and place    ASSESSMENT/PLAN:    1. Osteoporosis  -Diagnosed with significant osteoporosis  -She has finished 5 years of prolia and DEXA significantly improved  -She was transitioned off prolia with a dose of Reclast in 2020  -However she developed significant side effects with medication and DEXA BMD has declined  -labs are at goal   -Given rebound bone loss after prolia, restarted therapy - last injection 2024   -DEXA 2023 was improved   -Continue Vitamin D     2. Secondary Hyperparathyroidism  -Due to Vitamin D deficiency  -Trended her PTH levels over the past 4 years and direct correlation between PTH and Vitamin D  -Continue Ergocalciferol to weekly   -Normal calcium level    3. Obesity  - Discussed weight loss with patient  - Now maintained on Zepbound 10mg subcutaneous weekly  - She is tolerating well and able to lose 60lbs.   - Congratulated  patient on weight loss and also intermittent fasting     RTC 6 months     7/22/2024  Charity King MD

## 2024-08-27 ENCOUNTER — PATIENT MESSAGE (OUTPATIENT)
Dept: ENDOCRINOLOGY CLINIC | Facility: CLINIC | Age: 59
End: 2024-08-27

## 2024-08-28 RX ORDER — TIRZEPATIDE 12.5 MG/.5ML
12.5 INJECTION, SOLUTION SUBCUTANEOUS WEEKLY
Qty: 2 ML | Refills: 2 | Status: SHIPPED | OUTPATIENT
Start: 2024-08-28

## 2024-08-28 NOTE — TELEPHONE ENCOUNTER
From: Kianna Yanez  To: Charity King  Sent: 8/27/2024 4:31 PM CDT  Subject: Zepbound 12.5    I would like to titrate up to 12.5 mg after I complete my supply of 10 that I have at home. Would you please submit that to [facility] in [place]?    I need my diagnosis and starting BMI included for the savings card to be applied correctly.     Thank you!  Kianna

## 2024-08-28 NOTE — TELEPHONE ENCOUNTER
Dr. King, patient requesting to increase Zepbound to 12.5mg weekly. Pended.     LOV 7/22/24  FU 11/21/24

## 2024-09-21 DIAGNOSIS — E87.6 HYPOKALEMIA: ICD-10-CM

## 2024-09-24 ENCOUNTER — PATIENT MESSAGE (OUTPATIENT)
Dept: ENDOCRINOLOGY CLINIC | Facility: CLINIC | Age: 59
End: 2024-09-24

## 2024-09-24 DIAGNOSIS — E55.9 VITAMIN D DEFICIENCY: Primary | ICD-10-CM

## 2024-09-24 RX ORDER — ERGOCALCIFEROL 1.25 MG/1
50000 CAPSULE, LIQUID FILLED ORAL WEEKLY
Refills: 0 | OUTPATIENT
Start: 2024-09-24

## 2024-09-24 RX ORDER — POTASSIUM CHLORIDE 750 MG/1
10 TABLET, EXTENDED RELEASE ORAL 2 TIMES DAILY
Qty: 180 TABLET | Refills: 0 | Status: SHIPPED | OUTPATIENT
Start: 2024-09-24

## 2024-09-24 NOTE — TELEPHONE ENCOUNTER
Last OV relevant to medication: 5/3/24  Last refill date: 6/3/24 #90/refills: 0  When pt was asked to return for OV: 11/3/24  Upcoming appt/reason:   Future Appointments   Date Time Provider Department Center   11/8/2024  1:00 PM Lili Odell MD [facility] [address] [place]   11/21/2024  6:15 PM Charity King MD [facility]   12/6/2024  1:00 PM Bowen Sanchez MD [facility]   2/14/2025  1:00 PM Bowen Sanchez MD [facility]   Was pt informed of any over due labs: utd  Lab Results   Component Value Date    GLU 89 06/01/2024    BUN 21 06/01/2024    BUNCREA SEE NOTE: 06/01/2024    CREATSERUM 0.86 06/01/2024    ANIONGAP 11 12/22/2017    GFRNAA 98 04/15/2022    GFRAA 113 04/15/2022    CA 10.2 06/01/2024    OSMOCALC 281 12/22/2017    ALKPHO 81 04/20/2024    AST 15 04/20/2024    ALT 12 04/20/2024    ALKPHOS 10.9 10/14/2023    BILT 0.4 04/20/2024    TP 6.9 04/20/2024    ALB 4.3 04/20/2024    GLOBULIN 2.6 04/20/2024    AGRATIO 1.7 04/20/2024     06/01/2024    K 4.0 07/05/2024    CL 95 (L) 06/01/2024    CO2 28 06/01/2024       Is vitamin D per endo? Please advise thanks!

## 2024-09-24 NOTE — TELEPHONE ENCOUNTER
Refill done for K supplement.   Vit d not ordered by me. Likely endo. Should go to ordering provider.

## 2024-09-25 NOTE — TELEPHONE ENCOUNTER
From: Kianna Yanez  To: Charity King  Sent: 9/24/2024 4:20 PM CDT  Subject: Vitamin D Refill    Hello,    I requested a refill of my Vitamin D through the medications and it was incorrectly routed to my PCP and not you. Would you please send a refill to [facility] in [place]?    Thanks,  Kianna

## 2024-09-25 NOTE — TELEPHONE ENCOUNTER
Per LOV:  -She is currently maintained on Vitamin D 1000 units daily   -Of note she does have history of significant Vitamin D deficiency maintained on 1 tablet every 5 days     Plan:  -Continue Vitamin D      2. Secondary Hyperparathyroidism  -Due to Vitamin D deficiency  -Trended her PTH levels over the past 4 years and direct correlation between PTH and Vitamin D  -Continue Ergocalciferol to weekly       [facility] sent asking pt to confirm vitamin D dose and frequency.

## 2024-09-26 RX ORDER — ERGOCALCIFEROL 1.25 MG/1
50000 CAPSULE ORAL WEEKLY
Qty: 12 CAPSULE | Refills: 0 | Status: SHIPPED | OUTPATIENT
Start: 2024-09-26

## 2024-09-27 ENCOUNTER — PATIENT MESSAGE (OUTPATIENT)
Dept: INTERNAL MEDICINE CLINIC | Facility: CLINIC | Age: 59
End: 2024-09-27

## 2024-09-27 ENCOUNTER — PATIENT MESSAGE (OUTPATIENT)
Dept: ENDOCRINOLOGY CLINIC | Facility: CLINIC | Age: 59
End: 2024-09-27

## 2024-09-27 DIAGNOSIS — Z00.00 PHYSICAL EXAM, ANNUAL: Primary | ICD-10-CM

## 2024-09-27 DIAGNOSIS — Z13.29 SCREENING FOR ENDOCRINE, METABOLIC AND IMMUNITY DISORDER: ICD-10-CM

## 2024-09-27 DIAGNOSIS — Z13.228 SCREENING FOR ENDOCRINE, METABOLIC AND IMMUNITY DISORDER: ICD-10-CM

## 2024-09-27 DIAGNOSIS — E55.9 VITAMIN D DEFICIENCY: ICD-10-CM

## 2024-09-27 DIAGNOSIS — Z13.0 SCREENING FOR ENDOCRINE, METABOLIC AND IMMUNITY DISORDER: ICD-10-CM

## 2024-09-27 DIAGNOSIS — M81.0 AGE-RELATED OSTEOPOROSIS WITHOUT CURRENT PATHOLOGICAL FRACTURE: Primary | ICD-10-CM

## 2024-09-27 NOTE — TELEPHONE ENCOUNTER
From: Kianna Yanez  To: Charity King  Sent: 9/27/2024 9:52 AM CDT  Subject: Annual bloodwork    I have an appointment on 11/21. Please send any bloodwork request to [facility]. I am coordinating my lab work with my annual physical. I plan to get it done on 11/2.     Thank you,  Kianna

## 2024-09-27 NOTE — TELEPHONE ENCOUNTER
From: Kianna Yanez  To: Lili Odell  Sent: 9/27/2024 9:49 AM CDT  Subject: Annual bloodwork     I have my annual physical scheduled for 11/8. I will need my lab order sent to [facility].     Thank you,  Kianna

## 2024-09-30 NOTE — TELEPHONE ENCOUNTER
Lab orders dtd 9/27/24 mailed to patient and also sent as [facility] attachment - patient advised to contact clinic with questions.

## 2024-11-03 LAB
ABSOLUTE BASOPHILS: 30 CELLS/UL (ref 0–200)
ABSOLUTE EOSINOPHILS: 78 CELLS/UL (ref 15–500)
ABSOLUTE LYMPHOCYTES: 1716 CELLS/UL (ref 850–3900)
ABSOLUTE MONOCYTES: 408 CELLS/UL (ref 200–950)
ABSOLUTE NEUTROPHILS: 3768 CELLS/UL (ref 1500–7800)
ALBUMIN/GLOBULIN RATIO: 1.8 (CALC) (ref 1–2.5)
ALBUMIN: 4.2 G/DL (ref 3.6–5.1)
ALKALINE PHOSPHATASE: 91 U/L (ref 37–153)
ALT: 13 U/L (ref 6–29)
AST: 15 U/L (ref 10–35)
BASOPHILS: 0.5 %
BILIRUBIN, TOTAL: 0.7 MG/DL (ref 0.2–1.2)
BUN: 11 MG/DL (ref 7–25)
CALCIUM: 9.1 MG/DL (ref 8.6–10.4)
CARBON DIOXIDE: 25 MMOL/L (ref 20–32)
CHLORIDE: 101 MMOL/L (ref 98–110)
CHOL/HDLC RATIO: 2.8 (CALC)
CHOLESTEROL, TOTAL: 168 MG/DL
CREATININE: 0.68 MG/DL (ref 0.5–1.03)
EGFR: 102 ML/MIN/1.73M2
EOSINOPHILS: 1.3 %
GLOBULIN: 2.4 G/DL (CALC) (ref 1.9–3.7)
GLUCOSE: 88 MG/DL (ref 65–99)
HDL CHOLESTEROL: 60 MG/DL
HEMATOCRIT: 39.1 % (ref 35–45)
HEMOGLOBIN A1C: 5.1 % OF TOTAL HGB
HEMOGLOBIN: 12.8 G/DL (ref 11.7–15.5)
LDL-CHOLESTEROL: 86 MG/DL (CALC)
LYMPHOCYTES: 28.6 %
MCH: 30 PG (ref 27–33)
MCHC: 32.7 G/DL (ref 32–36)
MCV: 91.6 FL (ref 80–100)
MONOCYTES: 6.8 %
MPV: 10.6 FL (ref 7.5–12.5)
NEUTROPHILS: 62.8 %
NON-HDL CHOLESTEROL: 108 MG/DL (CALC)
PLATELET COUNT: 331 THOUSAND/UL (ref 140–400)
POTASSIUM: 3.5 MMOL/L (ref 3.5–5.3)
PROTEIN, TOTAL: 6.6 G/DL (ref 6.1–8.1)
RDW: 12.6 % (ref 11–15)
RED BLOOD CELL COUNT: 4.27 MILLION/UL (ref 3.8–5.1)
SODIUM: 137 MMOL/L (ref 135–146)
TRIGLYCERIDES: 120 MG/DL
TSH W/REFLEX TO FT4: 3.55 MIU/L (ref 0.4–4.5)
WHITE BLOOD CELL COUNT: 6 THOUSAND/UL (ref 3.8–10.8)

## 2024-11-08 ENCOUNTER — PATIENT MESSAGE (OUTPATIENT)
Dept: ENDOCRINOLOGY CLINIC | Facility: CLINIC | Age: 59
End: 2024-11-08

## 2024-11-08 ENCOUNTER — OFFICE VISIT (OUTPATIENT)
Dept: INTERNAL MEDICINE CLINIC | Facility: CLINIC | Age: 59
End: 2024-11-08
Payer: COMMERCIAL

## 2024-11-08 ENCOUNTER — TELEPHONE (OUTPATIENT)
Dept: INTERNAL MEDICINE CLINIC | Facility: CLINIC | Age: 59
End: 2024-11-08

## 2024-11-08 VITALS
SYSTOLIC BLOOD PRESSURE: 120 MMHG | HEART RATE: 84 BPM | HEIGHT: 62.5 IN | TEMPERATURE: 97 F | DIASTOLIC BLOOD PRESSURE: 82 MMHG | BODY MASS INDEX: 33.07 KG/M2 | WEIGHT: 184.31 LBS

## 2024-11-08 DIAGNOSIS — Z00.00 PHYSICAL EXAM, ANNUAL: ICD-10-CM

## 2024-11-08 DIAGNOSIS — Z23 NEED FOR VACCINATION: ICD-10-CM

## 2024-11-08 DIAGNOSIS — K21.9 GASTROESOPHAGEAL REFLUX DISEASE WITHOUT ESOPHAGITIS: ICD-10-CM

## 2024-11-08 DIAGNOSIS — I10 ESSENTIAL HYPERTENSION: ICD-10-CM

## 2024-11-08 DIAGNOSIS — E78.5 DYSLIPIDEMIA, GOAL LDL BELOW 100: ICD-10-CM

## 2024-11-08 DIAGNOSIS — E87.6 HYPOKALEMIA: ICD-10-CM

## 2024-11-08 DIAGNOSIS — Z12.31 ENCOUNTER FOR SCREENING MAMMOGRAM FOR MALIGNANT NEOPLASM OF BREAST: ICD-10-CM

## 2024-11-08 DIAGNOSIS — E66.811 CLASS 1 OBESITY DUE TO EXCESS CALORIES WITH SERIOUS COMORBIDITY AND BODY MASS INDEX (BMI) OF 33.0 TO 33.9 IN ADULT: ICD-10-CM

## 2024-11-08 DIAGNOSIS — M81.0 OSTEOPOROSIS, UNSPECIFIED OSTEOPOROSIS TYPE, UNSPECIFIED PATHOLOGICAL FRACTURE PRESENCE: Primary | ICD-10-CM

## 2024-11-08 DIAGNOSIS — E66.09 CLASS 1 OBESITY DUE TO EXCESS CALORIES WITH SERIOUS COMORBIDITY AND BODY MASS INDEX (BMI) OF 33.0 TO 33.9 IN ADULT: ICD-10-CM

## 2024-11-08 RX ORDER — METOPROLOL SUCCINATE 50 MG/1
50 TABLET, EXTENDED RELEASE ORAL DAILY
Qty: 90 TABLET | Refills: 1 | Status: SHIPPED | OUTPATIENT
Start: 2024-11-08

## 2024-11-08 RX ORDER — HYDROCHLOROTHIAZIDE 25 MG/1
25 TABLET ORAL DAILY
Qty: 90 TABLET | Refills: 1 | Status: SHIPPED | OUTPATIENT
Start: 2024-11-08

## 2024-11-08 RX ORDER — OLMESARTAN MEDOXOMIL 20 MG/1
20 TABLET ORAL DAILY
Qty: 90 TABLET | Refills: 1 | Status: SHIPPED | OUTPATIENT
Start: 2024-11-08

## 2024-11-08 RX ORDER — POTASSIUM CHLORIDE 1500 MG/1
20 TABLET, EXTENDED RELEASE ORAL 2 TIMES DAILY
Qty: 180 TABLET | Refills: 0 | Status: SHIPPED | OUTPATIENT
Start: 2024-11-08

## 2024-11-08 NOTE — TELEPHONE ENCOUNTER
Spoke to [facility] pharmacy, they advised that Olmesartan is new rx and possible risk of hyperkalemia with potassium supplement. Spoke to , she advised she is aware and okay to dispense, monitoring labs.

## 2024-11-08 NOTE — PROGRESS NOTES
[facility]    CHIEF COMPLAINT:   Chief Complaint   Patient presents with    Routine Physical     10/7/22-pap. 11/24/23-mammo. 5/4/21-colon-repeat 5 years         HPI:   Kianna Yanez is a 58 year old female who presents for a complete physical exam. Symptoms: denies discharge, itching, burning or dysuria.     Pap done 10/2022 negative with negative hpv. Recommended every 3 years due to history of abnormal paps in the past.     Mammo done 11/2023. Due this month.   Colonoscopy done 5/2021 repeat in 5 years. 1 adenoma.     Dexa done by endocrine. Has osteoporosis. On prolia. Done 11/2022.     Up to date tdap, shingrix, covid booster.   Get flu shot at her local pharmacy.     Exercise: walking 2-3 times a week, weight.     Derm: sees derm, she will make an appt.     On meds for htn, hyperlipidemia, gerd, osteoporosis, migraines.   On zepbound for weight loss per [facility].   She has made med changes.     Htn: Taking meds regularly. No chest pain, no shortness of breath. Bp was high at home so she increased hydrochlorothiazide from 12.5mg to 25mg daily. Still on metoprolol and olmesartan.     Hyperlipidemia: on statin. No muscle aches.  Now on crestor 5mg daily.     Gerd: now taking ppi prn. Has not needed to take it every day.     Other meds are the same.     She had low K on labs. Taking k dur 10meq bid.     Wt Readings from Last 6 Encounters:   11/08/24 184 lb 4.8 oz (83.6 kg)   07/22/24 208 lb (94.3 kg)   05/03/24 228 lb 12.8 oz (103.8 kg)   10/27/23 259 lb 12.8 oz (117.8 kg)   04/28/23 262 lb (118.8 kg)   09/23/22 263 lb 3.2 oz (119.4 kg)     Body mass index is 33.17 kg/m².       Current Outpatient Medications   Medication Sig Dispense Refill    metoprolol succinate ER 50 MG Oral Tablet 24 Hr Take 1 tablet (50 mg total) by mouth daily. 90 tablet 1    hydroCHLOROthiazide 25 MG Oral Tab Take 1 tablet (25 mg total) by mouth daily. Taking 1 tablet daily 90 tablet 1    olmesartan 20 MG Oral Tab Take 1 tablet  (20 mg total) by mouth daily. 90 tablet 1    potassium chloride 20 MEQ Oral Tab CR Take 1 tablet (20 mEq total) by mouth 2 (two) times daily. 180 tablet 0    Tirzepatide-Weight Management (ZEPBOUND) 10 MG/0.5ML Subcutaneous Solution Auto-injector Inject 10 mg into the skin once a week. 2 mL 5    ergocalciferol 1.25 MG (93184 UT) Oral Cap Take 1 capsule (50,000 Units total) by mouth once a week.      rosuvastatin 5 MG Oral Tab Take 2 tablets (10 mg total) by mouth nightly. (Patient taking differently: Take 1 tablet (5 mg total) by mouth nightly.) 90 tablet 1    SUMAtriptan (IMITREX) 50 MG Oral Tab Use at onset; repeat once after 2 HRS-ONLY 2 IN 24 HR MAX 9 tablet 1    pantoprazole 40 MG Oral Tab EC TAKE 1 TABLET BY MOUTH EVERY MORNING BEFORE BREAKFAST (Patient taking differently: TAKE 1 TABLET BY MOUTH EVERY MORNING BEFORE BREAKFASTAS NEEDED) 90 tablet 3    PROLIA 60 MG/ML Subcutaneous Solution Prefilled Syringe INJECT 1 ML (60 MG TOTAL) UNDER THE SKIN ONE TIME FOR 1 DOSE 1 mL 0    Multiple Vitamins-Minerals (MULTI-VITAMIN/MINERALS) Oral Tab Take 1 tablet by mouth daily.      Misc Natural Products (GLUCOSAMINE CHOND MSM FORMULA OR) Take by mouth.      Tirzepatide-Weight Management (ZEPBOUND) 12.5 MG/0.5ML Subcutaneous Solution Auto-injector Inject 12.5 mg into the skin once a week. (Patient not taking: Reported on 11/8/2024) 2 mL 2      Past Medical History:    Achilles bursitis or tendinitis    Acne    Actinic keratosis    Allergic rhinitis    Anemia    Anxiety    Arthritis    osteoarthritis in knees    Back pain    Calcaneal spur    Calculus of kidney    Colitis    2012    Diverticulosis of large intestine    diverticulitis 2012 per pt    Esophageal reflux    Hallux rigidus    Herpes zoster    Hiatal hernia    High cholesterol    History of stomach ulcers    in [facility]    Lumbar radiculopathy    Migraine without aura and without status migrainosus, not intractable    Noninfectious gastroenteritis and colitis     Nonrheumatic mitral valve regurgitation    mild    Nonspecific elevation of level of transaminase or lactic acid dehydrogenase (LDH)    Obesity, unspecified    Osteoarthritis    Osteoporosis    Other and unspecified hyperlipidemia    Palpitations    Unspecified essential hypertension    Visual impairment    glasses      Past Surgical History:   Procedure Laterality Date    Cholecystectomy      Colonoscopy N/A 2021    Procedure: COLONOSCOPY/ESOPHAGOGASTRODUODENOSCOPY (EGD);  Surgeon: Bowen Sanchez MD;  Location: [facility] ENDOSCOPY    Egd      Fusion foot bone,midtarsal,1 jt Left 2020    Leep  2004          And 1992    Other Bilateral 2020    Joint Fusion 1st Metataral bilateral    Other surgical history  Big toe joint fusion,     Skin surgery  2019    Biopsy-benign    Tonsillectomy  1972      Family History   Problem Relation Age of Onset    Heart Attack Father 60        myocardial infarction    Diabetes Father     Hypertension Father     Prostate Cancer Father 73    Anemia Father     Heart Disorder Father     Lipids Father     Obesity Father     Pulmonary Disease Father         COPD    Other (Bladder Cancer) Father     Other (pacemaker) Mother         brachycardia    Hypertension Mother     Bleeding Disorders Mother         Blood clots    Lipids Mother     Obesity Mother     Anemia Mother     Cancer Maternal Grandmother         Uterine Cancer    Diabetes Maternal Grandfather     Obesity Maternal Grandfather     Bleeding Disorders Paternal Grandmother         Blood clots    Heart Disorder Paternal Grandfather     No Known Problems Sister     No Known Problems Brother     Lipids Other         family h/o hyperlipidemia and vascular disease    Breast Cancer Neg     Ovarian Cancer Neg       Social History:   Social History     Socioeconomic History    Marital status:    Tobacco Use    Smoking status: Never     Passive exposure: Never    Smokeless tobacco: Never   Vaping Use     Vaping status: Never Used   Substance and Sexual Activity    Alcohol use: Yes     Alcohol/week: 2.0 standard drinks of alcohol     Types: 2 Glasses of wine per week     Comment: approx 2 drinks per month    Drug use: No    Sexual activity: Not Currently   Other Topics Concern    Caffeine Concern No    Stress Concern Yes    Weight Concern Yes    Special Diet No    Exercise Yes    Seat Belt Yes    Reaction to local anesthetic No   : .    Exercise: see hpi.  Diet: trying to watch calories     REVIEW OF SYSTEMS:   GENERAL: feels well otherwise  SKIN: denies any unusual skin lesions  EYES:denies blurred vision or double vision  HEENT: denies nasal congestion, sinus pain or ST  LUNGS: denies shortness of breath with exertion  CARDIOVASCULAR: denies chest pain on exertion  GI: denies abdominal pain,denies heartburn  : denies dysuria, vaginal discharge or itching  MUSCULOSKELETAL: denies back pain  NEURO: denies headaches  PSYCHE: denies depression or anxiety  HEMATOLOGIC: denies hx of anemia  ENDOCRINE: denies thyroid history  ALL/ASTHMA: denies hx of allergy or asthma    EXAM:   /82 (BP Location: Left arm, Patient Position: Sitting, Cuff Size: adult)   Pulse 84   Temp 97.3 °F (36.3 °C) (Temporal)   Ht 5' 2.5\" (1.588 m)   Wt 184 lb 4.8 oz (83.6 kg)   LMP 04/01/2014 (Approximate)   Breastfeeding No   BMI 33.17 kg/m²   Body mass index is 33.17 kg/m².   GENERAL: well developed, well nourished,in no apparent distress  SKIN: no rashes,no suspicious lesions  HEENT: atraumatic, normocephalic,ears and throat are clear  EYES:PERRLA, conjunctiva are clear  NECK: supple,no adenopathy,no bruits  CHEST: no chest tenderness  LUNGS: clear to auscultation  CARDIO: nl s1 and s2, RRR without murmur  GI: good BS's,no masses, HSM or tenderness  BREAST: no dominant or suspicious mass, no nipple discharge  GENITAL/URINARY:  External Genitalia:  General appearance; normal, Hair distribution; normal, Lesions  absent, Urethral Meatus:  Size normal, Location normal, Lesions absent, Prolapse absent, Vagina:  General appearance normal, Lesions absent, Pelvic support normal, Cervix:  General appearance normal, Discharge absent, Tenderness absent, Enlargement absent, Uterus:  Masses absent, Adnexa:  Masses absent, Tenderness absent, Anus/Perineum:  Lesions absent and Masses absent  No pap today.   MUSCULOSKELETAL: back is not tender,FROM of the back  EXTREMITIES: no cyanosis, clubbing or edema  NEURO: Oriented times three,cranial nerves are intact,motor and sensory are grossly intact    Labs:   Lab Results   Component Value Date/Time    WBC 6.0 11/02/2024 11:00 AM    HGB 12.8 11/02/2024 11:00 AM     11/02/2024 11:00 AM      Lab Results   Component Value Date/Time    GLU 88 11/02/2024 11:00 AM     11/02/2024 11:00 AM    K 3.5 11/02/2024 11:00 AM     11/02/2024 11:00 AM    CO2 25 11/02/2024 11:00 AM    CREATSERUM 0.68 11/02/2024 11:00 AM    CA 9.1 11/02/2024 11:00 AM    ALB 4.2 11/02/2024 11:00 AM    TP 6.6 11/02/2024 11:00 AM    ALKPHO 91 11/02/2024 11:00 AM    AST 15 11/02/2024 11:00 AM    ALT 13 11/02/2024 11:00 AM    BILT 0.7 11/02/2024 11:00 AM    TSH 2.81 12/01/2018 12:07 PM    T4F 0.72 05/30/2017 07:57 AM        Lab Results   Component Value Date/Time    CHOLEST 168 11/02/2024 11:00 AM    HDL 60 11/02/2024 11:00 AM    TRIG 120 11/02/2024 11:00 AM    LDL 86 11/02/2024 11:00 AM    NONHDLC 108 11/02/2024 11:00 AM       Lab Results   Component Value Date/Time    A1C 5.1 11/02/2024 11:00 AM      Vitamin D:    Lab Results   Component Value Date    VITD 62 10/14/2023           ASSESSMENT AND PLAN:   Kianna Yanez is a 58 year old female who presents for a complete physical exam.   1. Physical exam, annual  Pelvic and breast exam done.   Mammo ordered.   Other HM discussed.   Immunizations discussed.   Continue regular exercise.     2. Need for vaccination  - INFLUENZA VACCINE, TRI, PRESERV FREE, 0.5  ML    3. Encounter for screening mammogram for malignant neoplasm of breast  - [facility] TOOTIE 2D+3D SCREENING BILAT (CPT=77067/04104); Future    4. Essential hypertension  Bp still a bit elevated. Mostly the diastolic is elevated at home. HR is in the 80s most times as well.   Continue hydrochlorothiazide 25mg daily.   Increase metoprolol succinate to 50mg daily.   Continue olmesartan 20mg daily.   - metoprolol succinate ER 50 MG Oral Tablet 24 Hr; Take 1 tablet (50 mg total) by mouth daily.  Dispense: 90 tablet; Refill: 1  - hydroCHLOROthiazide 25 MG Oral Tab; Take 1 tablet (25 mg total) by mouth daily. Taking 1 tablet daily  Dispense: 90 tablet; Refill: 1  - olmesartan 20 MG Oral Tab; Take 1 tablet (20 mg total) by mouth daily.  Dispense: 90 tablet; Refill: 1  - Basic Metabolic Panel (8) [E]    5. Hypokalemia  Likely due to increase in the hydrochlorothiazide dose.   Increase K supplement to 20meq bid. Recheck K in 2 weeks.   - potassium chloride 20 MEQ Oral Tab CR; Take 1 tablet (20 mEq total) by mouth 2 (two) times daily.  Dispense: 180 tablet; Refill: 0  - Basic Metabolic Panel (8) [E]    6. Dyslipidemia, goal LDL below 100  Continue statin.     7. Gastroesophageal reflux disease without esophagitis  Continue ppi.     8. Class 1 obesity due to excess calories with serious comorbidity and body mass index (BMI) of 33.0 to 33.9 in adult  follow up with [facility]. Has had great success with zepbound.         Return in about 3 months (around 2/8/2025) for bp check.      Lili Odell MD

## 2024-11-12 RX ORDER — TIRZEPATIDE 15 MG/.5ML
15 INJECTION, SOLUTION SUBCUTANEOUS WEEKLY
Qty: 2 ML | Refills: 0 | Status: CANCELLED
Start: 2024-11-12

## 2024-11-12 NOTE — TELEPHONE ENCOUNTER
Dr. King, Patient is requesting an increase to her Zepbound and order for her Dexa scan.    Both orders have been pended for you. Please review and send if appropriate. Her next OV is 11/21/24. Thank you

## 2024-11-13 RX ORDER — TIRZEPATIDE 15 MG/.5ML
15 INJECTION, SOLUTION SUBCUTANEOUS WEEKLY
Qty: 6 ML | Refills: 1 | Status: SHIPPED | OUTPATIENT
Start: 2024-11-13

## 2024-11-18 LAB
ALKALINE PHOSPHATASE, BONE SPECIFIC: 9.8 MCG/L (ref 5.6–29)
PARATHYROID HORMONE,$INTACT: 71 PG/ML (ref 16–77)
VITAMIN D, 25-OH, TOTAL: 110 NG/ML (ref 30–100)

## 2024-11-21 ENCOUNTER — TELEPHONE (OUTPATIENT)
Dept: ENDOCRINOLOGY CLINIC | Facility: CLINIC | Age: 59
End: 2024-11-21

## 2024-11-21 ENCOUNTER — OFFICE VISIT (OUTPATIENT)
Dept: ENDOCRINOLOGY CLINIC | Facility: CLINIC | Age: 59
End: 2024-11-21

## 2024-11-21 VITALS
BODY MASS INDEX: 32.44 KG/M2 | DIASTOLIC BLOOD PRESSURE: 58 MMHG | HEIGHT: 62.5 IN | HEART RATE: 98 BPM | SYSTOLIC BLOOD PRESSURE: 85 MMHG | WEIGHT: 180.81 LBS

## 2024-11-21 DIAGNOSIS — N25.81 SECONDARY HYPERPARATHYROIDISM (HCC): ICD-10-CM

## 2024-11-21 DIAGNOSIS — M81.0 AGE-RELATED OSTEOPOROSIS WITHOUT CURRENT PATHOLOGICAL FRACTURE: Primary | ICD-10-CM

## 2024-11-21 DIAGNOSIS — E66.9 OBESITY (BMI 35.0-39.9 WITHOUT COMORBIDITY): ICD-10-CM

## 2024-11-21 DIAGNOSIS — E55.9 VITAMIN D DEFICIENCY: ICD-10-CM

## 2024-11-21 PROCEDURE — 3008F BODY MASS INDEX DOCD: CPT | Performed by: INTERNAL MEDICINE

## 2024-11-21 PROCEDURE — 96372 THER/PROPH/DIAG INJ SC/IM: CPT | Performed by: INTERNAL MEDICINE

## 2024-11-21 PROCEDURE — 3078F DIAST BP <80 MM HG: CPT | Performed by: INTERNAL MEDICINE

## 2024-11-21 PROCEDURE — 99214 OFFICE O/P EST MOD 30 MIN: CPT | Performed by: INTERNAL MEDICINE

## 2024-11-21 PROCEDURE — 3074F SYST BP LT 130 MM HG: CPT | Performed by: INTERNAL MEDICINE

## 2024-11-21 NOTE — TELEPHONE ENCOUNTER
Pt coming in today for Prolia injection with provider. No IV was submitted. Submitted IV via [employer]. Awaiting determination.

## 2024-11-22 NOTE — PROGRESS NOTES
Name: Kianna Yanez  Date: 11/21/2024      HISTORY OF PRESENT ILLNESS   Kianna Yanez is a 58 year old female who presents for   Chief Complaint   Patient presents with    Osteoporosis     Pt is in for a Osteoporosis follow up. No recent falls/trips No recent fractures or        #1 Osteoporosis   57 y/o F presents for follow up evaluation of osteoporosis.  She underwent screening DEXA which demonstrated significant bone loss in her spine.  She is currently postmenopausal and she has not had regular cycles in the past 5 years.  She does have history of stress fracture in the ankle during exercise. She does have history of nephrolithiasis in 2001.  No known family h/o nephrolithiasis.  She is currently maintained on Vitamin D 1000 units daily and her level in 8/2014 was 44.    She is having greek yogurt daily and occ almond milk.  Approx 1500mg of calcium in the diet.  But previously low calcium intake as a younger adult.     She is received prolia injection #10 given in December 2019.  She was then transitioned off prolia with Reclast infusion in 8/2020.  However she developed severe side effects to Reclast including fever and severe joint pain/myalgias.  She is now feeling improved.  No falls or fractures since last visit.  Of note she does have history of significant Vitamin D deficiency maintained on 1 tablet every 5 days.  Her DEXA does demonstrate a decline in BMD over the past 2 years despite persistent therapy.     She has been restarted on Prolia 4/30/2021 and overall tolerated well.  No falls or fractures.  She did have severe reaction to reclast with persistent myalgias for approximately 11 months.  Labs are improved since restarting prolia therapy.  She has now received 7 and injection #8 today. She is  tolerating well.          Strong family h/o osteoporosis, Aunt compression fractures    #2 Obesity    She has been started on Zepbound therapy in 2/2024 and tolerating well.  She did have some  mild diarrhea but overall tolerating well.  She is now maintained on Zepbound 12.5mg subcutaneous weekly.  She has been able to lose approximately 80lbs.  She just started 12.5mg dose 2 weeks ago and will finish 3 months then move to 15mg dose.      In addition she is proceeding with intermittent fasting.     REVIEW OF SYSTEMS  Eyes: no change in vision  Neurologic: no headache, generalized or focal weakness or numbness.  Head: normal  ENT: normal  Lungs: no shortness of breath, wheezing or BADILLO  Cardiovascular:  no chest pain or palpitations  Gastrointestinal:  no abdominal pain, bowel movement problems  Musculoskeletal: no muscle pain or arthralgia  /Gyne: no frequency or discomfort while urinating  Psychiatric:  no acute distress, anxiety  or depression  Skin: normal moisturized skin    Medications:     Current Outpatient Medications:     Tirzepatide-Weight Management (ZEPBOUND) 15 MG/0.5ML Subcutaneous Solution Auto-injector, Inject 15 mg into the skin once a week., Disp: 6 mL, Rfl: 1    metoprolol succinate ER 50 MG Oral Tablet 24 Hr, Take 1 tablet (50 mg total) by mouth daily., Disp: 90 tablet, Rfl: 1    hydroCHLOROthiazide 25 MG Oral Tab, Take 1 tablet (25 mg total) by mouth daily. Taking 1 tablet daily, Disp: 90 tablet, Rfl: 1    olmesartan 20 MG Oral Tab, Take 1 tablet (20 mg total) by mouth daily., Disp: 90 tablet, Rfl: 1    potassium chloride 20 MEQ Oral Tab CR, Take 1 tablet (20 mEq total) by mouth 2 (two) times daily., Disp: 180 tablet, Rfl: 0    Tirzepatide-Weight Management (ZEPBOUND) 12.5 MG/0.5ML Subcutaneous Solution Auto-injector, Inject 12.5 mg into the skin once a week. (Patient not taking: Reported on 11/8/2024), Disp: 2 mL, Rfl: 2    Tirzepatide-Weight Management (ZEPBOUND) 10 MG/0.5ML Subcutaneous Solution Auto-injector, Inject 10 mg into the skin once a week., Disp: 2 mL, Rfl: 5    ergocalciferol 1.25 MG (81045 UT) Oral Cap, Take 1 capsule (50,000 Units total) by mouth once a week., Disp: ,  Rfl:     rosuvastatin 5 MG Oral Tab, Take 2 tablets (10 mg total) by mouth nightly. (Patient taking differently: Take 1 tablet (5 mg total) by mouth nightly.), Disp: 90 tablet, Rfl: 1    SUMAtriptan (IMITREX) 50 MG Oral Tab, Use at onset; repeat once after 2 HRS-ONLY 2 IN 24 HR MAX, Disp: 9 tablet, Rfl: 1    pantoprazole 40 MG Oral Tab EC, TAKE 1 TABLET BY MOUTH EVERY MORNING BEFORE BREAKFAST (Patient taking differently: TAKE 1 TABLET BY MOUTH EVERY MORNING BEFORE BREAKFASTAS NEEDED), Disp: 90 tablet, Rfl: 3    PROLIA 60 MG/ML Subcutaneous Solution Prefilled Syringe, INJECT 1 ML (60 MG TOTAL) UNDER THE SKIN ONE TIME FOR 1 DOSE, Disp: 1 mL, Rfl: 0    Multiple Vitamins-Minerals (MULTI-VITAMIN/MINERALS) Oral Tab, Take 1 tablet by mouth daily., Disp: , Rfl:     Misc Natural Products (GLUCOSAMINE CHOND MSM FORMULA OR), Take by mouth., Disp: , Rfl:      Allergies:   No Known Allergies    Social History:   Social History     Socioeconomic History    Marital status:    Tobacco Use    Smoking status: Never     Passive exposure: Never    Smokeless tobacco: Never   Vaping Use    Vaping status: Never Used   Substance and Sexual Activity    Alcohol use: Yes     Alcohol/week: 2.0 standard drinks of alcohol     Types: 2 Glasses of wine per week     Comment: approx 2 drinks per month    Drug use: No    Sexual activity: Not Currently   Other Topics Concern    Caffeine Concern No    Stress Concern Yes    Weight Concern Yes    Special Diet No    Exercise Yes    Seat Belt Yes    Reaction to local anesthetic No       Medical History:   Past Medical History:    Achilles bursitis or tendinitis    Acne    Actinic keratosis    Allergic rhinitis    Anemia    Anxiety    Arthritis    osteoarthritis in knees    Back pain    Calcaneal spur    Calculus of kidney    Colitis    2012    Diverticulosis of large intestine    diverticulitis 2012 per pt    Esophageal reflux    Hallux rigidus    Herpes zoster    Hiatal hernia    High cholesterol     History of stomach ulcers    in [facility]    Lumbar radiculopathy    Migraine without aura and without status migrainosus, not intractable    Noninfectious gastroenteritis and colitis    Nonrheumatic mitral valve regurgitation    mild    Nonspecific elevation of level of transaminase or lactic acid dehydrogenase (LDH)    Obesity, unspecified    Osteoarthritis    Osteoporosis    Other and unspecified hyperlipidemia    Palpitations    Unspecified essential hypertension    Visual impairment    glasses       Surgical history:   Past Surgical History:   Procedure Laterality Date    Cholecystectomy      Colonoscopy N/A 2021    Procedure: COLONOSCOPY/ESOPHAGOGASTRODUODENOSCOPY (EGD);  Surgeon: Bowen Sanchez MD;  Location: [facility] ENDOSCOPY    Egd      Fusion foot bone,midtarsal,1 jt Left 2020    Leep  2004      1991    And 1992    Other Bilateral 2020    Joint Fusion 1st Metataral bilateral    Other surgical history  Big toe joint fusion,     Skin surgery  2019    Biopsy-benign    Tonsillectomy  1972       PHYSICAL EXAM  BP (!) 85/58   Pulse 98   Ht 5' 2.5\" (1.588 m)   Wt 180 lb 12.8 oz (82 kg)   LMP 2014 (Approximate)   BMI 32.54 kg/m²     General Appearance:  alert, well developed, in no acute distress  Eyes:  normal conjunctivae, sclera., normal sclera and normal pupils  Throat/Neck: normal sound to voice.   Back: no kyphosis  Respiratory:  non-labored. no increased work of breathing.    Lymph Nodes:  No abnormal nodes noted  Skin:  normal moisture and skin texture  Hematologic:  no excessive bruising  Psychiatric:  oriented to time, self, and place    ASSESSMENT/PLAN:    1. Osteoporosis  -Diagnosed with significant osteoporosis  -She has finished 5 years of prolia and DEXA significantly improved  -She was transitioned off prolia with a dose of Reclast in 2020  -However she developed significant side effects with medication and DEXA BMD has declined  -labs are at goal    -Given rebound bone loss after prolia, restarted therapy, injection #8 today   -DEXA 1/2023 was improved   -Continue Vitamin D     2. Secondary Hyperparathyroidism  -Due to Vitamin D deficiency  -Trended her PTH levels over the past 4 years and direct correlation between PTH and Vitamin D  -Now significant Vitamin D elevation  -Hold supplement for one month   -Then restart at 2 times per month  -Recheck level in 3 months   -Normal calcium level    3. Obesity  - Discussed weight loss with patient  - Now maintained on Zepbound  - She is tolerating well and able to lose 60lbs.   - Congratulated patient on weight loss and also intermittent fasting     RTC 6 months     11/21/2024  Charity King MD

## 2024-11-23 LAB
BUN: 12 MG/DL (ref 7–25)
CALCIUM: 9.5 MG/DL (ref 8.6–10.4)
CARBON DIOXIDE: 25 MMOL/L (ref 20–32)
CHLORIDE: 97 MMOL/L (ref 98–110)
CREATININE: 0.76 MG/DL (ref 0.5–1.03)
EGFR: 91 ML/MIN/1.73M2
GLUCOSE: 79 MG/DL (ref 65–99)
POTASSIUM: 4 MMOL/L (ref 3.5–5.3)
SODIUM: 133 MMOL/L (ref 135–146)

## 2024-11-25 DIAGNOSIS — E78.5 DYSLIPIDEMIA, GOAL LDL BELOW 100: ICD-10-CM

## 2024-11-25 RX ORDER — ROSUVASTATIN CALCIUM 5 MG/1
5 TABLET, COATED ORAL NIGHTLY
Qty: 90 TABLET | Refills: 0 | Status: SHIPPED | OUTPATIENT
Start: 2024-11-25 | End: 2025-02-14

## 2024-11-25 NOTE — TELEPHONE ENCOUNTER
Spoke to patient regarding labs. Asking for refill of rosuvastatin. Previous script sent on 5/3/24, sig read-  Take 2 tablets (10 mg total) by mouth nightly. Patient endorses she only takes 1 tab 5 mg by mouth nightly of rosuvastatin.     Fyi-sent in refill with sig how patient currently taking. Thanks!        Cholesterol Medication Protocol Gntyix7411/25/2024 10:07 AM   Protocol Details ALT < 80    ALT resulted within past year    Lipid panel within past 12 months    In person appointment or virtual visit in the past 12 mos or appointment in next 3 mos      6. Dyslipidemia, goal LDL below 100  Continue statin.     Hyperlipidemia: on statin. No muscle aches.  Now on crestor 5mg daily.        Return in about 3 months (around 2/8/2025) for bp check.    Future Appointments   Date Time Provider Department Center   11/29/2024 11:00 AM BBK [facility] RM1 [facility]   11/29/2024  1:00 PM WDR DEXA RM1 WDR DEXA [facility]   12/23/2024  3:15 PM Roro Yang MD ECS[facility]M [facility] [place]   2/14/2025  1:00 PM Lili Odell MD [facility] [place]   3/11/2025  1:00 PM Bowen Sanchez MD ECADOGASTRO [facility]   5/12/2025  8:00 AM Bowen Sanchez MD ECC[facility]LEY [facility]   5/23/2025 10:15 AM Charity King MD ECWMOENDO [facility]

## 2024-11-26 ENCOUNTER — TELEPHONE (OUTPATIENT)
Dept: INTERNAL MEDICINE CLINIC | Facility: CLINIC | Age: 59
End: 2024-11-26

## 2024-11-26 NOTE — TELEPHONE ENCOUNTER
Incoming (mail or fax):  fax  Received from:  [facility] derm  Documentation given to:  results    Pathology report

## 2024-11-29 ENCOUNTER — HOSPITAL ENCOUNTER (OUTPATIENT)
Dept: BONE DENSITY | Age: 59
Discharge: HOME | End: 2024-11-29
Attending: NURSE PRACTITIONER
Payer: COMMERCIAL

## 2024-11-29 ENCOUNTER — HOSPITAL ENCOUNTER (OUTPATIENT)
Dept: MAMMOGRAPHY | Age: 59
Discharge: HOME | End: 2024-11-29
Attending: INTERNAL MEDICINE
Payer: COMMERCIAL

## 2024-11-29 DIAGNOSIS — Z12.31 ENCOUNTER FOR SCREENING MAMMOGRAM FOR MALIGNANT NEOPLASM OF BREAST: ICD-10-CM

## 2024-11-29 DIAGNOSIS — M81.0 OSTEOPOROSIS, UNSPECIFIED OSTEOPOROSIS TYPE, UNSPECIFIED PATHOLOGICAL FRACTURE PRESENCE: ICD-10-CM

## 2024-11-29 PROCEDURE — 77080 DXA BONE DENSITY AXIAL: CPT | Performed by: NURSE PRACTITIONER

## 2024-11-29 PROCEDURE — 77063 BREAST TOMOSYNTHESIS BI: CPT | Performed by: INTERNAL MEDICINE

## 2024-11-29 PROCEDURE — 77067 SCR MAMMO BI INCL CAD: CPT | Performed by: INTERNAL MEDICINE

## 2024-12-02 ENCOUNTER — MED REC SCAN ONLY (OUTPATIENT)
Dept: INTERNAL MEDICINE CLINIC | Facility: CLINIC | Age: 59
End: 2024-12-02

## 2024-12-04 ENCOUNTER — HOSPITAL ENCOUNTER (OUTPATIENT)
Dept: MAMMOGRAPHY | Facility: HOSPITAL | Age: 59
Discharge: HOME | End: 2024-12-04
Attending: INTERNAL MEDICINE
Payer: COMMERCIAL

## 2024-12-04 DIAGNOSIS — R92.2 INCONCLUSIVE MAMMOGRAM: ICD-10-CM

## 2024-12-04 PROCEDURE — 76642 ULTRASOUND BREAST LIMITED: CPT | Performed by: INTERNAL MEDICINE

## 2024-12-04 NOTE — TELEPHONE ENCOUNTER
Patient due for Prolia injection in June. Sent [employer] message to schedule patient for appt. IV submitted to Prolia plus and labs placed per protocol. Informed patient to complete at least one week before appt.  Will await [employer] response to get appt sched Please schedule the patient:     Procedure type: Mohs    Cancer type: squamous cell carcinoma in situ    Location: left anterior tibia    Closure: primary    Time slot: any    Please verify blood thinners: none     Nurse will call patient: no    Additional information:  N/A

## 2024-12-23 ENCOUNTER — OFFICE VISIT (OUTPATIENT)
Dept: DERMATOLOGY CLINIC | Facility: CLINIC | Age: 59
End: 2024-12-23
Payer: COMMERCIAL

## 2024-12-23 DIAGNOSIS — L57.0 AK (ACTINIC KERATOSIS): ICD-10-CM

## 2024-12-23 DIAGNOSIS — H01.9 DERMATITIS OF EYELID, UNSPECIFIED LATERALITY, UNSPECIFIED TYPE: ICD-10-CM

## 2024-12-23 DIAGNOSIS — L82.1 SK (SEBORRHEIC KERATOSIS): ICD-10-CM

## 2024-12-23 DIAGNOSIS — D22.9 MULTIPLE NEVI: ICD-10-CM

## 2024-12-23 DIAGNOSIS — L40.9 PSORIASIS AND SIMILAR DISORDER: ICD-10-CM

## 2024-12-23 DIAGNOSIS — D48.5 NEOPLASM OF UNCERTAIN BEHAVIOR OF SKIN: Primary | ICD-10-CM

## 2024-12-23 DIAGNOSIS — D23.9 BENIGN NEOPLASM OF SKIN, UNSPECIFIED LOCATION: ICD-10-CM

## 2024-12-23 DIAGNOSIS — L81.4 LENTIGO: ICD-10-CM

## 2024-12-23 PROCEDURE — 11102 TANGNTL BX SKIN SINGLE LES: CPT | Performed by: DERMATOLOGY

## 2024-12-23 PROCEDURE — 99213 OFFICE O/P EST LOW 20 MIN: CPT | Performed by: DERMATOLOGY

## 2024-12-23 PROCEDURE — 88305 TISSUE EXAM BY PATHOLOGIST: CPT | Performed by: DERMATOLOGY

## 2024-12-23 RX ORDER — PIMECROLIMUS 10 MG/G
1 CREAM TOPICAL 2 TIMES DAILY
Qty: 30 G | Refills: 2 | Status: SHIPPED | OUTPATIENT
Start: 2024-12-23

## 2024-12-28 LAB — VITAMIN D, 25-OH, TOTAL: 87 NG/ML (ref 30–100)

## 2024-12-28 NOTE — PROGRESS NOTES
The pathology report from last visit showed   right jawline, shave biopsy:  -Actinic keratosis  Please log in test results.  Please call patient and inform of results and recommendations.  (Added to history).  Pt to  rtc     or prn.

## 2024-12-30 ENCOUNTER — TELEPHONE (OUTPATIENT)
Dept: DERMATOLOGY CLINIC | Facility: CLINIC | Age: 59
End: 2024-12-30

## 2024-12-30 NOTE — TELEPHONE ENCOUNTER
Pt states pimecrolimus needs PA, please route back with chart notes if you wish to attempt      Medication PA Requested:  pimecrolimus 1% cream                                                        CoverMyMeds Used:  Key:  Quantity: 30gm  Day Supply: 30  Sig: use BID   DX Code:                                 CPT code (if applicable):   Case Number/Pending Ref#:

## 2024-12-31 NOTE — PROGRESS NOTES
Operative Report                     Shave/  Tangential biopsy     Clinical diagnosis:    Size of lesion:    Location:  Patient with sensitive, red ,atrophic nodule 4x6mm r/o BCC right jawline   Procedure:    With patient in appropriate position the skin of the above was scrubbed with alcohol.  Anesthesia was obtained with 1% Xylocaine with epinephrine.  The skin surrounding the lesion was placed under tension and the lesion was incised using a #15 scalpel blade.  The specimen was sent for histopathologic exam.    Hemostasis was obtained with electrocautery/aluminum chloride.  Estimated blood loss less than 2 cc.    Biopsy dressed with Polysporin, bandage.    Pressure dressing:   No    Complications: None    Written instructions given and reviewed with patient    Await pathology    Contact information reviewed.    Procedural physician:  Roro Yang MD

## 2024-12-31 NOTE — PROGRESS NOTES
Kianna Yanez is a 59 year old female.  HPI:     CC:    Chief Complaint   Patient presents with    Derm Problem     LOV 2021 pt present for a skin check with some areas of concern on her face. Pt was seen by another dermatologist on 2024 and had a biopsy done on the upper thigh. The result was benign. Pt has eczema, pt's father had BCC.         Allergies:  Patient has no known allergies.    HISTORY:    Past Medical History:    Achilles bursitis or tendinitis    Acne    Actinic keratosis    AK (actinic keratosis)    right jawline    Allergic rhinitis    Anemia    Anxiety    Arthritis    osteoarthritis in knees    Back pain    Calcaneal spur    Calculus of kidney    Colitis        Diverticulosis of large intestine    diverticulitis  per pt    Esophageal reflux    Hallux rigidus    Herpes zoster    Hiatal hernia    High cholesterol    History of stomach ulcers    in [facility]    Lumbar radiculopathy    Migraine without aura and without status migrainosus, not intractable    Noninfectious gastroenteritis and colitis    Nonrheumatic mitral valve regurgitation    mild    Nonspecific elevation of level of transaminase or lactic acid dehydrogenase (LDH)    Obesity, unspecified    Osteoarthritis    Osteoporosis    Other and unspecified hyperlipidemia    Palpitations    Unspecified essential hypertension    Visual impairment    glasses      Past Surgical History:   Procedure Laterality Date    Cholecystectomy      Colonoscopy N/A 2021    Procedure: COLONOSCOPY/ESOPHAGOGASTRODUODENOSCOPY (EGD);  Surgeon: Bowen Sanchez MD;  Location: [facility] ENDOSCOPY    Egd      Fusion foot bone,midtarsal,1 jt Left 2020    Leep  2004      1991    And 1992    Other Bilateral 2020    Joint Fusion 1st Metataral bilateral    Other surgical history  Big toe joint fusion,     Skin surgery  2019    Biopsy-benign    Tonsillectomy  1972      Family History   Problem Relation Age of Onset     Heart Attack Father 60        myocardial infarction    Diabetes Father     Hypertension Father     Prostate Cancer Father 73    Anemia Father     Heart Disorder Father     Lipids Father     Obesity Father     Pulmonary Disease Father         COPD    Other (Bladder Cancer) Father     Other (pacemaker) Mother         brachycardia    Hypertension Mother     Bleeding Disorders Mother         Blood clots    Lipids Mother     Obesity Mother     Anemia Mother     Cancer Maternal Grandmother         Uterine Cancer    Diabetes Maternal Grandfather     Obesity Maternal Grandfather     Bleeding Disorders Paternal Grandmother         Blood clots    Heart Disorder Paternal Grandfather     No Known Problems Sister     No Known Problems Brother     Lipids Other         family h/o hyperlipidemia and vascular disease    Breast Cancer Neg     Ovarian Cancer Neg       Social History     Socioeconomic History    Marital status:    Tobacco Use    Smoking status: Never     Passive exposure: Never    Smokeless tobacco: Never   Vaping Use    Vaping status: Never Used   Substance and Sexual Activity    Alcohol use: Yes     Alcohol/week: 2.0 standard drinks of alcohol     Types: 2 Glasses of wine per week     Comment: approx 2 drinks per month    Drug use: No    Sexual activity: Not Currently   Other Topics Concern    Caffeine Concern No    Stress Concern Yes    Weight Concern Yes    Special Diet No    Exercise Yes    Seat Belt Yes    Breast feeding No    Reaction to local anesthetic No    Pt has a pacemaker No    Pt has a defibrillator No        Current Outpatient Medications   Medication Sig Dispense Refill    pimecrolimus (ELIDEL) 1 % External Cream Apply 1 Application topically 2 (two) times daily. Use bid to eczema 30 g 2    olmesartan 20 MG Oral Tab Take 2 tablets (40 mg total) by mouth daily.      rosuvastatin 5 MG Oral Tab Take 1 tablet (5 mg total) by mouth nightly. 90 tablet 0    Tirzepatide-Weight Management (ZEPBOUND) 15  MG/0.5ML Subcutaneous Solution Auto-injector Inject 15 mg into the skin once a week. 6 mL 1    metoprolol succinate ER 50 MG Oral Tablet 24 Hr Take 1 tablet (50 mg total) by mouth daily. 90 tablet 1    potassium chloride 20 MEQ Oral Tab CR Take 1 tablet (20 mEq total) by mouth 2 (two) times daily. 180 tablet 0    Tirzepatide-Weight Management (ZEPBOUND) 10 MG/0.5ML Subcutaneous Solution Auto-injector Inject 10 mg into the skin once a week. 2 mL 5    ergocalciferol 1.25 MG (26526 UT) Oral Cap Take 1 capsule (50,000 Units total) by mouth once a week.      SUMAtriptan (IMITREX) 50 MG Oral Tab Use at onset; repeat once after 2 HRS-ONLY 2 IN 24 HR MAX 9 tablet 1    pantoprazole 40 MG Oral Tab EC TAKE 1 TABLET BY MOUTH EVERY MORNING BEFORE BREAKFAST (Patient taking differently: TAKE 1 TABLET BY MOUTH EVERY MORNING BEFORE BREAKFASTAS NEEDED) 90 tablet 3    PROLIA 60 MG/ML Subcutaneous Solution Prefilled Syringe INJECT 1 ML (60 MG TOTAL) UNDER THE SKIN ONE TIME FOR 1 DOSE 1 mL 0    Multiple Vitamins-Minerals (MULTI-VITAMIN/MINERALS) Oral Tab Take 1 tablet by mouth daily.      Misc Natural Products (GLUCOSAMINE CHOND MSM FORMULA OR) Take by mouth.      Tirzepatide-Weight Management (ZEPBOUND) 12.5 MG/0.5ML Subcutaneous Solution Auto-injector Inject 12.5 mg into the skin once a week. (Patient not taking: Reported on 12/23/2024) 2 mL 2     Allergies:   No Known Allergies    Past Medical History:    Achilles bursitis or tendinitis    Acne    Actinic keratosis    AK (actinic keratosis)    right jawline    Allergic rhinitis    Anemia    Anxiety    Arthritis    osteoarthritis in knees    Back pain    Calcaneal spur    Calculus of kidney    Colitis    2012    Diverticulosis of large intestine    diverticulitis 2012 per pt    Esophageal reflux    Hallux rigidus    Herpes zoster    Hiatal hernia    High cholesterol    History of stomach ulcers    in [facility]    Lumbar radiculopathy    Migraine without aura and without status  migrainosus, not intractable    Noninfectious gastroenteritis and colitis    Nonrheumatic mitral valve regurgitation    mild    Nonspecific elevation of level of transaminase or lactic acid dehydrogenase (LDH)    Obesity, unspecified    Osteoarthritis    Osteoporosis    Other and unspecified hyperlipidemia    Palpitations    Unspecified essential hypertension    Visual impairment    glasses     Past Surgical History:   Procedure Laterality Date    Cholecystectomy      Colonoscopy N/A 2021    Procedure: COLONOSCOPY/ESOPHAGOGASTRODUODENOSCOPY (EGD);  Surgeon: Bowen Sanchez MD;  Location: [facility] ENDOSCOPY    Egd      Fusion foot bone,midtarsal,1 jt Left 2020    Leep  2004          And     Other Bilateral 2020    Joint Fusion 1st Metataral bilateral    Other surgical history  Big toe joint fusion,     Skin surgery  2019    Biopsy-benign    Tonsillectomy  1972     Social History     Socioeconomic History    Marital status:      Spouse name: Not on file    Number of children: Not on file    Years of education: Not on file    Highest education level: Not on file   Occupational History    Not on file   Tobacco Use    Smoking status: Never     Passive exposure: Never    Smokeless tobacco: Never   Vaping Use    Vaping status: Never Used   Substance and Sexual Activity    Alcohol use: Yes     Alcohol/week: 2.0 standard drinks of alcohol     Types: 2 Glasses of wine per week     Comment: approx 2 drinks per month    Drug use: No    Sexual activity: Not Currently   Other Topics Concern     Service Not Asked    Blood Transfusions Not Asked    Caffeine Concern No    Occupational Exposure Not Asked    Hobby Hazards Not Asked    Sleep Concern Not Asked    Stress Concern Yes    Weight Concern Yes    Special Diet No    Back Care Not Asked    Exercise Yes    Bike Helmet Not Asked    Seat Belt Yes    Self-Exams Not Asked    Grew up on a farm Not Asked    History of tanning Not  Asked    Outdoor occupation Not Asked    Breast feeding No    Reaction to local anesthetic No    Pt has a pacemaker No    Pt has a defibrillator No   Social History Narrative    Not on file     Social Drivers of Health     Financial Resource Strain: Not on file   Food Insecurity: Not on file   Transportation Needs: Not on file   Physical Activity: Not on file   Stress: Not on file   Social Connections: Not on file   Housing Stability: Not on file     Family History   Problem Relation Age of Onset    Heart Attack Father 60        myocardial infarction    Diabetes Father     Hypertension Father     Prostate Cancer Father 73    Anemia Father     Heart Disorder Father     Lipids Father     Obesity Father     Pulmonary Disease Father         COPD    Other (Bladder Cancer) Father     Other (pacemaker) Mother         brachycardia    Hypertension Mother     Bleeding Disorders Mother         Blood clots    Lipids Mother     Obesity Mother     Anemia Mother     Cancer Maternal Grandmother         Uterine Cancer    Diabetes Maternal Grandfather     Obesity Maternal Grandfather     Bleeding Disorders Paternal Grandmother         Blood clots    Heart Disorder Paternal Grandfather     No Known Problems Sister     No Known Problems Brother     Lipids Other         family h/o hyperlipidemia and vascular disease    Breast Cancer Neg     Ovarian Cancer Neg        There were no vitals filed for this visit.      HPI:    Chief Complaint   Patient presents with    Derm Problem     LOV 07/26/2021 pt present for a skin check with some areas of concern on her face. Pt was seen by another dermatologist on 11/20/2024 and had a biopsy done on the upper thigh. The result was benign. Pt has eczema, pt's father had BCC.     Past notes/ records and appropriate/relevant lab results including pathology and past body maps reviewed. Updated and new information noted in current visit.     Patient with history of keloids, lichenoid keratosis biopsy 2019  with hypertrophic scar slowly improving    Patient with family history of skin cancer  No personal history of skin cancer  Patient with history of lichenoid AK  Patient with history of DSA P    Patient presents with concerns above.    Patient has been in their usual state of health.  History, medications, allergies reviewed as noted.      ROS:  Denies any other systemic complaints.  No new or changeing lesions other than noted above. No fevers, chills, night sweats, unusual sun sensitivity.  No other skin complaints.        History, medications, allergies reviewed as noted.       Physical Examination:     Well-developed well-nourished patient alert oriented in no acute distress.  Exam total-body performed, including scalp, head, neck, face,nails, hair, external eyes, including conjunctival mucosa, eyelids, lips external ears, back, chest,/ breasts, axillae,  abdomen, arms, legs, palms.     Multiple light to medium brown, well marginated, uniformly pigmented, macules and papules 6 mm and less scattered on exam. pigmented lesions examined with dermoscopy benign-appearing patterns.     Waxy tannish keratotic papules scattered, cherry-red vascular papules scattered.    See map today's date for lesions noted .      Otherwise remarkable for lesions as noted on map.  See details of examination  See Assessment /Plan for additional history and physical exam also:    Assessment / plan:    Orders Placed This Encounter   Procedures    Specimen to Pathology, Tissue [[facility] Pt to [facility] lab]       Meds & Refills for this Visit:  Requested Prescriptions     Signed Prescriptions Disp Refills    pimecrolimus (ELIDEL) 1 % External Cream 30 g 2     Sig: Apply 1 Application topically 2 (two) times daily. Use bid to eczema         Encounter Diagnoses   Name Primary?    Neoplasm of uncertain behavior of skin Yes    SK (seborrheic keratosis)     Lentigo     Multiple nevi     Benign neoplasm of skin, unspecified location     Psoriasis and  similar disorder     Dermatitis of eyelid, unspecified laterality, unspecified type     AK (actinic keratosis)        See details on map.      Remarkable for:      Patient with sensitive, red ,atrophic nodule 4x6mm r/o BCC right jawline   Shave/ tangential biopsy performed, operative note and consent in chart further plans pending pathology  Monitor carefully with history of hypertrophic, keloidal scarring    New issue patient with history of eyelid dermatitis.  Has noted flaring of eyelid dermatitis longstanding history of eczema.  Had old tube of Elidel which had been very helpful.  Due to location on the face nonsteroid medications necessary.  More psoriasiform lesions noted in the past.  Medically necessary to continue pimecrolimus.  Chronic facial psoriasiform dermatitis.  Recent flare has been going on.  Patient with recent laryngitis.  Possibly triggered.  Most prominent left upper eyelid in the crease now spreading to right.      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  Given history of hypertrophic scarring caution with any additional biopsies on the chest in particular    History of outside biopsy 11/24 [facility] lentiginous junctional nevus right proximal thigh.  No evidence of recurrence.      Waxy tan keratotic papules lesions in areas of concern as noted reassurance given.  Benign nature discussed.  Possibility of cryo, alphahydroxy acids over-the-counter retinol's discussed.  Reassurance    Moderate sun damage lentigines early AK's along the left jawline observe carefully retinol.  Continue careful sun protection    Disseminated superficial actinic porokeratosis over lower extremities, forearms upper arms some areas on chest reassurance.  Observation.  Unchanged numerous lesions.  Increasing number over the arms  Alphahydroxy acids including lactic acid, glycolic acid, urea, salicylic acid may be helpful.  Suggest  over-the-counter products such as Goldbond psoriasis cream 3% salicylic acid, rough and bumpy, Cerave SA cream, AmLactin, and others as available with these ingredients.    Dermatofibroma right posterior upper arm observed.  Stable    Right foot history of hypertrophic scar monitor  Right forearm waxy tan keratotic papule lesion at mid back, left forearm reassurance regarding benign keratoses.  Fair skin no other suspicious lesions currently  History of AK's, family history skin cancer continue routine skin checks, sun protection    Please refer to map for specific lesions.  See additional diagnoses.  Pros cons of various therapies, risks benefits discussed.Pathophysiology discussed with patient.  Therapeutic options reviewed.  See  Medications in grid.  Instructions reviewed at length.    Benign nevi, seborrheic  keratoses, cherry angiomas:  Reassurance regarding other benign skin lesions.    Monitor for new or changing lesions. Signs and symptoms of skin cancer, ABCDE's of melanoma ( additional information available at AAD.org, skincancer.org) Encourage Sunscreen (broad-spectrum, ideally mineral-based-UVA/UVB -SPF 30 or higher) use encouraged, sun protection/sun protective clothing, self exams reviewed Followup as noted RTC ---routine checkup 6 mos -one year or p.r.n.    Encounter Times   Including precharting, reviewing chart, prior notes obtaining history: 10 minutes, medical exam :10 minutes, notes on body map, plan, counseling 10minutes My total time spent caring for the patient on the day of the encounter: 30 minutes     The patient indicates understanding of these issues and agrees to the plan.  The patient is asked to return as noted in follow-up/ above.    This note was generated using Dragon voice recognition software.  Please contact me regarding any confusion resulting from errors in recognition..  Note to patient and family: The 21st Century Cures Act makes medical notes like these available to  patients. However, be advised this is a medical document. It is intended as [id number] communication and monitoring of a patient's care needs. It is written in medical language and may contain abbreviations or verbiage that are unfamiliar. It may appear blunt or direct. Medical documents are intended to carry relevant information, facts as evident and the clinical opinion of the practitioner.

## 2025-01-06 DIAGNOSIS — E87.6 HYPOKALEMIA: ICD-10-CM

## 2025-01-06 DIAGNOSIS — I10 ESSENTIAL HYPERTENSION: ICD-10-CM

## 2025-01-06 RX ORDER — OLMESARTAN MEDOXOMIL 20 MG/1
40 TABLET ORAL DAILY
Refills: 0 | Status: CANCELLED | OUTPATIENT
Start: 2025-01-06

## 2025-01-08 RX ORDER — OLMESARTAN MEDOXOMIL 40 MG/1
40 TABLET ORAL DAILY
Qty: 90 TABLET | Refills: 0 | Status: SHIPPED | OUTPATIENT
Start: 2025-01-08 | End: 2025-01-08

## 2025-01-08 RX ORDER — POTASSIUM CHLORIDE 1500 MG/1
20 TABLET, EXTENDED RELEASE ORAL 2 TIMES DAILY
Qty: 180 TABLET | Refills: 0 | Status: CANCELLED | OUTPATIENT
Start: 2025-01-08

## 2025-01-08 RX ORDER — OLMESARTAN MEDOXOMIL 20 MG/1
40 TABLET ORAL DAILY
Qty: 180 TABLET | Refills: 0 | Status: SHIPPED | OUTPATIENT
Start: 2025-01-08 | End: 2025-01-13

## 2025-01-08 RX ORDER — POTASSIUM CHLORIDE 750 MG/1
10 TABLET, EXTENDED RELEASE ORAL DAILY
Qty: 90 TABLET | Refills: 0 | Status: CANCELLED | OUTPATIENT
Start: 2025-01-08

## 2025-01-08 NOTE — TELEPHONE ENCOUNTER
Since she is off the hydrochlorothiazide now okay to stop the K supplement and recheck K in 1 month to ensure that her levels stay normal. Please order and inform pt.   Glad to hear her bp has been stable.   The olmesartan she is on will help keep her K up as well.

## 2025-01-08 NOTE — TELEPHONE ENCOUNTER
Last OV relevant to medication: 11/8/24  Last refill date: 11/8/24 #180/refills: 0  When pt was asked to return for OV: 2/8/25  Upcoming appt/reason: Apt with  2/14/25  Was pt informed of any over due labs: utd  Lab Results   Component Value Date    GLU 85 12/27/2024    BUN 10 12/27/2024    BUNCREA SEE NOTE: 12/27/2024    CREATSERUM 0.77 12/27/2024    ANIONGAP 11 12/22/2017    GFRNAA 98 04/15/2022    GFRAA 113 04/15/2022    CA 9.1 12/27/2024    OSMOCALC 281 12/22/2017    ALKPHO 91 11/02/2024    AST 15 11/02/2024    ALT 13 11/02/2024    ALKPHOS 9.8 11/02/2024    BILT 0.7 11/02/2024    TP 6.6 11/02/2024    ALB 4.2 11/02/2024    GLOBULIN 2.4 11/02/2024    AGRATIO 1.8 11/02/2024     12/27/2024    K 4.6 12/27/2024     12/27/2024    CO2 25 12/27/2024     Patient Comment: Coukd this be changed to the 10mg tablets? Do i still need to take same amount given current labwork and no longer taking hydrochlorathiazide? BP has been stable around 100/70     Please see TE 12/19/24- hydrochlorothiazide was discontinued. I pended 10meq per request and asked pt for update on symptoms.    Hypertension Medications Protocol Ibnoec3301/08/2025 12:48 PM   Protocol Details CMP or BMP in past 12 months    Last BP reading less than 140/90    In person appointment or virtual visit in the past 12 mos or appointment in next 3 mos    EGFRCR or GFRNAA > 50    Medication is active on med list      olmesartan 20 MG Oral Tab      Patient Comment: This one was doubled without a new prescription sent therefore my last 90 day supply was more like 45 days.   I sent in Olmesartan 40mg tablets daily and notified pt that 40mg tablet was sent instead, 1 tablet by mouth daily.

## 2025-01-08 NOTE — TELEPHONE ENCOUNTER
Order placed, message sent to pt with updates. BP log in 's bin for review, pt reported on some days she only takes olmesartan 20mg d/t lightheadedness. thanks!    Future Appointments   Date Time Provider Department Center   2/14/2025  1:00 PM Lili Odell MD [facility] [address] [place]   3/11/2025  1:00 PM Bowen Sanchez MD [facility]   4[facility]2025 12:45 PM [facility]   5/12/2025  8:00 AM Bowen Sanchez MD [facility]   5/23/2025 10:15 AM Charity King MD [facility]

## 2025-01-08 NOTE — TELEPHONE ENCOUNTER
Per [employer], prolia requires PA. RN was not given deductible, co-insurance information. CMA/RMA -- please refer to 4/4/23 [employer] message and please follow up on [employer] regarding benefit info. RN will send TE to prior-auth team to assist in obtaining PA. Thanks. Home No

## 2025-01-10 NOTE — TELEPHONE ENCOUNTER
Noted blood pressures. These are on the lower side most of the time. We do not need to have her bp lowered so much. Maybe she can just take the olmesartan 20mg one tablet daily for a month. Monitor bp. If higher than 140/90 take 2 olmesartan 20mg tablets but otherwise just stay on one. Bring in readings to her appt with me next month.

## 2025-01-10 NOTE — TELEPHONE ENCOUNTER
Patient mcm below states occasional twitching, seem to be getting better.    Mcm sent with your recs below. Did you want us to resend a new rx than one sent on 1/8 to reflect your recs below as current rx is for 40 mg daily of olmesartan. Please advise- thanks!

## 2025-01-12 NOTE — TELEPHONE ENCOUNTER
Yes, need to send new rx with correct instructions if she has not already picked up this rx. If she has she should know to take only 1 tablet daily even though sig states 2 tablets daily. Please make the change in the med list so it reflects the correct dose.

## 2025-01-13 RX ORDER — OLMESARTAN MEDOXOMIL 20 MG/1
20 TABLET ORAL DAILY
Qty: 90 TABLET | Refills: 0 | Status: SHIPPED | OUTPATIENT
Start: 2025-01-13

## 2025-02-06 ENCOUNTER — TELEPHONE (OUTPATIENT)
Dept: INTERNAL MEDICINE CLINIC | Facility: CLINIC | Age: 60
End: 2025-02-06

## 2025-02-06 NOTE — TELEPHONE ENCOUNTER
Incoming (mail or fax):  fax  Received from:  [facility]  Documentation given to:  Triage results bin    Lab results

## 2025-02-14 ENCOUNTER — OFFICE VISIT (OUTPATIENT)
Dept: INTERNAL MEDICINE CLINIC | Facility: CLINIC | Age: 60
End: 2025-02-14
Payer: COMMERCIAL

## 2025-02-14 VITALS
BODY MASS INDEX: 30.12 KG/M2 | RESPIRATION RATE: 16 BRPM | TEMPERATURE: 98 F | HEART RATE: 77 BPM | WEIGHT: 167.88 LBS | DIASTOLIC BLOOD PRESSURE: 84 MMHG | HEIGHT: 62.75 IN | SYSTOLIC BLOOD PRESSURE: 120 MMHG

## 2025-02-14 DIAGNOSIS — G43.009 MIGRAINE WITHOUT AURA AND WITHOUT STATUS MIGRAINOSUS, NOT INTRACTABLE: ICD-10-CM

## 2025-02-14 DIAGNOSIS — E78.5 DYSLIPIDEMIA, GOAL LDL BELOW 100: ICD-10-CM

## 2025-02-14 DIAGNOSIS — I10 ESSENTIAL HYPERTENSION: ICD-10-CM

## 2025-02-14 PROCEDURE — 3079F DIAST BP 80-89 MM HG: CPT | Performed by: INTERNAL MEDICINE

## 2025-02-14 PROCEDURE — 99214 OFFICE O/P EST MOD 30 MIN: CPT | Performed by: INTERNAL MEDICINE

## 2025-02-14 PROCEDURE — G2211 COMPLEX E/M VISIT ADD ON: HCPCS | Performed by: INTERNAL MEDICINE

## 2025-02-14 PROCEDURE — 3074F SYST BP LT 130 MM HG: CPT | Performed by: INTERNAL MEDICINE

## 2025-02-14 PROCEDURE — 3008F BODY MASS INDEX DOCD: CPT | Performed by: INTERNAL MEDICINE

## 2025-02-14 RX ORDER — ROSUVASTATIN CALCIUM 5 MG/1
5 TABLET, COATED ORAL NIGHTLY
Qty: 90 TABLET | Refills: 1 | Status: SHIPPED | OUTPATIENT
Start: 2025-02-14

## 2025-02-14 RX ORDER — OLMESARTAN MEDOXOMIL 20 MG/1
20 TABLET ORAL 2 TIMES DAILY
Qty: 180 TABLET | Refills: 1 | Status: SHIPPED | OUTPATIENT
Start: 2025-02-14

## 2025-02-14 RX ORDER — OLMESARTAN MEDOXOMIL 20 MG/1
20 TABLET ORAL 2 TIMES DAILY
Qty: 180 TABLET | Refills: 0 | Status: SHIPPED | OUTPATIENT
Start: 2025-02-14 | End: 2025-02-14

## 2025-02-14 RX ORDER — METOPROLOL SUCCINATE 50 MG/1
50 TABLET, EXTENDED RELEASE ORAL DAILY
Qty: 90 TABLET | Refills: 1 | Status: SHIPPED | OUTPATIENT
Start: 2025-02-14

## 2025-02-14 RX ORDER — SUMATRIPTAN 50 MG/1
TABLET, FILM COATED ORAL
Qty: 9 TABLET | Refills: 1 | Status: SHIPPED | OUTPATIENT
Start: 2025-02-14

## 2025-02-14 NOTE — PROGRESS NOTES
[facility]    CHIEF COMPLAINT:    Chief Complaint   Patient presents with    Follow - Up     10/7/22-pap. 11/29/24-mammo. 5/4/21-colon-repeat 5 years         HISTORY OF PRESENT ILLNESS:  here for follow up.   Htn: Taking meds regularly. No chest pain, no shortness of breath. Bp readings at home are in the 110-125/ range.   Upon check of her bp cuff her cuff is about 11 points higher on the diastolic side.   Taking metoprolol succinate 50mg daily.   Taking olmesartan 20mg daily. Takes an extra 20mg if her bp is higher than 140/90.     She needs refills on other medications.    REVIEW OF SYSTEMS:  See HPI    Current Medications:    Current Outpatient Medications   Medication Sig Dispense Refill    rosuvastatin 5 MG Oral Tab Take 1 tablet (5 mg total) by mouth nightly. 90 tablet 1    metoprolol succinate ER 50 MG Oral Tablet 24 Hr Take 1 tablet (50 mg total) by mouth daily. 90 tablet 1    SUMAtriptan (IMITREX) 50 MG Oral Tab Use at onset; repeat once after 2 HRS-ONLY 2 IN 24 HR MAX 9 tablet 1    olmesartan 20 MG Oral Tab Take 1 tablet (20 mg total) by mouth in the morning and 1 tablet (20 mg total) before bedtime. Take second dose only if bp is higher than 140/90. 180 tablet 1    pimecrolimus (ELIDEL) 1 % External Cream Apply 1 Application topically 2 (two) times daily. Use bid to eczema 30 g 2    Tirzepatide-Weight Management (ZEPBOUND) 12.5 MG/0.5ML Subcutaneous Solution Auto-injector Inject 12.5 mg into the skin once a week. 2 mL 2    ergocalciferol 1.25 MG (94719 UT) Oral Cap Take 1 capsule (50,000 Units total) by mouth. Taking once every 3 weeks      pantoprazole 40 MG Oral Tab EC TAKE 1 TABLET BY MOUTH EVERY MORNING BEFORE BREAKFAST (Patient taking differently: TAKE 1 TABLET BY MOUTH EVERY MORNING BEFORE BREAKFASTAS NEEDED) 90 tablet 3    PROLIA 60 MG/ML Subcutaneous Solution Prefilled Syringe INJECT 1 ML (60 MG TOTAL) UNDER THE SKIN ONE TIME FOR 1 DOSE 1 mL 0    Multiple Vitamins-Minerals  (MULTI-VITAMIN/MINERALS) Oral Tab Take 1 tablet by mouth daily.      Misc Natural Products (GLUCOSAMINE CHOND MSM FORMULA OR) Take by mouth.      Tirzepatide-Weight Management (ZEPBOUND) 15 MG/0.5ML Subcutaneous Solution Auto-injector Inject 15 mg into the skin once a week. (Patient not taking: Reported on 2/14/2025) 6 mL 1       PAST MEDICAL, SOCIAL, AND FAMILY HISTORY:  Tobacco:    History   Smoking Status    Never   Smokeless Tobacco    Never       PHYSICAL EXAM:  /84 (BP Location: Left arm, Patient Position: Sitting, Cuff Size: adult)   Pulse 77   Temp 97.9 °F (36.6 °C) (Temporal)   Resp 16   Ht 5' 2.75\" (1.594 m)   Wt 167 lb 14.4 oz (76.2 kg)   LMP 04/01/2014 (Approximate)   Breastfeeding No   BMI 29.98 kg/m²    GENERAL: well developed, well nourished,in no apparent distress  LUNGS: clear to auscultation  CARDIO: RRR without murmur  MUSCULOSKELETAL:  no edema, muscle strength normal.     DATA:  Results for orders placed or performed in visit on 02/06/25   Comp Metabolic Panel (14)    Collection Time: 02/05/25 12:00 AM   Result Value Ref Range    Glucose 87 mg/dL    Bilirubin, Total 0.5 mg/dL    SODIUM 137 mmol/L    POTASSIUM 3.8 mmol/L    AST 16 U/L    ALT 13 U/L    CHLORIDE 103     CO2 26     CALCIUM 9.0     TP 6.9             ASSESSMENT AND PLAN:  1. Essential hypertension  - metoprolol succinate ER 50 MG Oral Tablet 24 Hr; Take 1 tablet (50 mg total) by mouth daily.  Dispense: 90 tablet; Refill: 1  - olmesartan 20 MG Oral Tab; Take 1 tablet (20 mg total) by mouth in the morning and 1 tablet (20 mg total) before bedtime. Take second dose only if bp is higher than 140/90.  Dispense: 180 tablet; Refill: 1  Continue metoprolol succinate 50mg daily.   Olmesartan 20mg daily and take an extra tablet if bp is higher than 140/90.   Bring in readings and cuff to next visit.   Cmp done. Reviewed. No worrisome findings.     2. Dyslipidemia, goal LDL below 100  Continue statin.   Labs in 6 months.   -  rosuvastatin 5 MG Oral Tab; Take 1 tablet (5 mg total) by mouth nightly.  Dispense: 90 tablet; Refill: 1  - Comp Metabolic Panel (14) [E]  - Lipid Panel [E]    3. Migraine without aura and without status migrainosus, not intractable  - SUMAtriptan (IMITREX) 50 MG Oral Tab; Use at onset; repeat once after 2 HRS-ONLY 2 IN 24 HR MAX  Dispense: 9 tablet; Refill: 1        Return in about 6 months (around 8/14/2025) for med check.      Lili Odell MD

## 2025-02-24 ENCOUNTER — MED REC SCAN ONLY (OUTPATIENT)
Dept: INTERNAL MEDICINE CLINIC | Facility: CLINIC | Age: 60
End: 2025-02-24

## 2025-03-11 ENCOUNTER — OFFICE VISIT (OUTPATIENT)
Dept: GASTROENTEROLOGY | Facility: CLINIC | Age: 60
End: 2025-03-11
Payer: COMMERCIAL

## 2025-03-11 VITALS
HEIGHT: 62.75 IN | WEIGHT: 163.81 LBS | BODY MASS INDEX: 29.39 KG/M2 | DIASTOLIC BLOOD PRESSURE: 82 MMHG | SYSTOLIC BLOOD PRESSURE: 115 MMHG | HEART RATE: 79 BPM

## 2025-03-11 DIAGNOSIS — Z86.2 HISTORY OF IRON DEFICIENCY ANEMIA: ICD-10-CM

## 2025-03-11 DIAGNOSIS — K21.9 HIATAL HERNIA WITH GERD: ICD-10-CM

## 2025-03-11 DIAGNOSIS — K76.89 FOCAL NODULAR HYPERPLASIA OF LIVER: ICD-10-CM

## 2025-03-11 DIAGNOSIS — R10.13 EPIGASTRIC PAIN: ICD-10-CM

## 2025-03-11 DIAGNOSIS — K44.9 HIATAL HERNIA WITH GERD: ICD-10-CM

## 2025-03-11 DIAGNOSIS — K80.50 BILIARY COLIC: Primary | ICD-10-CM

## 2025-03-11 DIAGNOSIS — K21.9 GASTROESOPHAGEAL REFLUX DISEASE WITHOUT ESOPHAGITIS: ICD-10-CM

## 2025-03-11 DIAGNOSIS — M81.0 AGE-RELATED OSTEOPOROSIS WITHOUT CURRENT PATHOLOGICAL FRACTURE: ICD-10-CM

## 2025-03-11 PROCEDURE — 3079F DIAST BP 80-89 MM HG: CPT | Performed by: INTERNAL MEDICINE

## 2025-03-11 PROCEDURE — 3008F BODY MASS INDEX DOCD: CPT | Performed by: INTERNAL MEDICINE

## 2025-03-11 PROCEDURE — 99214 OFFICE O/P EST MOD 30 MIN: CPT | Performed by: INTERNAL MEDICINE

## 2025-03-11 PROCEDURE — 3074F SYST BP LT 130 MM HG: CPT | Performed by: INTERNAL MEDICINE

## 2025-03-11 NOTE — PROGRESS NOTES
**  SCROLL DOWN FOR HPI **           ASSESSMENT/PLAN:   No diagnosis found.    Recent .    59 year old woman with history of elevated BMI, hypertension, dyslipidemia, cholecystectomy for cholelithiasis in 2010, known to me from previous evaluations for bloody diarrhea in 2012, abdominal pain previous visits and recent concern for iron deficiency anemia.    Reassuring pan-endoscopy exam 5/4/2021 above for evaluation of the iron deficiency anemia.    Iron deficiency anemia appears to have resolved with cessation of previous NSAID use.    Dramatic weight loss over the course of 2024 on Zepbound medication.    Returns for follow-up 11/2025 overall much better with ongoing but milder/manageable attacks of upper abdominal discomfort previous intense pain which can radiate to the back.      Suggest:    Recurring syndrome of episodic \"intense,\" \"stabbing\" epigastric upper abdominal pain radiating to back with vomiting      Recurring colicky focal epigastric symptoms most likely would relate to GE junction or gastric process, biliary event, pancreatitis.  Repeated EGDs showing only large hiatal hernia  Ms. Yanez voices concern for possible SOD follow-up visits 9/15/2023 and 3/11/2025; Repeatedly normal biliary tree on imaging despite finding of 2+cm periampullary diverticulum on endoscopy examination; no biliary dilation; no recent spikes in LFTs as below  No documented abnormal LFTs here; hepatic transaminases consistently well within normal range as per CMP summary below  On follow-up visit 3/11/2025, after losing about 100 LBS on the Zepbound medication, Kianna describes noticing a threshold or limit with meal size which as she approaches it leads to the beginning of this upper abdominal discomfort --> pain and which will level off and (eventually) resolve if she stops eating.  She seems to be managing this pain better by stopping eating when she feels this discomfort creeping up.  Eating after a long fast,  essentially on empty stomach is most likely to cause symptoms.  Rice seems to cause symptoms.  I provided another order again today 3/11/2025 to draw CMP and lipase during or immediately after one of these episodes to seek documentation of any biliary process or pancreatitis  Consideration for hiatal hernia/paraesophageal hernia symptoms as below    Large hiatal hernia/?paraesophageal hernia, GERD symptoms, intense episodic epigastric pain     See EGD examinations 2017 and May 2021  Currently taking pantoprazole 40 mg on an as-needed basis fairly rarely after significant recent weight loss, seems to be doing very well without daily PPI dosing.  Normal CBC even off the PPI medication would argue against chronic bleeding from the large hiatal hernia.    As above; recurring acute extreme pain episodes in the absence of any culprit findings on endoscopy or imaging/labs concerning for intermittent strangulation or other event from the large hiatal hernia/paraesophageal hernia.  We previously and today discussed consideration for fundoplication surgery.  Ms. Yanez has researched that surgery and seems to be doing very well on follow-up 3/11/2025.  Not interested in surgical intervention at this point which seems very appropriate.    Metabolic syndrome, hepatic steatosis fatty liver disease      Hepatic steatosis, mild hepatomegaly 19.2 cm craniocaudal liver dimension on MRI scan 2019 above.    BMI Readings from Last 5 Encounters:   03/11/25 29.25 kg/m²   02/14/25 29.98 kg/m²   11/21/24 32.54 kg/m²   11/08/24 33.17 kg/m²   07/22/24 37.44 kg/m²      CMP 10/7/2017 shows AST 20 ALT 18 alkaline phosphatase 76 total bilirubin 0.8  CMP 4/22/2023 shows AST 15 ALT 16 alk phosphatase 98 and serum albumin 4.5  CMP 10/14/2023 shows AST 16 ALT 14 alk phosphatase 104  CMP 11/2/2024 shows AST 15 ALT 13 alk phosphatase 91  CMP 2/5/2025 shows AST 16 ALT 13 alk phosphatase 77; serum albumin 4.5    Repeatedly reassuring LFTs as  above    Now on Zepbound weight loss medications dramatic result  Currently taking rosuvastatin medication, likely at the fibrogenic benefit for the liver    R lobe liver lesion on imaging      1.4cm arterially enhancing lesion at the inferior right hepatic lobe, perhaps minimally intervally increased in conspicuity since 2017. This is favored to reflect focal nodular hyperplasia.  Lesion stable from 11/6/2017 scan to follow-up scan 5/11/2019  No subsequent MRI scan apparently due to insurance refusal  Follow-up ultrasound 10/19/2021 again shows hepatic steatosis, 1.7 x 1.4 cm lesion right lobe    Previous iron deficiency anemia [id number]       May relate in part to large hiatal hernia on EGD examinations of 2017 and May 2021  2 prolonged courses of daily naproxen therapy March-April 2020 and November-December 2020  1 Reclast infusion administered 8/3/2020  Reassuring pan-endoscopy examination 5/4/2021  Started once daily iron therapy 5/5/2021; DC'd NSAIDs; CBC and iron studies have recovered  Last CBC was normal/reassuring November 2024  If iron deficiency anemia recurs, consideration for further work-up including small bowel evaluation, iron infusions     Osteopenia     See recent DEXA scan below  Previous Prolia infusions  Mother struggling with fairly severe complications, multiple bone fractures due to osteoporosis  Caution with PPI medications  Not currently on steroid therapy    Colon cancer screening, history of 6 mm sessile serrated adenoma colonoscopy examination May 2021    Initial colonoscopy examination with me 5/3/2012 reassuring, no acute process per indications of acute diet bloody diarrhea; no colon polyps.  Repeat colonoscopy examination recommended May 2026.                   Prior to this encounter, I spent over 10 minutes preparing for the visit, including reviewing documents from other specialties as well as from PCP and going over test results. During and after the face to face encounter, I  spent an additional 50 minutes today discussing the above and counseling and educating this patient, reviewing chart notes and data and documenting clinical information in the EHR, independently interpreting results and communicating results to the patient/family and composing this comprehensive note, ordering medications or testing, referring and communicating with other healthcare providers, and care coordination with the patient's other providers.      Digital transcription software was utilized to produce this note. The note was proofread for content only. Typographical errors may remain.        No orders of the defined types were placed in this encounter.      Office visit 3/11/2025:   HPI:    Patient ID: Kianna Yanez is a 59 year old woman with history of elevated BMI, hypertension, dyslipidemia, cholecystectomy in 2010.  She has an unusually large hiatal hernia/paraesophageal hernia described on repeated EGD examinations.    Kianna returns today for follow-up.  She looks and sounds great.    Crazy news.  After starting the Zepbound medication February 2024, she has lost 100 LBS past year.  Sounds like this medication is limiting her appetite and intake.  Eating less per meal these days.    Kianna is feeling great.  She states this is at least the second time in her life that she has lost this much weight.  Hoping to stay on the Zepbound and keep this weight off.    Previous GERD symptoms have nearly resolved with this weight loss.  She describes rare heartburn symptoms, taking the pantoprazole fairly rarely on an as-needed basis.  She is taking pantoprazole less than once per month, certainly less than once per week.  She is aware of concerns for kidney damage, osteopenia osteoporosis with long-term PPI use.  She has at least osteopenia.    Previous iron deficiency anemia remains resolved.  Then and now, there was concern for NSAID, naproxen use after her foot surgeries.  Recent CBCs have been  reassuring, most recently CBC 11/2/2024 showing hemoglobin 12.8g MCV 91.6.  No recent iron studies.    We have previously discussed intense recurring \"attacks\" of upper abdominal pain that radiates to the back.  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  So far we have not caught abnormal labs or biliary dilation on imaging.  I have questioned whether this could be symptoms from the large hiatal/paraesophageal hernia.    We have discussed surgery for the hiatal hernia/paraesophageal hernia.  Sounds like Dionne has researched and read about the significant morbidity, mixed outcomes from that surgery.  She has not pursued that option further.    These attacks of pain have been better over the past 1-2 years.  It seems like she can manage them now.  These seem to be related to the volume that she eats.  If she does not cross a certain threshold of meal size and meal types, it sounds like either there are no symptoms or as soon as she starts to feel symptoms creeping up they will level off if she stops eating.  As seems to be working pretty well for her.    Her Apple Watch has alarmed due to high heart rate, HR 120s with eating large meals.    Kianna describes significant stress, personal challenge managing her mother's health care and recent complex events.  Her mother was hospitalized for ileus or bowel obstruction, found to have what was likely a new vertebral compression fracture which Dionne noticed and brought to light with treating doctors.  Sounds like it was somewhat unpleasant, contentious situation.    Since then, her mother has had a series of complications after a near surgery whereby she had knee replacement surgery, then femoral fracture above the hardware, then hip fracture after that.  The right hip fracture in the setting of previous hardware and surgeries had to be completed at a trauma center.    Mother is very sensitive to medications including bisphosphonates,  sounds that she has had history of esophagitis/esophageal injury.    Kianna is here to find an effective PCP to manage her mother's complex case.  It has fallen on her to monitor her mother's care through MyChart and doctor visits.    ======================  Previous visit 9/15/2023:     Kianna Yanez is a woman with history of elevated BMI, hypertension, dyslipidemia, cholecystectomy in 2010.  She has an unusually large hiatal hernia/paraesophageal hernia described on repeated EGD examinations.    Kianna returns today for follow-up and she is looking and sounding absolutely great.  It was really nice to see her today.    Issues reviewed today:    1.  Previous iron deficiency issue has been resolved.    In retrospect sounds like it was the previous naproxen medication.    Kianna was able to recover her iron levels taking only oral/pills of iron, no infusions.    2.  Episodic intense RUQ abdominal pain    RUQ abdominal pain episodes continue, a bit less frequent than last year.  Kianna went a full year June 2022-June 2023 between severe episodes.  Milder episodes during that 1 year of RUQ/abdominal discomfort which were aborted when she would stop eating and rest.    Every now and then, if she eats a significant meal on a completely empty stomach and fails to stop eating in time, if she crosses a certain threshold she will suffer an episode of severe intense postprandial pain, approximately 2 hours.  Pain can feel \"excruciating.\"    This pain reminds her of her previous gallstone attacks in the 2000's.    Interestingly, these episodes have also been triggered by hydrocodone pills taken on an empty stomach.    Previously, I had discussed my concern with her large hiatal hernia, question of symptoms even gastric torsion.  Kianna remembers.    Kianna has researched the fundoplication surgery and is not impressed with the risks, pain after the procedure and mediocre results in the years following the  procedure.  She is not ready to seek surgical consultation for fundoplication surgery.  We briefly discussed the surgery again today.    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  We discussed SOD today.    On brief review of her labs, I do not yet see any significant previous spikes in LFTs.  Previous abdominal images showed normal caliber biliary tree.  We briefly discussed work-up and seeking objective evidence for SOD today.    Dionne was previously diagnosed with osteoporosis.  She is aware of osteoporosis risk with PPI medications.  She has been on Prolia infusions, and has made some progress, now apparently osteopenia.  We briefly discussed the known and possible risks of PPI medication including recent bad press regarding dementia.    ====================  Previous visit 7/6/2022:     Kianna Yanez is a woman with history of elevated BMI, hypertension, dyslipidemia, cholecystectomy in 2010.  She has an unusually large hiatal hernia/paraesophageal hernia described on repeated EGD examinations.    She returns for follow-up today regarding ongoing episodic upper abdominal symptoms.    As before, Ms. Yanez continues on once daily pantoprazole with strict compliance.  EGD examination in 2021 showed no reflux esophagitis despite her large hiatal/paraesophageal hernia.    Ms. Yanez comes in today to discuss ongoing episodes of intense colicky upper abdominal pain.    Recent patterns:  Episodes occur only out at restaurants, even with light meals.  Often associated with having half or 1 drink of alcohol, never more.  No history of binge drinking, certainly never hung over or headaches the next day.  Symptoms occur after very small helpings of likely heavier meals at restaurants or other consistent triggers.  Strangely, eating leftovers, same exact meal or pizza at home does not provoke the symptoms.  She questions whether there is something  about how she sits or eats at restaurants that could be a factor.  Ms. Yanez again describes episodes of rapid onset of intense epigastric abdominal pain.  Her notes this time from a fairly severe    Two recent episodes 2022 and 2022 include descriptions of \"intense\" and \"stabbing\" epigastric pain that radiates to the back.  Pain was so severe with these past 2 episodes it was hard to breathe.  She vomited small amounts nonbloody each time with some questionable relief.  The episode of 2022 was the most severe yet.  It lasted almost exactly 2 hours from 6:15 PM until 8:15 PM.  She vomited sometime around 7 PM.  It again resolved spontaneously before she decided to come in to the ED.    We had discussed going for routine labs including CMP and lipase with previous episodes which she has not done.  Symptoms may have improved; that order has since .    As per previous notes, s/p 2 foot surgeries in , significant NSAID use back then.    Previous concern for iron deficiency anemia as below.    History of cholecystectomy surgery  as per previous notes.  No previous documented pancreatitis.    ====================  Previous visit 2021:     Kianna Yanez is a woman with history of elevated BMI, hypertension, dyslipidemia, cholecystectomy in .     She returns for follow-up today after recent telephone encounters and panendoscopy for evaluation of iron deficiency anemia.    Pertinent history:  Menopause, LMP 2012  12-week course of naproxen after 1st foot surgery 2020  Single Reclast infusion 8/3/2020  Repeatedly normal H&H through labs of 10/17/2020.  Repeat 12-week course of naproxen after 2nd foot surgery 11/3/2020  New microcytic anemia on CBC as of 3/13/2021 and 4/15/2021 with hemoglobin 11.1g mcv 78.5 on 4/15/2021.  Iron studies same day consistent with iron deficiency anemia.  Pan-endoscopy examination 2021 as below.    Reassuring pan-endoscopy examination  5/4/2021 as copied below.  Same large hiatal hernia but no evidence for chronic bleeding that day.  Reassuring biopsy results with small sessile serrated adenoma polyp.    As per recent phone calls and messages, Kianna has lots of questions.  She is very concerned that Reclast caused or contributed to her anemia.  We reviewed time course above, concern for TWO 12-week courses of daily naproxen which was likely a major factor and chronic bleeding and anemia.  She is concerned that the anemia only appeared after the Reclast and after the 2nd dose of naproxen.  She is of course off the naproxen as of the panendoscopy and this follow-up.     Reclast is a once per year infusion; no plans to repeat that at this point.    Kianna started taking iron supplements 1 pill/day after panendoscopy examination.  Even 1 pill/day has been constipating.    Kianna is an omnivore, estimates one meal of red meat per week, pushes spinach, aware of trying to get iron in her diet.    ======================  Previous visit 10/20/2017:     Kianna Yanez is a woman with history of elevated BMI, hypertension, dyslipidemia, cholecystectomy in 2010.  She returns today for follow-up of abdominal symptoms and question of progression, worsening of previous hiatal hernia on cardiac CT scan December 2016.    Body mass index is 29.25 kg/m².    During previous visit 2013, I did question previous episodic abdominal pain as possible biliary colic in this postcholecystectomy patient.  MRI scan recommended but apparently not completed.    Ms. Yanez comes in today referred back by Dr. Baker describing episodes of intense upper abdominal pain every few weeks to months.  Her last episode was February 2017.  This is always during dinner.  She describes the acute onset of severe pain usually with vomiting with dinner.  Pain is epigastric and right upper quadrant.    No recent workup for this concern.    Cardiac calcium score CT scan 12/24/2016  showed incidental finding of interval progression of previously noted hiatal hernia on previous CT scan April 2012.    We previously performed colonoscopy examination for diarrhea.  No previous EGD examination.    ===================       Patient:                               KIANNA DA SILVA  YOB: 1965  Date:                                   09/13/2013 10:01 AM   Historian:                           self  Visit Type:                         Office Visit        This 47 year old  patient was referred by GAB SPENCER for evaluation.  The patient is a 47 year old female who presents with flank pain (right).     History of Present Illness:  1.  Flank pain (right)      Kianna Da Silva returns for evaluation of a new concern after previous evaluation April and May of 2012 for severe diarrhea lasting over 4 weeks which had turned bloody. Colonoscopy and biopsies for that concern was essentially normal.     Ms. Da Silva has a history of cholecystectomy surgery in March of 2010 here at [facility], apparently for gallstones by the operative report and pathology. Intraoperative cholangiogram was performed and was negative. She does not recall similar such pain at that time.     Previous history is also significant for prior GERD symptoms. Ms. Da Silva was taking omeprazole before the recent weight loss and improvement in her GERD symptoms. She weaned this off earlier this year as the symptoms resolved.     Ms. Da Silva presents to discuss right upper quadrant episodic pain since approximately spring of 2013. She describes 20-30 minute episodes of right upper quadrant pain which appear to occur at random. Pain episodes are occurring once or twice per week. No obvious history of relation to heavier/greasy meals; she takes 5-6 small light meals per day. No obvious relation to eating.     The only consistent triggers are Advil/ibuprofen use either on their own or with cold remedies. She took a  dose of hydrocodone for back pain recently and that caused one hour of the most severe right upper quadrant pain she has had yet.     No other findings on review of systems. She suffered postprandial diarrhea after the cholecystectomy surgery which resolved.     Recent history is significant for her being on a special diet this year. She has lost 72 pounds on a diet which includes taking small light meals 5 or 6 times per day.     Labs this year showed slight uptick in hepatic transaminases even with recent weight loss.     AST 28, ALT 45 on May 4, 2013  AST 18, ALT 12 2012  AST 16, ALT 2012  AST 18, ALT 2011     Alkaline phosphatase previously mildly elevated at 104, 105; was 99 in May 2013        Assessment and plan:     47-year-old woman with previous concern for hypertension, dyslipidemia who has lost over 70 pounds on recent diet as above. History of cholecystectomy with normal IOC for cholelithiasis in 2010. She presents to discuss several months of episodic right upper quadrant pain once or twice per week with the only noted triggers being ibuprofen and a single dose of hydrocodone. Possibilities include functional, idiopathic which would be a bit atypical with the episodic nature these complaints; other possibilities include gastric or duodenal process with her off previous PPI medication or biliary colic. I recommended the followin. Trial of resuming PPI medication. Previous omeprazole use for GERD symptoms.     2. MRI with MRCP to evaluate for biliary colic, retained or de nubia ductal stone     3. Consider EGD examination. Ms. Yanez is not ready to consider invasive testing at this point. Await above tests and trial of PPI medication.     4. Next labs would be her annual employer sponsored wellness check labs in November. Could repeat sooner if symptoms change.     Call or return to see me as symptoms dictate.     ===================    GI PROGRESS  NOTE  ___________________________________________________________________________________________     PATIENT:                                                 Kianna Yanez  YOB: 1965 46 Years old  DATE:                                                      04/26/2012 2:30 PM  MEDICAL RECORD NUMBER:              [id number]  ICE     VISIT TYPE:                                             Consult   ___________________________________________________________________________________________     This 46-year old female patient was referred by GAB BAKER for evaluation and treatment.     Chief Complaint/Reason for visit:  This 46 year old female presents with colitis and nausea.     History of Present Illness  1.  colitis   2.  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 She has history of uncomplicated cholecystectomy surgery. She is referred by Dr. Baker for assessment of approximately 4 week history of diarrhea which has turned bloody.     She first suffered several days of diarrhea the first week of April, possibly around April 3rd. This resolved spontaneously and she was well for at least 10 days. The diarrhea resumed April 17 and seemed to be improving April 18th. It abruptly worsened April 19th and she estimates she passed about 5 stools that day. She continued to worsen that night and through April 20th. She estimates passing at least 8 stools during the day April 20th and about 8 more that night. Stools became bloody that evening. She describes passing bloody mucus with her stools. She describes crampy abdominal pain which at times was severe associated with the diarrhea. She was quite alarmed by the sight of blood and presented to the emergency room on April 21st. She was evaluated there with labs and CT scan which did show abnormal distal inflammatory  changes in the colon. After telephone consultation with Dr. Baker she was discharged from the emergency room on ciprofloxacin which she took from that day forward. Bacterial stool cultures were sent from the emergency room and have come back negative.      She continued on a clear liquid diet with minimal solids over the weekend, and saw Dr. Baker on the 23rd for followup.  She was still quite ill that day, and reports being markedly hypotensive on that evaluation. She is continued on ciprofloxacin since as well as the gland \"wrap\" diet. She began suffering nausea on April 24 which continues.  The diarrhea actually is improving. Her last loose stool was yesterday morning, meaning that she has gone over 24 hours without an episode of diarrhea.     Prior to early April, these events are unprecedented. No previous history of abnormal diarrhea or rectal bleeding. No previous concern for abnormal weight loss.     No obvious exposures or food poisoning events. In late March, she did meet friends at a restaurant for a meal which included oysters and a tuna tartare. Of 4 people sharing that meal, one other person was hospitalized with acute gastrointestinal syndrome. She vomited that night after having that meal and some alcohol. No more recent exposures that she can recall prior to the first or second episode of diarrhea in April.     No previous colonoscopy examination.     No family history of inflammatory bowel disease or gastrointestinal malignancies.    ====================    5/11/2019      MRI LIVER (W+WO) (CPT=74183)       COMPARISON: [facility], MRI ABDOMEN+MRCP (ALL W+WO) (CPT=74183), 11/06/2017, 9:08.  [facility], US GALL BLADDER, 2/07/2010, 22:58.  [facility], CT ABDOMEN & PELVIS WO CON, 4/21/2012, 14:18.       INDICATIONS: Hepatic lesion (focal nodular hyperplasia).       TECHNIQUE: A comprehensive MRI examination of the abdomen was performed utilizing  a variety of imaging planes and imaging parameters to optimize visualization of suspected pathology.  Images were obtained both before and after intravenous gadolinium   injection.         FINDINGS:     COMMENT: On initial images, no contrast was infused, likely related to contrast infiltration during injection. The patient was recalled for contrast administration and acquisition of postcontrast images.   LUNG BASES: There is dependent signal abnormality suggesting subsegmental atelectasis bilaterally.   LIVER: Enlarged, measuring 19.2 cm craniocaudally. There is mild loss of signal intensity on the out-of-phase images relative to the in-phase sequence, compatible with hepatic steatosis. Heterogeneous areas of relatively preserved T1 hyperintensity are compatible with focal fatty sparing.   At the border of segments or V and VI, there is a 1.4 cm diameter arterial enhancing lesion (repeated series 4, image 55). This is inconspicuous on the remaining sequences, without clear corresponding T2 signal abnormality or evidence of diffusion restriction. No delayed enhancement is identified. There is no evidence of washout. There is no evidence of central scar.   BILIARY:   The gallbladder is surgically absent with susceptibility artifact from cholecystectomy clips in the gallbladder fossa. No intrahepatic or extrahepatic biliary dilatation is apparent. A short cystic duct remnant is observed. The common bile duct measures 0.2-0.3 cm. No suspicious filling defects are seen. No focal dominant stricture formation is evident.     PANCREAS: There is preservation of normal signal intensity on precontrast T1-weighted, fat-saturated images. No focal mass or main pancreatic duct dilatation is seen. There is no evidence of pancreatitis.   SPLEEN: No enlargement. There is a small splenule.   ADRENALS: Unremarkable, without focal mass or enlargement.     KIDNEYS: No hydronephrosis or solid masses are apparent. Numerous  well-circumscribed T2 hyperintense peripelvic lesions are seen in both kidneys. Most are too small to fully characterize, but likely represent cysts.     VASCULATURE: The portal vein, IVC, splenic, hepatic, renal veins, and mesenteric veins are patent.     LYMPH NODES: No lymphadenopathy is detected.     ABDOMINAL WALL: Mild dependent subcutaneous edema is appreciated.     BONES: Suboptimally assessed by scan protocol, but without grossly apparent fracture. A vague T2 hyperintense focus in the L2 vertebral body may represent a hemangioma.       OTHER: A large hiatal hernia is evident with significant intrathoracic herniation of the stomach. No loculated fluid collections are appreciated.               CONCLUSION:   1. There is re-identification of an arterially enhancing lesion at the inferior right hepatic lobe, perhaps minimally intervally increased in conspicuity since 2017. This is favored to reflect focal nodular hyperplasia. A follow-up study is recommended in 1 year with Eovist hepatobiliary excreted contrast agent.       2. Hepatomegaly, perhaps with minimal hepatic steatosis.       3. Status post cholecystectomy without significant hepatobiliary dilatation.       4. Large hiatal hernia with significant intrathoracic herniation of the stomach.       5. Lesser incidental findings as above.               Dictated by (CST): Ugo Mcclain MD on 5/11/2019 at 7:38         ====================  10/19/2021 : US LIVER (CPT=76705)       COMPARISON: MRI ABDOMEN+MRCP (ALL W+WO) (CPT=74183), 11/06/2017, 9:08 AM.  [facility], MRI LIVER (W+WO) (CPT=74183), 5/10/2019, 6:30 PM.  CT ABDOMEN & PELVIS WO CON, 4/21/2012, 2:18 PM.       INDICATIONS: Focal nodular hyperplasia of liver       TECHNIQUE:   The liver was evaluated with gray scale and colorflow of the main vessels.         FINDINGS:   LIVER:   Borderline in size. There is diffusely increased parenchymal echogenicity and heterogeneous, coarse  echotexture, compatible with hepatic steatosis. These features impair penetration of the acoustic beam and reduce sensitivity for detection of hepatic lesions. Within these parameters, focal hypoechogenicity in the right hepatic lobe is seen measuring up to 1.7 x 1.4 cm. There is no intrahepatic biliary ductal dilatation.     GALLBLADDER/CBD: The gallbladder is surgically absent. The common bile duct is normal in caliber, measuring 0.3 cm.   RIGHT KIDNEY: Measures 9.1 cm in length. Survey sagittal images demonstrate no collecting system dilatation. There are no visible calculi.     PANCREAS: Visualized portions of the pancreatic head and body have a grossly unremarkable sonographic appearance. Views of the tail are obscured by intervening bowel gas.   OTHER:   Patency and normal hepatopetal flow directionality of the main portal vein is demonstrated with velocity recorded as 36 cm/s.           CONCLUSION:   1. There is a 1.7 cm apparent lesion in the right hepatic lobe, which may reflect the patient's reported history of focal nodular hyperplasia.       2. Hepatomegaly with sonographic features of hepatic steatosis.   3. Status post cholecystectomy without significant hepatobiliary dilatation.   4. Lesser incidental findings as above.                 Dictated by (CST): Ugo Mcclain MD on 10/19/2021 at 10:12 AM         ====================  11/29/2024:  XR DEXA BONE DENSITOMETRY (CPT=77080)     COMPARISON:  None.     INDICATIONS:  M81.0 Osteoporosis, unspecified osteoporosis type, unspecified pathological fracture presence     PATIENT STATED HISTORY: (As transcribed by Technologist)  Screening for osteoporosis.          LUMBAR SPINE ANALYSIS RESULTS:      Bone mineral density (BMD) (g/cm2):  0.841    Lumbar T-Score:  -1.9      % young normals:  80      % age matched controls:  93      Change from prior spine examination:  n/a              TOTAL HIP ANALYSIS RESULTS:        Bone mineral density (BMD) (g/cm2):   0.926      Total Hip T-Score:  -0.1      % young normals:  98      % age matched controls:  110      Change from prior hip examination:  n/a              FEMORAL NECK ANALYSIS RESULTS:        Bone mineral density (BMD) (g/cm2):  0.771      Femoral neck T-Score:  -0.7      % young normals:  91      % age matched controls:  108      Change from prior hip examination:  n/a            ADDITIONAL FINDINGS:  No significant additional findings.           CONCLUSION:  Bone mineral density values for lumbar spine are compatible with the diagnosis of osteopenia by WHO definition (T score between -1.0 and -2.5).  If therapy is initiated, follow-up study is recommended in 2 years for further evaluation of therapeutic efficacy.       Recommendation:  The Bone Health and Osteoporosis Foundation (BHOF) recommends pharmacological treatment for patients with a FRAX 10-year risk score of 3% or higher for a hip fracture or 20% or higher for a major osteoporotic fracture, to prevent osteoporosis and reduce fracture risk.  All treatment decisions require clinical judgment and consideration of individual patient factors, including patient preferences, comorbidities, previous drug use, risk fractures not captured in the FRAX model (e.g. frailty, falls, vitamin D deficiency, increased bone turnover, interval significant decline in bone density) and possible under- or over-estimation of fracture risk by FRAX.  Additional information regarding the FRAX model can be found at the BHOF website: bonehealthandosteoporosis.org.     The World Health Organization has defined the following categories based on bone density:  Normal bone:  T-score greater than or equal to -1  Osteopenia: T-score  less than -1 and greater  than -2.5  Osteoporosis:  T-score less than or equal -2.5         Dictated by (CST): Alvaro Carolina MD on 11/29/2024 at 1:18 PM     ====================    [facility] [place]     VELMA DA SILVA     DATE OF  PROCEDURE:      May 3, 2012     PREOPERATIVE DIAGNOSES:     1.  Diarrhea with hematochezia.  2.  Abnormal CT scan of the left colon.                   POSTOPERATIVE DIAGNOSIS:  See impression.                   PROCEDURES:  Colonoscopy.     SURGEON:  Bowen Sanchez M.D.                   SEDATION:  MAC anesthesia provided by the Anesthesia Service in the endoscopy suite.                   INDICATIONS:  1.  Diarrhea with hematochezia.  2.  Abnormal CT scan of the left colon.      OPERATIVE PROCEDURE:  After the nature and risks of colonoscopy examination with likely colonic biopsy were discussed with Ms. Yanez and her questions were answered, informed consent was obtained.  She was interviewed by the anesthesiologist.  She was sedated as above.  Digital rectal examination was performed which showed no masses.  The Olympus pediatric video colonoscope was placed in the patient's rectum and advanced under direct visualization through the entire length of the colon up to the cecum and into the terminal ileum.  Cecum was confirmed by landmarks including appendiceal orifice, cecal strap, and ileocecal valve.  The quality of the prep was good.  Retroflexion was performed in the rectum.      COLONOSCOPY FINDINGS:  The cecum and terminal ileum were entirely normal.  Two photos were taken of normal ileal mucosa.  The ascending colon, hepatic flexure, transverse colon, splenic flexure, descending, sigmoid, and rectum showed entirely normal mucosa throughout with normal vascular pattern and visible inflammation.  Random biopsies were taken from the transverse, descending, and sigmoid colonic mucosa.  Retroflexed in the rectum showed small internal hemorrhoids.  The scope was straightened, air suctioned out, and the scope was withdrawn from the patient.  She tolerated the procedure well.      IMPRESSION:     1.  Normal colonic and ileal mucosa throughout in this patient with recent syndrome of diarrhea and hematochezia  as well as abnormal CT findings of the splenic flexure, descending, and sigmoid colon.  Essentially normal examination today.  Random colonic mucosal biopsies were obtained.  2.  Mild incidental diverticulosis of the descending and sigmoid colon.  3.  Small but engorged internal hemorrhoids.       RECOMMENDATIONS:     1.  Followup recent stool studies and above colonic biopsies.  2.  We would continue observation for now.  Ms. Yanez has completed a course of ciprofloxacin antibiotic for her severe symptoms.  3.  Followup with me as needed if symptoms persist.        Electronically Approved by:  Bowen Sanchez M.D., CB  cc:        Rizwana Baker([employer])     TID: [id number]  DD: 2012 10:36:04 AM  DT: 2012 3:21:00 PM  [id number]    ====================    [facility]    Kianna Yanez   MR #: [id number]   : 1965   PHYSICIAN: Bowen Sanchez MD       DATE OF PROCEDURE:   2017                                                         PREOPERATIVE DIAGNOSES:   1.  Episodic severe epigastric and upper abdominal pain, postprandial.  2.  Abnormal GE junction, large hiatal hernia on recent CT scan of the chest of 2016.    POSTOPERATIVE DIAGNOSIS: See impression.     PROCEDURES: EGD.    SURGEON: Bowen Sanchez MD.     SEDATION: MAC anesthesia provided by the anesthesia service.  MAC anesthesia requested due to anxiety regarding the procedure, elevated BMI, concern for intolerance for this procedure under conscious sedation medications.     EGD PROCEDURE:  After the nature and risks of EGD examination under MAC anesthesia were discussed with Ms. Yanez and her questions answered,  her informed consent was obtained.  She was interviewed and examined, sedated by the anesthesia service.  Once sedated, the Olympus adult diagnostic gastroscope was advanced through the oropharynx down the esophagus through the stomach and pylorus to the duodenal bulb,  second, third, and fourth portions of the duodenum.  Retroflexion was performed in the stomach.     EGD FINDINGS:  There was indeed a 6+ centimeter hiatal hernia seen at the GE junction.  Large hiatal hernia sac straddling the diaphragmatic pinch.  Photographs taken in forward and retroflexed views.  Ms. Yanez actually just had a recent episode of the intense chest pain.  Today, there was no inflammatory change or suggestion of recent torsion, volvulus, ischemia.  Entirely normal-appearing gastric mucosa going up into this hiatus hernia sac.  No Osmani's ulcers.    Distally, there was mild chronic atrophic gastritis seen in the mucosa of the body and antrum of the stomach.  The pylorus was normal.  Random gastric mucosal biopsies were obtained to further evaluate.    Further in, the duodenal bulb was entirely normal.  The second and third portions of the duodenum showed a large 2-3 mm periampullary duodenal diverticulum.   This was in the usual location of the major papilla.  Carefully torquing, angling, twisting in the forward view and gastroscope around the sweep of the duodenum, I could not get a definitive view of the major papilla to establish proximity to the significant diverticulum.  I drove the scope into the diverticulum and did not see the papilla inside of this.  After careful look around in the duodenum and its diverticulum, the scope was withdrawn to the stomach.  Air and secretions were suctioned out and the scope was withdrawn from the patient.  She tolerated today's procedure well.       IMPRESSION:    1.  Large hiatal hernia as suggested by recent CT scan, possibly early paraesophageal hernia.  Unclear if this relates to the patient's recent intense symptoms.  Normal visualized stomach up in this hiatus hernia sac as above.  2.  Mild, chronic atrophic gastritis as above.  Random gastric mucosal biopsies obtained.  3.  A 2+ centimeter periampullary diverticulum of uncertain relation to this  patient's intense colicky pain with dinners.     RECOMMENDATIONS:    1.  Followup above gastric mucosal biopsy result.    2.  Followup above H. pylori RUY test result.  A 15-minute after the biopsy, that H. pylori RUY test remains negative.    3.  Recent MRI, MRCP of 11/06/2017 reviewed today by telephone with Dr. Wagner of radiology.  There certainly is no biliary dilation.  The periampullary diverticulum is appreciated on the MRI scan.  Though we have no biliary dilation, normal LFTs recently, I will discuss with Ms. Yanez whether she would want to consider ERCP for further evaluation of these findings and symptoms.    4.  If further workup of the hiatal hernia, possible paraesophageal hernia is desired, could perform upper GI x-ray series next.  Unclear whether these significant risks of Nissen fundoplication procedure would be warranted at this point to correct this large hiatal hernia.          Electronically Approved by:  Bowen Sanchez MD        CB        TID: [id number]   DD:  12/01/2017   DT:  12/01/2017         CC:  Rizwana Baker MD.  ====================    EGD AND COLONOSCOPY PROCEDURE REPORTS:     DATE OF PROCEDURE:  5/4/2021     PCP: Rizwana Baker MD     PREOPERATIVE DIAGNOSIS:  GERD, epigastric abdominal pain, right upper quadrant pain, iron deficiency anemia     POSTOPERATIVE DIAGNOSIS:  See impression.     SURGEON:  Bowen Sanchez M.D.     SEDATION:    MAC anesthesia provided by the Anesthesia Service.  MAC anesthesia requested due to BMI 42.5, anticipated intolerance of the EGD and colonoscopy examination under safe doses conscious sedation medications     Estimated blood loss: none/insignificant        EGD PROCEDURE:    After the nature and risks of EGD and colonoscopy examinations under MAC anesthesia were discussed with the patient and all questions answered, informed consent was obtained.  The patient was sedated as above.       Once sedated, the Olympus adult diagnostic  gastroscope was placed in the patient's mouth and advanced under direct visualization through the oropharynx into the esophagus, on down through the stomach and pylorus to the duodenal bulb and second third portions of the duodenum.  Retroflexion was performed in the stomach.     EGD FINDINGS:    Esophagus and GE junction: Normal GE junction, Z-line.     Large hiatal hernia again appreciated below the Z-line with at least 7-8 cm of the stomach herniated up above the diaphragmatic pinch.     Stomach: Clear secretions present.  Large hiatal hernia as above.  Careful exam of the pinch point, mucosa above and below this area showed no mucosal erosions today.  No heme present in the stomach.     Random gastric mucosal biopsies obtained to evaluate the iron deficiency anemia.     Duodenum: Clear secretions present.  Normal duodenal bulb and second, third portions duodenum.  Random duodenal mucosal biopsies obtained.     COLONOSCOPY PROCEDURE:    The patient was repositioned for colonoscopy examination.  Additional sedation given.  Digital rectal exam was performed, which showed no masses.  The Olympus pediatric video colonoscope was advanced under direct visualization through the entire length of the colon to the cecum and terminal ileum.  Retroflex exam performed up the ascending colon to the hepatic flexure.  Cecum was confirmed by landmarks, including appendiceal orifice, cecal trifold, ileocecal valve.  Retroflexion was performed in the rectum.     The quality of the prep was excellent.     COLONOSCOPY FINDINGS:  Single sessile 6 mm polyp removed from the mid rectal vault by cold snare polypectomy, suctioned out.  Moderate severity diverticulosis descending and sigmoid colon.  Small internal hemorrhoids.     IMPRESSION:  Large hiatal hernia as previously described.  No associated mucosal erosions or inflammatory changes seen on today's exam.  No culprit for iron deficiency anemia seen on today's EGD or colonoscopy  examinations.  Random gastric and duodenal mucosal biopsies obtained.  Moderate diverticulosis of the descending and sigmoid colon.  Small internal hemorrhoids.     RECOMMENDATIONS:  Followup above gastric and duodenal mucosal biopsy results.  Follow-up above colon polyp pathology results.  High-fiber diet.  Start trial of iron replacement therapy, follow CBC and iron studies.  Patient's previous history for extensive/heavy naproxen use after two foot surgeries, and her personal concern for possible recent adverse reaction to Reclast medication noted.  Repeat colonoscopy examination in 5 years.  No aspirin or NSAID medications for the next 5 days to prevent bleeding      PATH:  A. Duodenum; biopsy:   Duodenal mucosa without significant histopathology.  Intact villous architecture identified.     B. Stomach; biopsy:  Oxyntic mucosa without significant histopathology.  Diff-Quik stain negative for Helicobacter pylori organisms (appropriate control).     C. Rectal polyp (x1):   Sessile serrated adenoma.        HPI    Review of Systems         Current Outpatient Medications   Medication Sig Dispense Refill    rosuvastatin 5 MG Oral Tab Take 1 tablet (5 mg total) by mouth nightly. 90 tablet 1    metoprolol succinate ER 50 MG Oral Tablet 24 Hr Take 1 tablet (50 mg total) by mouth daily. 90 tablet 1    SUMAtriptan (IMITREX) 50 MG Oral Tab Use at onset; repeat once after 2 HRS-ONLY 2 IN 24 HR MAX 9 tablet 1    olmesartan 20 MG Oral Tab Take 1 tablet (20 mg total) by mouth in the morning and 1 tablet (20 mg total) before bedtime. Take second dose only if bp is higher than 140/90. 180 tablet 1    pimecrolimus (ELIDEL) 1 % External Cream Apply 1 Application topically 2 (two) times daily. Use bid to eczema 30 g 2    Tirzepatide-Weight Management (ZEPBOUND) 15 MG/0.5ML Subcutaneous Solution Auto-injector Inject 15 mg into the skin once a week. 6 mL 1    ergocalciferol 1.25 MG (12532 UT) Oral Cap Take 1 capsule (50,000 Units  total) by mouth. Taking once every 3 weeks      pantoprazole 40 MG Oral Tab EC TAKE 1 TABLET BY MOUTH EVERY MORNING BEFORE BREAKFAST (Patient taking differently: TAKE 1 TABLET BY MOUTH EVERY MORNING BEFORE BREAKFASTAS NEEDED) 90 tablet 3    PROLIA 60 MG/ML Subcutaneous Solution Prefilled Syringe INJECT 1 ML (60 MG TOTAL) UNDER THE SKIN ONE TIME FOR 1 DOSE 1 mL 0    Multiple Vitamins-Minerals (MULTI-VITAMIN/MINERALS) Oral Tab Take 1 tablet by mouth daily.      Misc Natural Products (GLUCOSAMINE CHOND MSM FORMULA OR) Take by mouth.      Tirzepatide-Weight Management (ZEPBOUND) 12.5 MG/0.5ML Subcutaneous Solution Auto-injector Inject 12.5 mg into the skin once a week. (Patient not taking: Reported on 3/11/2025) 2 mL 2     Allergies:No Known Allergies   PHYSICAL EXAM:   Physical Exam      Meds This Visit:  Requested Prescriptions      No prescriptions requested or ordered in this encounter       Imaging & Referrals:  None       ID#[id number]

## 2025-03-14 ENCOUNTER — HOSPITAL ENCOUNTER (OUTPATIENT)
Dept: CT IMAGING | Facility: HOSPITAL | Age: 60
Discharge: HOME | End: 2025-03-14
Attending: INTERNAL MEDICINE

## 2025-03-14 DIAGNOSIS — Z13.6 SCREENING FOR CARDIOVASCULAR CONDITION: ICD-10-CM

## 2025-04-07 ENCOUNTER — OFFICE VISIT (OUTPATIENT)
Dept: DERMATOLOGY CLINIC | Facility: CLINIC | Age: 60
End: 2025-04-07
Payer: COMMERCIAL

## 2025-04-07 DIAGNOSIS — L40.9 PSORIASIS AND SIMILAR DISORDER: ICD-10-CM

## 2025-04-07 DIAGNOSIS — L29.9 PRURITUS: ICD-10-CM

## 2025-04-07 DIAGNOSIS — D23.9 BENIGN NEOPLASM OF SKIN, UNSPECIFIED LOCATION: ICD-10-CM

## 2025-04-07 DIAGNOSIS — L82.1 SK (SEBORRHEIC KERATOSIS): ICD-10-CM

## 2025-04-07 DIAGNOSIS — L57.0 AK (ACTINIC KERATOSIS): Primary | ICD-10-CM

## 2025-04-07 DIAGNOSIS — D22.9 MULTIPLE NEVI: ICD-10-CM

## 2025-04-07 DIAGNOSIS — H01.9 DERMATITIS OF EYELID, UNSPECIFIED LATERALITY, UNSPECIFIED TYPE: ICD-10-CM

## 2025-04-07 DIAGNOSIS — L81.4 LENTIGO: ICD-10-CM

## 2025-04-07 PROCEDURE — 99214 OFFICE O/P EST MOD 30 MIN: CPT | Performed by: DERMATOLOGY

## 2025-04-07 RX ORDER — AMITRIPTYLINE HYDROCHLORIDE 10 MG/1
10 TABLET ORAL NIGHTLY
Qty: 90 TABLET | Refills: 1 | Status: SHIPPED | OUTPATIENT
Start: 2025-04-07

## 2025-04-07 RX ORDER — CLOBETASOL PROPIONATE 0.5 MG/G
1 CREAM TOPICAL 2 TIMES DAILY
Qty: 90 G | Refills: 3 | Status: SHIPPED | OUTPATIENT
Start: 2025-04-07 | End: 2026-04-07

## 2025-04-07 NOTE — PROGRESS NOTES
The following individual(s) verbally consented to be recorded using ambient AI listening technology and understand that they can each withdraw their consent to this listening technology at any point by asking the clinician to turn off or pause the recording:    Patient name: Kianna FORMAN Beau  Additional names:  N/A

## 2025-04-13 NOTE — PROGRESS NOTES
Kianna Yanez is a 59 year old female.  HPI:     CC:    Chief Complaint   Patient presents with    Actinic Keratosis     LOV 24- Pt presents for follow up to AK on the Right jawline (biopsy on site last visit). Area healed well but some redness present.    Derm Problem     Pt presents with a skin patch of concern on the Right posterior shoulder blade- red, irritated, dry, and itchy x 1 year.  Patient had tried numerous creams, including hydrocortisone without improvement.   Personal Hx of AK. Family Hx of BCC (Father).          Allergies:  Patient has no known allergies.    HISTORY:    Past Medical History:    Achilles bursitis or tendinitis    Acne    Actinic keratosis    AK (actinic keratosis)    right jawline    Allergic rhinitis    Anemia    Anxiety    Arthritis    osteoarthritis in knees    Back pain    Calcaneal spur    Calculus of kidney    Colitis        Diverticulosis of large intestine    diverticulitis 2012 per pt    Esophageal reflux    Hallux rigidus    Herpes zoster    Hiatal hernia    High cholesterol    History of stomach ulcers    in [facility]    Lumbar radiculopathy    Migraine without aura and without status migrainosus, not intractable    Noninfectious gastroenteritis and colitis    Nonrheumatic mitral valve regurgitation    mild    Nonspecific elevation of level of transaminase or lactic acid dehydrogenase (LDH)    Obesity, unspecified    Osteoarthritis    Osteoporosis    Other and unspecified hyperlipidemia    Palpitations    Unspecified essential hypertension    Visual impairment    glasses      Past Surgical History:   Procedure Laterality Date    Cholecystectomy      Colonoscopy N/A 2021    Procedure: COLONOSCOPY/ESOPHAGOGASTRODUODENOSCOPY (EGD);  Surgeon: Bowen Sanchez MD;  Location: [facility] ENDOSCOPY    Egd      Fusion foot bone,midtarsal,1 jt Left 2020    Leep  2004      1991    And 1992    Other Bilateral 2020    Joint Fusion 1st Metataral  bilateral    Other surgical history  Big toe joint fusion, 2021    Skin surgery  02/2019    Biopsy-benign    Tonsillectomy  1972      Family History   Problem Relation Age of Onset    Heart Attack Father 60        myocardial infarction    Diabetes Father     Hypertension Father     Prostate Cancer Father 73    Anemia Father     Heart Disorder Father     Lipids Father     Obesity Father     Pulmonary Disease Father         COPD    Other (Bladder Cancer) Father     Other (pacemaker) Mother         brachycardia    Hypertension Mother     Bleeding Disorders Mother         Blood clots    Lipids Mother     Obesity Mother     Anemia Mother     Cancer Maternal Grandmother         Uterine Cancer    Diabetes Maternal Grandfather     Obesity Maternal Grandfather     Bleeding Disorders Paternal Grandmother         Blood clots    Heart Disorder Paternal Grandfather     No Known Problems Sister     No Known Problems Brother     Lipids Other         family h/o hyperlipidemia and vascular disease    Breast Cancer Neg     Ovarian Cancer Neg       Social History     Socioeconomic History    Marital status:    Tobacco Use    Smoking status: Never     Passive exposure: Never    Smokeless tobacco: Never   Vaping Use    Vaping status: Never Used   Substance and Sexual Activity    Alcohol use: Yes     Alcohol/week: 2.0 standard drinks of alcohol     Types: 2 Glasses of wine per week     Comment: approx 2 drinks per month    Drug use: No    Sexual activity: Not Currently   Other Topics Concern    Caffeine Concern No    Stress Concern Yes    Weight Concern Yes    Special Diet No    Exercise Yes    Seat Belt Yes    Breast feeding No    Reaction to local anesthetic No    Pt has a pacemaker No    Pt has a defibrillator No     Social Drivers of Health     Food Insecurity: No Food Insecurity (2/14/2025)    NCSS - Food Insecurity     Worried About Running Out of Food in the Last Year: No     Ran Out of Food in the Last Year: No    Transportation Needs: No Transportation Needs (2/14/2025)    NCSS - Transportation     Lack of Transportation: No   Housing Stability: Not At Risk (2/14/2025)    NCSS - Housing/Utilities     Has Housing: Yes     Worried About Losing Housing: No     Unable to Get Utilities: No        Current Outpatient Medications   Medication Sig Dispense Refill    clobetasol 0.05 % External Cream Apply 1 Application topically 2 (two) times daily. To itching areas on back 90 g 3    amitriptyline 10 MG Oral Tab Take 1 tablet (10 mg total) by mouth nightly. 90 tablet 1    rosuvastatin 5 MG Oral Tab Take 1 tablet (5 mg total) by mouth nightly. 90 tablet 1    metoprolol succinate ER 50 MG Oral Tablet 24 Hr Take 1 tablet (50 mg total) by mouth daily. 90 tablet 1    SUMAtriptan (IMITREX) 50 MG Oral Tab Use at onset; repeat once after 2 HRS-ONLY 2 IN 24 HR MAX 9 tablet 1    olmesartan 20 MG Oral Tab Take 1 tablet (20 mg total) by mouth in the morning and 1 tablet (20 mg total) before bedtime. Take second dose only if bp is higher than 140/90. 180 tablet 1    pimecrolimus (ELIDEL) 1 % External Cream Apply 1 Application topically 2 (two) times daily. Use bid to eczema 30 g 2    Tirzepatide-Weight Management (ZEPBOUND) 15 MG/0.5ML Subcutaneous Solution Auto-injector Inject 15 mg into the skin once a week. 6 mL 1    Tirzepatide-Weight Management (ZEPBOUND) 12.5 MG/0.5ML Subcutaneous Solution Auto-injector Inject 12.5 mg into the skin once a week. 2 mL 2    ergocalciferol 1.25 MG (23270 UT) Oral Cap Take 1 capsule (50,000 Units total) by mouth. Taking once every 3 weeks      PROLIA 60 MG/ML Subcutaneous Solution Prefilled Syringe INJECT 1 ML (60 MG TOTAL) UNDER THE SKIN ONE TIME FOR 1 DOSE 1 mL 0    Multiple Vitamins-Minerals (MULTI-VITAMIN/MINERALS) Oral Tab Take 1 tablet by mouth daily.      Misc Natural Products (GLUCOSAMINE CHOND MSM FORMULA OR) Take by mouth.      pantoprazole 40 MG Oral Tab EC TAKE 1 TABLET BY MOUTH EVERY MORNING BEFORE  BREAKFAST (Patient not taking: Reported on 2025) 90 tablet 3     Allergies:   No Known Allergies    Past Medical History:    Achilles bursitis or tendinitis    Acne    Actinic keratosis    AK (actinic keratosis)    right jawline    Allergic rhinitis    Anemia    Anxiety    Arthritis    osteoarthritis in knees    Back pain    Calcaneal spur    Calculus of kidney    Colitis    2012    Diverticulosis of large intestine    diverticulitis  per pt    Esophageal reflux    Hallux rigidus    Herpes zoster    Hiatal hernia    High cholesterol    History of stomach ulcers    in [facility]    Lumbar radiculopathy    Migraine without aura and without status migrainosus, not intractable    Noninfectious gastroenteritis and colitis    Nonrheumatic mitral valve regurgitation    mild    Nonspecific elevation of level of transaminase or lactic acid dehydrogenase (LDH)    Obesity, unspecified    Osteoarthritis    Osteoporosis    Other and unspecified hyperlipidemia    Palpitations    Unspecified essential hypertension    Visual impairment    glasses     Past Surgical History:   Procedure Laterality Date    Cholecystectomy      Colonoscopy N/A 2021    Procedure: COLONOSCOPY/ESOPHAGOGASTRODUODENOSCOPY (EGD);  Surgeon: Bowen Sanchez MD;  Location: [facility] ENDOSCOPY    Egd      Fusion foot bone,midtarsal,1 jt Left 2020    Leep  2004      1991    And 1992    Other Bilateral 2020    Joint Fusion 1st Metataral bilateral    Other surgical history  Big toe joint fusion,     Skin surgery  2019    Biopsy-benign    Tonsillectomy  1972     Social History     Socioeconomic History    Marital status:      Spouse name: Not on file    Number of children: Not on file    Years of education: Not on file    Highest education level: Not on file   Occupational History    Not on file   Tobacco Use    Smoking status: Never     Passive exposure: Never    Smokeless tobacco: Never   Vaping Use    Vaping status:  Never Used   Substance and Sexual Activity    Alcohol use: Yes     Alcohol/week: 2.0 standard drinks of alcohol     Types: 2 Glasses of wine per week     Comment: approx 2 drinks per month    Drug use: No    Sexual activity: Not Currently   Other Topics Concern     Service Not Asked    Blood Transfusions Not Asked    Caffeine Concern No    Occupational Exposure Not Asked    Hobby Hazards Not Asked    Sleep Concern Not Asked    Stress Concern Yes    Weight Concern Yes    Special Diet No    Back Care Not Asked    Exercise Yes    Bike Helmet Not Asked    Seat Belt Yes    Self-Exams Not Asked    Grew up on a farm Not Asked    History of tanning Not Asked    Outdoor occupation Not Asked    Breast feeding No    Reaction to local anesthetic No    Pt has a pacemaker No    Pt has a defibrillator No   Social History Narrative    Not on file     Social Drivers of Health     Food Insecurity: No Food Insecurity (2/14/2025)    NCSS - Food Insecurity     Worried About Running Out of Food in the Last Year: No     Ran Out of Food in the Last Year: No   Transportation Needs: No Transportation Needs (2/14/2025)    NCSS - Transportation     Lack of Transportation: No   Stress: Not on file   Housing Stability: Not At Risk (2/14/2025)    NCSS - Housing/Utilities     Has Housing: Yes     Worried About Losing Housing: No     Unable to Get Utilities: No     Family History   Problem Relation Age of Onset    Heart Attack Father 60        myocardial infarction    Diabetes Father     Hypertension Father     Prostate Cancer Father 73    Anemia Father     Heart Disorder Father     Lipids Father     Obesity Father     Pulmonary Disease Father         COPD    Other (Bladder Cancer) Father     Other (pacemaker) Mother         brachycardia    Hypertension Mother     Bleeding Disorders Mother         Blood clots    Lipids Mother     Obesity Mother     Anemia Mother     Cancer Maternal Grandmother         Uterine Cancer    Diabetes Maternal  Grandfather     Obesity Maternal Grandfather     Bleeding Disorders Paternal Grandmother         Blood clots    Heart Disorder Paternal Grandfather     No Known Problems Sister     No Known Problems Brother     Lipids Other         family h/o hyperlipidemia and vascular disease    Breast Cancer Neg     Ovarian Cancer Neg        There were no vitals filed for this visit.      HPI:    Chief Complaint   Patient presents with    Actinic Keratosis     LOV 12/23/24- Pt presents for follow up to AK on the Right jawline (biopsy on site last visit). Area healed well but some redness present.    Derm Problem     Pt presents with a skin patch of concern on the Right posterior shoulder blade- red, irritated, dry, and itchy x 1 year.  Patient had tried numerous creams, including hydrocortisone without improvement.   Personal Hx of AK. Family Hx of BCC (Father).      Past notes/ records and appropriate/relevant lab results including pathology and past body maps reviewed. Updated and new information noted in current visit.     Patient with history of keloids, lichenoid keratosis biopsy 2019 with hypertrophic scar slowly improving    Patient with family history of skin cancer  No personal history of skin cancer  Patient with history of lichenoid AK  Patient with history of DSA P      History of Present Illness  She presents with persistent localized itching.    She experiences persistent itching in a localized area, describing it as constant but not severe enough to cause her to scratch the area raw. She notes a small bump in the area and has tried various topical treatments including hydrocortisone cream, anti-itch creams like Pramoxine and Benadryl, and pain-relieving creams such as Biofreeze, but none have provided significant relief.    She has experienced significant weight loss, having lost 103 pounds recently, which is the third significant weight loss in her life. Due to this weight loss, she is considering non-invasive  skin tightening procedures.    Her mother has neuropathy and has been on gabapentin for years, indicating a family history of nerve-related issues.    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      Patient presents with concerns above.    Patient has been in their usual state of health.  History, medications, allergies reviewed as noted.      ROS:  Denies any other systemic complaints.  No new or changeing lesions other than noted above. No fevers, chills, night sweats, unusual sun sensitivity.  No other skin complaints.        History, medications, allergies reviewed as noted.       Physical Examination:     Well-developed well-nourished patient alert oriented in no acute distress.  Exam total-body performed, including scalp, head, neck, face,nails, hair, external eyes, including conjunctival mucosa, eyelids, lips external ears, back, chest,/ breasts, axillae,  abdomen, arms, legs, palms.     Multiple light to medium brown, well marginated, uniformly pigmented, macules and papules 6 mm and less scattered on exam. pigmented lesions examined with dermoscopy benign-appearing patterns.     Waxy tannish keratotic papules scattered, cherry-red vascular papules scattered.    See map today's date for lesions noted .      Otherwise remarkable for lesions as noted on map.  See details of examination  See Assessment /Plan for additional history and physical exam also:    Assessment / plan:    No orders of the defined types were placed in this encounter.      Meds & Refills for this Visit:  Requested Prescriptions     Signed Prescriptions Disp Refills    clobetasol 0.05 % External Cream 90 g 3     Sig: Apply 1 Application topically 2 (two) times daily. To itching areas on back    amitriptyline 10 MG Oral Tab 90 tablet 1     Sig: Take 1 tablet (10 mg total) by mouth nightly.          Encounter Diagnoses   Name Primary?    AK (actinic keratosis) Yes    SK (seborrheic keratosis)     Lentigo     Multiple nevi     Benign neoplasm of skin, unspecified location     Psoriasis and similar disorder     Dermatitis of eyelid, unspecified laterality, unspecified type     Pruritus        See details on map.      Remarkable for:              Physical Exam  SKIN: Biopsy site on thigh less pronounced, healing right jawline mild erythema texture change around the base    Results  PATHOLOGY  Biopsy: AK right jawline for follow-up    Assessment & Plan  Localized itching  Persistent localized itching, likely neuropathic rather than dermatological. She has used topical treatments including hydrocortisone, anti-itch creams, and Biofreeze without relief. The itching is constant but not severe enough to cause excoriation. Oral amitriptyline is preferred over gabapentin due to fewer side effects and efficacy in treating localized itching.  - Start amitriptyline 10 mg at bedtime    Post-biopsy healing  Post-thigh biopsy healing is typical, though she is concerned about appearance. Normal healing process observed, with advice to reduce inflammation.  - Apply moisturizer and hydrocortisone to reduce inflammation  - Avoid Neosporin unless there is no reaction  AK base of area retreated with cryo    Skin laxity post-weight loss  Significant weight loss has led to concerns about skin laxity. Non-invasive skin tightening procedures at [facility], such as radiofrequency and cryotherapy, have been effective for other patients. These procedures can tighten skin without surgery.  - Refer to [facility] for consultation on non-invasive skin tightening procedures          New issue patient with history of eyelid dermatitis.  Has noted flaring of eyelid dermatitis longstanding history of eczema.  Had old tube of Elidel which had been very helpful.  Due to location on the face nonsteroid medications necessary.  More  psoriasiform lesions noted in the past.  Medically necessary to continue pimecrolimus.  Chronic facial psoriasiform dermatitis.  Recent flare has been going on.  Patient with recent laryngitis.  Possibly triggered.  Most prominent left upper eyelid in the crease now spreading to right.      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  Given history of hypertrophic scarring caution with any additional biopsies on the chest in particular    History of outside biopsy 11/24 [facility] lentiginous junctional nevus right proximal thigh.  No evidence of recurrence.      Waxy tan keratotic papules lesions in areas of concern as noted reassurance given.  Benign nature discussed.  Possibility of cryo, alphahydroxy acids over-the-counter retinol's discussed.  Reassurance    Moderate sun damage lentigines early AK's along the left jawline observe carefully retinol.  Continue careful sun protection    Disseminated superficial actinic porokeratosis over lower extremities, forearms upper arms some areas on chest reassurance.  Observation.  Unchanged numerous lesions.  Increasing number over the arms  Alphahydroxy acids including lactic acid, glycolic acid, urea, salicylic acid may be helpful.  Suggest over-the-counter products such as Goldbond psoriasis cream 3% salicylic acid, rough and bumpy, Cerave SA cream, AmLactin, and others as available with these ingredients.    Dermatofibroma right posterior upper arm observed.  Stable    Right foot history of hypertrophic scar monitor  Right forearm waxy tan keratotic papule lesion at mid back, left forearm reassurance regarding benign keratoses.  Fair skin no other suspicious lesions currently  History of AK's, family history skin cancer continue routine skin checks, sun protection    Please refer to map for specific lesions.  See additional diagnoses.  Pros cons of various therapies, risks benefits  discussed.Pathophysiology discussed with patient.  Therapeutic options reviewed.  See  Medications in grid.  Instructions reviewed at length.    Benign nevi, seborrheic  keratoses, cherry angiomas:  Reassurance regarding other benign skin lesions.    Monitor for new or changing lesions. Signs and symptoms of skin cancer, ABCDE's of melanoma ( additional information available at AAD.org, skincancer.org) Encourage Sunscreen (broad-spectrum, ideally mineral-based-UVA/UVB -SPF 30 or higher) use encouraged, sun protection/sun protective clothing, self exams reviewed Followup as noted RTC ---routine checkup 6 mos -one year or p.r.n.    Encounter Times   Including precharting, reviewing chart, prior notes obtaining history: 10 minutes, medical exam :10 minutes, notes on body map, plan, counseling 10minutes My total time spent caring for the patient on the day of the encounter: 30 minutes     The patient indicates understanding of these issues and agrees to the plan.  The patient is asked to return as noted in follow-up/ above.    This note was generated using Dragon voice recognition software.  Please contact me regarding any confusion resulting from errors in recognition..  Note to patient and family: The 21st Century Cures Act makes medical notes like these available to patients. However, be advised this is a medical document. It is intended as [id number] communication and monitoring of a patient's care needs. It is written in medical language and may contain abbreviations or verbiage that are unfamiliar. It may appear blunt or direct. Medical documents are intended to carry relevant information, facts as evident and the clinical opinion of the practitioner.

## 2025-04-23 ENCOUNTER — PATIENT MESSAGE (OUTPATIENT)
Dept: ENDOCRINOLOGY CLINIC | Facility: CLINIC | Age: 60
End: 2025-04-23

## 2025-04-23 ENCOUNTER — TELEPHONE (OUTPATIENT)
Dept: ENDOCRINOLOGY CLINIC | Facility: CLINIC | Age: 60
End: 2025-04-23

## 2025-04-23 NOTE — TELEPHONE ENCOUNTER
Pt's last Prolia injection was on 11/21/2024. Pt will be due for next injection on or after 05/22/2025.     Pt has an upcoming appt on 05/22/2025 for Prolia Injection with .        Submitted Prolia IV via [employer] portal  Awaiting SOB, 3-5 business days

## 2025-04-23 NOTE — TELEPHONE ENCOUNTER
----- Message from Seelne HANNA sent at 11/21/2024  6:30 PM CST -----  Regarding: Prolia due 5/2025  Please start Prolia IV

## 2025-04-24 NOTE — TELEPHONE ENCOUNTER
Unfortunately we can't do VV if [place].  Are there any [facility] spots later in May to move her appt? If not then just do a RN spot later in May and we can reschedule her MD visit. Thanks.

## 2025-05-02 NOTE — TELEPHONE ENCOUNTER
Pt has an upcoming appt on 05/22/2025 for Prolia Injection with Dr. King    Received pt's Prolia SOB via [employer]     PA Required: No  Prolia OOP COST: $70  Facility Fee: NA  Admin: 0%

## 2025-05-13 ENCOUNTER — TELEPHONE (OUTPATIENT)
Facility: CLINIC | Age: 60
End: 2025-05-13

## 2025-05-25 DIAGNOSIS — I10 ESSENTIAL HYPERTENSION: ICD-10-CM

## 2025-05-25 DIAGNOSIS — E78.5 DYSLIPIDEMIA, GOAL LDL BELOW 100: ICD-10-CM

## 2025-05-27 ENCOUNTER — NURSE ONLY (OUTPATIENT)
Dept: ENDOCRINOLOGY CLINIC | Facility: CLINIC | Age: 60
End: 2025-05-27
Payer: COMMERCIAL

## 2025-05-27 DIAGNOSIS — M81.0 AGE-RELATED OSTEOPOROSIS WITHOUT CURRENT PATHOLOGICAL FRACTURE: Primary | ICD-10-CM

## 2025-05-27 PROCEDURE — 96372 THER/PROPH/DIAG INJ SC/IM: CPT | Performed by: INTERNAL MEDICINE

## 2025-05-27 NOTE — PROGRESS NOTES
Patient arrived to clinic for prolia injection - patient given and tolerated 60mg prolia injection to right upper arm  Patient stated understanding to return to clinic in 6 months for follow-up with Dr. King and next prolia injection   Patient stated understanding to complete labs one week prior - prolia labs ordered per protocol

## 2025-05-28 RX ORDER — ROSUVASTATIN CALCIUM 5 MG/1
5 TABLET, COATED ORAL NIGHTLY
Qty: 90 TABLET | Refills: 0 | Status: SHIPPED | OUTPATIENT
Start: 2025-05-28

## 2025-05-28 RX ORDER — METOPROLOL SUCCINATE 50 MG/1
50 TABLET, EXTENDED RELEASE ORAL DAILY
Qty: 90 TABLET | Refills: 0 | Status: SHIPPED | OUTPATIENT
Start: 2025-05-28

## 2025-06-30 ENCOUNTER — OFFICE VISIT (OUTPATIENT)
Dept: ENDOCRINOLOGY CLINIC | Facility: CLINIC | Age: 60
End: 2025-06-30
Payer: COMMERCIAL

## 2025-06-30 VITALS — BODY MASS INDEX: 26 KG/M2 | DIASTOLIC BLOOD PRESSURE: 80 MMHG | WEIGHT: 146 LBS | SYSTOLIC BLOOD PRESSURE: 112 MMHG

## 2025-06-30 DIAGNOSIS — M81.0 AGE-RELATED OSTEOPOROSIS WITHOUT CURRENT PATHOLOGICAL FRACTURE: Primary | ICD-10-CM

## 2025-06-30 DIAGNOSIS — N25.81 SECONDARY HYPERPARATHYROIDISM (HCC): ICD-10-CM

## 2025-06-30 DIAGNOSIS — E66.9 OBESITY (BMI 30-39.9): ICD-10-CM

## 2025-06-30 PROCEDURE — 99214 OFFICE O/P EST MOD 30 MIN: CPT | Performed by: INTERNAL MEDICINE

## 2025-06-30 PROCEDURE — 3079F DIAST BP 80-89 MM HG: CPT | Performed by: INTERNAL MEDICINE

## 2025-06-30 PROCEDURE — 3074F SYST BP LT 130 MM HG: CPT | Performed by: INTERNAL MEDICINE

## 2025-06-30 NOTE — PROGRESS NOTES
Name: Kianna Yanez  Date: 6/30/2025      HISTORY OF PRESENT ILLNESS   Kianna Yanez is a 59 year old female who presents for   Chief Complaint   Patient presents with    Follow - Up     Pt in to discuss Zepbound        #1 Osteoporosis   58 y/o F presents for follow up evaluation of osteoporosis.  She underwent screening DEXA which demonstrated significant bone loss in her spine.  She is currently postmenopausal and she has not had regular cycles in the past 5 years.  She does have history of stress fracture in the ankle during exercise. She does have history of nephrolithiasis in 2001.  No known family h/o nephrolithiasis.  She is currently maintained on Vitamin D 1000 units daily and her level in 8/2014 was 44.    She is having greek yogurt daily and occ almond milk.  Approx 1500mg of calcium in the diet.  But previously low calcium intake as a younger adult.     She is received prolia injection #10 given in December 2019.  She was then transitioned off prolia with Reclast infusion in 8/2020.  However she developed severe side effects to Reclast including fever and severe joint pain/myalgias.  She is now feeling improved.  No falls or fractures since last visit.  Of note she does have history of significant Vitamin D deficiency maintained on 1 tablet every 5 days.  Her DEXA does demonstrate a decline in BMD over the past 2 years despite persistent therapy.     She has been restarted on Prolia 4/30/2021 and overall tolerated well.  No falls or fractures.  She did have severe reaction to reclast with persistent myalgias for approximately 11 months.  Labs are improved since restarting prolia therapy.  She has now received 9 injections. She is  tolerating well.          Strong family h/o osteoporosis, Aunt compression fractures    #2 Obesity    She has been started on Zepbound therapy in 2/2024 and tolerating well.  She did have some mild diarrhea but overall tolerating well.  She is now maintained on  Zepbound 15mg subcutaneous weekly.  She has been able to lose approximately 115lbs.  She is tolerating well.      She has been on the 15mg dose since 3/10.      REVIEW OF SYSTEMS  The ROS was updated during office visit 6/30/2025 and updates noted below and in the HPI.     Eyes: no change in vision  Neurologic: no headache, generalized or focal weakness or numbness.  Head: normal  ENT: normal  Lungs: no shortness of breath, wheezing or BADILLO  Cardiovascular:  no chest pain or palpitations  Gastrointestinal:  no abdominal pain, bowel movement problems  Musculoskeletal: no muscle pain or arthralgia  /Gyne: no frequency or discomfort while urinating  Psychiatric:  no acute distress, anxiety  or depression  Skin: normal moisturized skin    Medications:     Current Outpatient Medications:     rosuvastatin 5 MG Oral Tab, Take 1 tablet (5 mg total) by mouth nightly., Disp: 90 tablet, Rfl: 0    metoprolol succinate ER 50 MG Oral Tablet 24 Hr, Take 1 tablet (50 mg total) by mouth daily., Disp: 90 tablet, Rfl: 0    clobetasol 0.05 % External Cream, Apply 1 Application topically 2 (two) times daily. To itching areas on back, Disp: 90 g, Rfl: 3    amitriptyline 10 MG Oral Tab, Take 1 tablet (10 mg total) by mouth nightly., Disp: 90 tablet, Rfl: 1    SUMAtriptan (IMITREX) 50 MG Oral Tab, Use at onset; repeat once after 2 HRS-ONLY 2 IN 24 HR MAX, Disp: 9 tablet, Rfl: 1    olmesartan 20 MG Oral Tab, Take 1 tablet (20 mg total) by mouth in the morning and 1 tablet (20 mg total) before bedtime. Take second dose only if bp is higher than 140/90., Disp: 180 tablet, Rfl: 1    pimecrolimus (ELIDEL) 1 % External Cream, Apply 1 Application topically 2 (two) times daily. Use bid to eczema, Disp: 30 g, Rfl: 2    Tirzepatide-Weight Management (ZEPBOUND) 15 MG/0.5ML Subcutaneous Solution Auto-injector, Inject 15 mg into the skin once a week., Disp: 6 mL, Rfl: 1    ergocalciferol 1.25 MG (84368 UT) Oral Cap, Take 1 capsule (50,000 Units total)  by mouth. Taking once every 3 weeks, Disp: , Rfl:     Multiple Vitamins-Minerals (MULTI-VITAMIN/MINERALS) Oral Tab, Take 1 tablet by mouth daily., Disp: , Rfl:     Misc Natural Products (GLUCOSAMINE CHOND MSM FORMULA OR), Take by mouth., Disp: , Rfl:     Tirzepatide-Weight Management (ZEPBOUND) 12.5 MG/0.5ML Subcutaneous Solution Auto-injector, Inject 12.5 mg into the skin once a week., Disp: 2 mL, Rfl: 2    pantoprazole 40 MG Oral Tab EC, TAKE 1 TABLET BY MOUTH EVERY MORNING BEFORE BREAKFAST (Patient not taking: Reported on 6/30/2025), Disp: 90 tablet, Rfl: 3     Allergies:   No Known Allergies    Social History:   Social History     Socioeconomic History    Marital status:    Tobacco Use    Smoking status: Never     Passive exposure: Never    Smokeless tobacco: Never   Vaping Use    Vaping status: Never Used   Substance and Sexual Activity    Alcohol use: Yes     Alcohol/week: 2.0 standard drinks of alcohol     Types: 2 Glasses of wine per week     Comment: approx 2 drinks per month    Drug use: No    Sexual activity: Not Currently   Other Topics Concern    Caffeine Concern No    Stress Concern Yes    Weight Concern Yes    Special Diet No    Exercise Yes    Seat Belt Yes    Breast feeding No    Reaction to local anesthetic No    Pt has a pacemaker No    Pt has a defibrillator No       Medical History:   Past Medical History:    Achilles bursitis or tendinitis    Acne    Actinic keratosis    AK (actinic keratosis)    right jawline    Allergic rhinitis    Anemia    Anxiety    Arthritis    osteoarthritis in knees    Back pain    Calcaneal spur    Calculus of kidney    Colitis    2012    Diverticulosis of large intestine    diverticulitis 2012 per pt    Esophageal reflux    Hallux rigidus    Herpes zoster    Hiatal hernia    High cholesterol    History of stomach ulcers    in college    Lumbar radiculopathy    Migraine without aura and without status migrainosus, not intractable    Noninfectious  gastroenteritis and colitis    Nonrheumatic mitral valve regurgitation    mild    Nonspecific elevation of level of transaminase or lactic acid dehydrogenase (LDH)    Obesity, unspecified    Osteoarthritis    Osteoporosis    Other and unspecified hyperlipidemia    Palpitations    Unspecified essential hypertension    Visual impairment    glasses       Surgical history:   Past Surgical History:   Procedure Laterality Date    Cholecystectomy      Colonoscopy N/A 2021    Procedure: COLONOSCOPY/ESOPHAGOGASTRODUODENOSCOPY (EGD);  Surgeon: Bowen Sanchez MD;  Location: [facility] ENDOSCOPY    Egd      Fusion foot bone,midtarsal,1 jt Left 2020    Leep  2004          And 1992    Other Bilateral 2020    Joint Fusion 1st Metataral bilateral    Other surgical history  Big toe joint fusion,     Skin surgery  2019    Biopsy-benign    Tonsillectomy  1972       PHYSICAL EXAM  /80   Wt 146 lb (66.2 kg)   LMP 2014 (Approximate)   BMI 26.07 kg/m²     General Appearance:  alert, well developed, in no acute distress  Eyes:  normal conjunctivae, sclera., normal sclera and normal pupils  Throat/Neck: normal sound to voice.   Back: no kyphosis  Respiratory:  non-labored. no increased work of breathing.    Lymph Nodes:  No abnormal nodes noted  Skin:  normal moisture and skin texture  Hematologic:  no excessive bruising  Psychiatric:  oriented to time, self, and place    ASSESSMENT/PLAN:    1. Osteoporosis  -Diagnosed with significant osteoporosis  -She has finished 5 years of prolia and DEXA significantly improved  -She was transitioned off prolia with a dose of Reclast in 2020  -However she developed significant side effects with medication and DEXA BMD has declined  -labs are at goal   -Given rebound bone loss after prolia, restarted therapy, injection #9  -DEXA 2024 stable   -Continue Vitamin D     2. Secondary Hyperparathyroidism  -Due to Vitamin D deficiency  -Trended her PTH  levels over the past 4 years and direct correlation between PTH and Vitamin D  -Now improved with Vitamin D supplementation   -Normal calcium level    3. Obesity  - Discussed weight loss with patient  - Now maintained on Zepbound  - She is tolerating well and able to lose 15lbs.   - Congratulated patient on weight loss and also intermittent fasting   - Discussed slow down titration of Zepbound and decreasing dose to 10mg weekly   - Discussed option of lengthening time to injection if tolerable     RTC 6 months     6/30/2025  Charity King MD

## 2025-08-01 LAB
ALBUMIN/GLOBULIN RATIO: 2 (CALC) (ref 1–2.5)
ALBUMIN: 4.5 G/DL (ref 3.6–5.1)
ALKALINE PHOSPHATASE: 77 U/L (ref 37–153)
ALT: 18 U/L (ref 6–29)
AST: 21 U/L (ref 10–35)
BILIRUBIN, TOTAL: 0.4 MG/DL (ref 0.2–1.2)
BUN: 9 MG/DL (ref 7–25)
CALCIUM: 8.4 MG/DL (ref 8.6–10.4)
CARBON DIOXIDE: 23 MMOL/L (ref 20–32)
CHLORIDE: 104 MMOL/L (ref 98–110)
CHOL/HDLC RATIO: 2.4 (CALC)
CHOLESTEROL, TOTAL: 168 MG/DL
CREATININE: 0.62 MG/DL (ref 0.5–1.03)
EGFR: 103 ML/MIN/1.73M2
GLOBULIN: 2.3 G/DL (CALC) (ref 1.9–3.7)
GLUCOSE: 76 MG/DL (ref 65–99)
HDL CHOLESTEROL: 71 MG/DL
LDL-CHOLESTEROL: 80 MG/DL (CALC)
NON-HDL CHOLESTEROL: 97 MG/DL (CALC)
POTASSIUM: 4 MMOL/L (ref 3.5–5.3)
PROTEIN, TOTAL: 6.8 G/DL (ref 6.1–8.1)
SODIUM: 137 MMOL/L (ref 135–146)
TRIGLYCERIDES: 88 MG/DL

## 2025-08-11 ENCOUNTER — OFFICE VISIT (OUTPATIENT)
Dept: INTERNAL MEDICINE CLINIC | Facility: CLINIC | Age: 60
End: 2025-08-11

## 2025-08-11 VITALS
HEART RATE: 84 BPM | DIASTOLIC BLOOD PRESSURE: 62 MMHG | WEIGHT: 139.88 LBS | SYSTOLIC BLOOD PRESSURE: 90 MMHG | HEIGHT: 62.75 IN | RESPIRATION RATE: 16 BRPM | BODY MASS INDEX: 25.1 KG/M2 | TEMPERATURE: 98 F

## 2025-08-11 DIAGNOSIS — K21.9 GASTROESOPHAGEAL REFLUX DISEASE WITHOUT ESOPHAGITIS: ICD-10-CM

## 2025-08-11 DIAGNOSIS — M81.0 AGE-RELATED OSTEOPOROSIS WITHOUT CURRENT PATHOLOGICAL FRACTURE: ICD-10-CM

## 2025-08-11 DIAGNOSIS — E78.5 DYSLIPIDEMIA, GOAL LDL BELOW 100: ICD-10-CM

## 2025-08-11 DIAGNOSIS — Z00.00 PHYSICAL EXAM, ANNUAL: ICD-10-CM

## 2025-08-11 DIAGNOSIS — G43.009 MIGRAINE WITHOUT AURA AND WITHOUT STATUS MIGRAINOSUS, NOT INTRACTABLE: ICD-10-CM

## 2025-08-11 DIAGNOSIS — F45.8 NEUROGENIC PRURITUS: ICD-10-CM

## 2025-08-11 DIAGNOSIS — I10 ESSENTIAL HYPERTENSION: Primary | ICD-10-CM

## 2025-08-12 DIAGNOSIS — I10 ESSENTIAL HYPERTENSION: ICD-10-CM

## 2025-08-12 DIAGNOSIS — G43.009 MIGRAINE WITHOUT AURA AND WITHOUT STATUS MIGRAINOSUS, NOT INTRACTABLE: ICD-10-CM

## 2025-08-12 DIAGNOSIS — E78.5 DYSLIPIDEMIA, GOAL LDL BELOW 100: ICD-10-CM

## 2025-08-14 RX ORDER — METOPROLOL SUCCINATE 50 MG/1
50 TABLET, EXTENDED RELEASE ORAL DAILY
Qty: 90 TABLET | Refills: 0 | Status: SHIPPED | OUTPATIENT
Start: 2025-08-14

## 2025-08-14 RX ORDER — ROSUVASTATIN CALCIUM 5 MG/1
5 TABLET, COATED ORAL NIGHTLY
Qty: 90 TABLET | Refills: 0 | Status: SHIPPED | OUTPATIENT
Start: 2025-08-14

## 2025-08-14 RX ORDER — SUMATRIPTAN 50 MG/1
TABLET, FILM COATED ORAL
Qty: 9 TABLET | Refills: 0 | Status: SHIPPED | OUTPATIENT
Start: 2025-08-14

## (undated) DIAGNOSIS — M81.0 AGE-RELATED OSTEOPOROSIS WITHOUT CURRENT PATHOLOGICAL FRACTURE: Primary | ICD-10-CM

## (undated) DIAGNOSIS — I10 ESSENTIAL HYPERTENSION: Primary | ICD-10-CM

## (undated) DEVICE — FORCEP RADIAL JAW 4

## (undated) DEVICE — MEDI-VAC NON-CONDUCTIVE SUCTION TUBING 6MM X 1.8M (6FT.) L: Brand: CARDINAL HEALTH

## (undated) DEVICE — SNARE ENDOSCOPIC 10MM ROUND

## (undated) DEVICE — STERILE LATEX POWDER-FREE SURGICAL GLOVESWITH NITRILE COATING: Brand: PROTEXIS

## (undated) DEVICE — CONMED SCOPE SAVER BITE BLOCK, 20X27 MM: Brand: SCOPE SAVER

## (undated) DEVICE — Device: Brand: CUSTOM PROCEDURE KIT

## (undated) DEVICE — SNARE OPTMZ PLPCTM TRP

## (undated) DEVICE — 35 ML SYRINGE REGULAR TIP: Brand: MONOJECT

## (undated) DEVICE — LINE MNTR ADLT SET O2 INTMD

## (undated) DEVICE — Device: Brand: DEFENDO AIR/WATER/SUCTION AND BIOPSY VALVE

## (undated) NOTE — LETTER
02/26/18        Zenaida Yanez  [address]      Dear Filipe Todd,    [facility] records indicate that you have outstanding lab work and or testing that was ordered for you and has not yet been completed:          CBC W Differential W

## (undated) NOTE — MR AVS SNAPSHOT
[address] [facility] [facility]  [address] [place] [id number] [phone]               Thank you for choosing us for your health care visit with Ever Delacruz MD.  We are glad to serve you and happy to provide you with this summary of your visit.   Ple - ergocalciferol 01631 units Caps  - topiramate 50 MG Tabs      You can get these medications from any pharmacy     Bring a paper prescription for each of these medications    - Phentermine HCl 15 MG Caps            MyChart     Visit MyChart  You can acce

## (undated) NOTE — LETTER
5/13/2025          Kianna Yanez    [address] [place] [id number]         Dear Kianna,    Our records indicate that the tests ordered for you by Bowen Sanchez MD  have not been done.  If you have, in fact, already completed the tests or you do not wish to have the tests done, please contact our office at THE NUMBER LISTED BELOW.  Otherwise, please proceed with the testing.  Enclosed is a duplicate order for your convenience.  LABS     Comp Metabolic Panel (14)  Lipase      Sincerely,    Bowen Sanchez MD  [address]  [facility] [id number] [phone]

## (undated) NOTE — LETTER
3/12/2019              [address] [place] [address]         Dear Dhiraj Harmon,      The report of the Biopsy done on at your last visit shows a Benign lichenoid keratosis.   This is a benign (not cancerous) grow

## (undated) NOTE — LETTER
AUTHORIZATION FOR SURGICAL OPERATION OR OTHER PROCEDURE    1.  I hereby authorize Dr. Moncho Schaefer, and [address] [place] [address] staff assigned to my case to perform the following operation and/or procedure at the [address] [place] [address]:    COLPOSCOPY    2.  My physician Responsible person                          []  Spouse  In case of minor or                    [] Other  _____________   Incompetent name:  __________________________________________________                               (please print)      _____________

## (undated) NOTE — LETTER
2/2/2021              [address] [place] [address]         Dear Joe Silverman records indicate that the tests ordered for you by Chelsie Sahu MD  have not been done.   If you have, in fact, already comple

## (undated) NOTE — MR AVS SNAPSHOT
After Visit Summary   2022    Magalie Pugh   MRN: [id number]           Visit Information     Date & Time  2022 11:15 AM Provider  [address] Visit Dept. Phone  [phone]      Your Vitals Were     LMP   2014 (Approximate)             Allergies as of 2022  Review status set to Review Complete on 2022   No Known Allergies     Your Current Medications        Dosage    denosumab (PROLIA) 60 MG/ML Subcutaneous Solution Prefilled Syringe () Inject 1 mL (60 mg total) into the skin one time for 1 dose. Olmesartan Medoxomil 40 MG Oral Tab Take 0.5 tablets (20 mg total) by mouth daily. hydroCHLOROthiazide 25 MG Oral Tab Take 1 tablet (25 mg total) by mouth daily. rosuvastatin 10 MG Oral Tab Take 1 tablet (10 mg total) by mouth nightly. metoprolol succinate ER 25 MG Oral Tablet 24 Hr Take 1 tablet (25 mg total) by mouth daily. pantoprazole 40 MG Oral Tab EC TAKE 1 TABLET BY MOUTH EVERY MORNING BEFORE BREAKFAST    ERGOCALCIFEROL 1.25 MG (71185 UT) Oral Cap TAKE 2 CAPSULES BY MOUTH ONE TIME WEEKLY    POTASSIUM OR Take 99 mg by mouth daily. Blood Pressure Monitoring (ADVOCATE WRIST BP MONITOR) Does not apply Misc Check blood pressure daily as needed    Multiple Vitamins-Minerals (MULTI-VITAMIN/MINERALS) Oral Tab Take 1 tablet by mouth daily. Misc Natural Products (GLUCOSAMINE CHOND MSM FORMULA OR) Take by mouth.     SUMAtriptan Succinate (IMITREX) 50 MG Oral Tab Use at onset; repeat once after 2 HRS-ONLY 2 IN 24 HR MAX      Diagnoses for This Visit    Administrative encounter   [[id number]]  -  Primary           We Ordered the Following     Normal Orders This Visit    DUMMY E-VISIT Re[address] 60. [EVISIT CPT(R)]       Future Appointments        Provider Department    3/24/2023 7:40 AM [facility]    [facility] 8[facility] [address]                Did you know that [facility] primary care physicians now offer Video Visits through [address] for adult patients for a variety of conditions such as allergies, back pain and cold symptoms? Skip the drive and waiting room and online chat with a doctor face-to-face using your web-cam enabled computer or mobile device wherever you are. Video Visits cost $50 and can be paid hassle-free using a credit, debit, or health savings card. Not active on [employer]? Ask us how to get signed up today! If you receive a survey from [employer], please take a few minutes to complete it and provide feedback. We strive to deliver the best patient experience and are looking for ways to make improvements. Your feedback will help us do so. For more information on [employer], please visit [email or web address]           No text in SmartText           No text in SmartText

## (undated) NOTE — LETTER
8/9/2022              [address] [facility] [address]         Dear Janice Arias,    [facility] records indicate that the tests ordered for you by Cirilo Hsu MD  have not been done. If you have, in fact, already completed the tests or you do not wish to have the tests done, please contact our office at . Otherwise, please proceed with the testing. Enclosed is a duplicate order for your convenience. Labs order:        CBC, PLATELET; NO DIFFERENTIAL   COMP METABOLIC PANEL (14)   LIPASE   Please call "[facility]" to schedule this test order.     Sincerely,    Cirilo Hsu MD  [address]  [address]  [place] [address]  [phone]

## (undated) NOTE — LETTER
December 23, 2019         Christine Mansfield, [address] [facility]      Patient: Veda Mccord   YOB: 1965   Date of Visit: 12/23/2019       Dear Dr. Sadaf Fuchs MD,    I saw your patient, Veda Mccord, on 12/

## (undated) NOTE — ED AVS SNAPSHOT
Jose Alfredo Knutson   MRN: [id number]    Department:  [facility]   Date of Visit:  12/22/2017           Disclosure     Insurance plans vary and the physician(s) referred by the ER may not be covered by your plan.  Please con CARE PHYSICIAN AT ONCE OR RETURN IMMEDIATELY TO THE EMERGENCY DEPARTMENT. If you have been prescribed any medication(s), please fill your prescription right away and begin taking the medication(s) as directed.   If you believe that any of the medications